# Patient Record
Sex: MALE | Race: WHITE | Employment: OTHER | URBAN - METROPOLITAN AREA
[De-identification: names, ages, dates, MRNs, and addresses within clinical notes are randomized per-mention and may not be internally consistent; named-entity substitution may affect disease eponyms.]

---

## 2017-12-05 ENCOUNTER — APPOINTMENT (OUTPATIENT)
Dept: GENERAL RADIOLOGY | Age: 82
End: 2017-12-05
Attending: EMERGENCY MEDICINE
Payer: MEDICARE

## 2017-12-05 ENCOUNTER — HOSPITAL ENCOUNTER (EMERGENCY)
Age: 82
Discharge: HOME | End: 2017-12-05
Attending: EMERGENCY MEDICINE
Payer: MEDICARE

## 2017-12-05 VITALS
HEART RATE: 64 BPM | TEMPERATURE: 98.4 F | SYSTOLIC BLOOD PRESSURE: 151 MMHG | OXYGEN SATURATION: 98 % | DIASTOLIC BLOOD PRESSURE: 67 MMHG | RESPIRATION RATE: 15 BRPM

## 2017-12-05 DIAGNOSIS — R07.9 CHEST PAIN, UNSPECIFIED TYPE: Primary | ICD-10-CM

## 2017-12-05 LAB
ALBUMIN SERPL-MCNC: 3.7 G/DL (ref 3.4–5)
ALBUMIN/GLOB SERPL: 1.3 {RATIO} (ref 0.8–1.7)
ALP SERPL-CCNC: 89 U/L (ref 45–117)
ALT SERPL-CCNC: 35 U/L (ref 16–61)
ANION GAP SERPL CALC-SCNC: 5 MMOL/L (ref 3–18)
AST SERPL-CCNC: 23 U/L (ref 15–37)
BASOPHILS # BLD: 0 K/UL (ref 0–0.06)
BASOPHILS NFR BLD: 0 % (ref 0–2)
BILIRUB SERPL-MCNC: 0.9 MG/DL (ref 0.2–1)
BUN SERPL-MCNC: 15 MG/DL (ref 7–18)
BUN/CREAT SERPL: 9 (ref 12–20)
CALCIUM SERPL-MCNC: 9.1 MG/DL (ref 8.5–10.1)
CHLORIDE SERPL-SCNC: 106 MMOL/L (ref 100–108)
CK MB CFR SERPL CALC: 2.6 % (ref 0–4)
CK MB SERPL-MCNC: 1.7 NG/ML (ref 5–25)
CK SERPL-CCNC: 65 U/L (ref 39–308)
CO2 SERPL-SCNC: 30 MMOL/L (ref 21–32)
CREAT SERPL-MCNC: 1.6 MG/DL (ref 0.6–1.3)
DIFFERENTIAL METHOD BLD: ABNORMAL
EOSINOPHIL # BLD: 0.1 K/UL (ref 0–0.4)
EOSINOPHIL NFR BLD: 2 % (ref 0–5)
ERYTHROCYTE [DISTWIDTH] IN BLOOD BY AUTOMATED COUNT: 13.1 % (ref 11.6–14.5)
GLOBULIN SER CALC-MCNC: 2.9 G/DL (ref 2–4)
GLUCOSE SERPL-MCNC: 114 MG/DL (ref 74–99)
HCT VFR BLD AUTO: 42.5 % (ref 36–48)
HGB BLD-MCNC: 14.9 G/DL (ref 13–16)
LYMPHOCYTES # BLD: 0.8 K/UL (ref 0.9–3.6)
LYMPHOCYTES NFR BLD: 15 % (ref 21–52)
MCH RBC QN AUTO: 31.8 PG (ref 24–34)
MCHC RBC AUTO-ENTMCNC: 35.1 G/DL (ref 31–37)
MCV RBC AUTO: 90.8 FL (ref 74–97)
MONOCYTES # BLD: 0.5 K/UL (ref 0.05–1.2)
MONOCYTES NFR BLD: 10 % (ref 3–10)
NEUTS SEG # BLD: 4 K/UL (ref 1.8–8)
NEUTS SEG NFR BLD: 73 % (ref 40–73)
PLATELET # BLD AUTO: 192 K/UL (ref 135–420)
PMV BLD AUTO: 10.6 FL (ref 9.2–11.8)
POTASSIUM SERPL-SCNC: 3.4 MMOL/L (ref 3.5–5.5)
PROT SERPL-MCNC: 6.6 G/DL (ref 6.4–8.2)
RBC # BLD AUTO: 4.68 M/UL (ref 4.7–5.5)
SODIUM SERPL-SCNC: 141 MMOL/L (ref 136–145)
TROPONIN I SERPL-MCNC: <0.02 NG/ML (ref 0–0.04)
TROPONIN I SERPL-MCNC: <0.02 NG/ML (ref 0–0.04)
WBC # BLD AUTO: 5.4 K/UL (ref 4.6–13.2)

## 2017-12-05 PROCEDURE — 82550 ASSAY OF CK (CPK): CPT | Performed by: EMERGENCY MEDICINE

## 2017-12-05 PROCEDURE — 80053 COMPREHEN METABOLIC PANEL: CPT | Performed by: EMERGENCY MEDICINE

## 2017-12-05 PROCEDURE — 85025 COMPLETE CBC W/AUTO DIFF WBC: CPT | Performed by: EMERGENCY MEDICINE

## 2017-12-05 PROCEDURE — 71020 XR CHEST PA LAT: CPT

## 2017-12-05 PROCEDURE — 99284 EMERGENCY DEPT VISIT MOD MDM: CPT

## 2017-12-05 PROCEDURE — 93005 ELECTROCARDIOGRAM TRACING: CPT

## 2017-12-05 NOTE — ED NOTES
I performed a brief evaluation, including history and physical, of the patient here in triage and I have determined that pt will need further treatment and evaluation from the main side ER physician. I have placed initial orders to help in expediting patients care. December 05, 2017 at 3:26 PM - Krishna Julien DO        There were no vitals taken for this visit.

## 2017-12-05 NOTE — DISCHARGE INSTRUCTIONS
Chest Pain: Care Instructions  Your Care Instructions    There are many things that can cause chest pain. Some are not serious and will get better on their own in a few days. But some kinds of chest pain need more testing and treatment. Your doctor may have recommended a follow-up visit in the next 8 to 12 hours. If you are not getting better, you may need more tests or treatment. Even though your doctor has released you, you still need to watch for any problems. The doctor carefully checked you, but sometimes problems can develop later. If you have new symptoms or if your symptoms do not get better, get medical care right away. If you have worse or different chest pain or pressure that lasts more than 5 minutes or you passed out (lost consciousness), call 911 or seek other emergency help right away. A medical visit is only one step in your treatment. Even if you feel better, you still need to do what your doctor recommends, such as going to all suggested follow-up appointments and taking medicines exactly as directed. This will help you recover and help prevent future problems. How can you care for yourself at home? · Rest until you feel better. · Take your medicine exactly as prescribed. Call your doctor if you think you are having a problem with your medicine. · Do not drive after taking a prescription pain medicine. When should you call for help? Call 911 if:  ? · You passed out (lost consciousness). ? · You have severe difficulty breathing. ? · You have symptoms of a heart attack. These may include:  ¨ Chest pain or pressure, or a strange feeling in your chest.  ¨ Sweating. ¨ Shortness of breath. ¨ Nausea or vomiting. ¨ Pain, pressure, or a strange feeling in your back, neck, jaw, or upper belly or in one or both shoulders or arms. ¨ Lightheadedness or sudden weakness. ¨ A fast or irregular heartbeat.   After you call 911, the  may tell you to chew 1 adult-strength or 2 to 4 low-dose aspirin. Wait for an ambulance. Do not try to drive yourself. ?Call your doctor today if:  ? · You have any trouble breathing. ? · Your chest pain gets worse. ? · You are dizzy or lightheaded, or you feel like you may faint. ? · You are not getting better as expected. ? · You are having new or different chest pain. Where can you learn more? Go to http://oj-radha.info/. Enter A120 in the search box to learn more about \"Chest Pain: Care Instructions. \"  Current as of: March 20, 2017  Content Version: 11.4  © 2392-4507 [employer]. Care instructions adapted under license by [employer] (which disclaims liability or warranty for this information). If you have questions about a medical condition or this instruction, always ask your healthcare professional. [address] any warranty or liability for your use of this information.

## 2017-12-05 NOTE — ED PROVIDER NOTES
EMERGENCY DEPARTMENT HISTORY AND PHYSICAL EXAM    5:46 PM      Date: 12/5/2017  Patient Name: Treasure Kayser    History of Presenting Illness     Chief Complaint   Patient presents with    Chest Pain         History Provided By: Patient    Chief Complaint: Chest Pain  Duration:  2PM Today  Timing:  Improving  Location: Medial Chest   Quality: Tightness  Severity: Patient has no pain  Modifying Factors: Patient does not know what alleviated the symptoms. Associated Symptoms: Patient denies fever, cough, and congestion. Additional History (Context): Treasure Kayser is a 80 y.o. male with hypertension who presents with medial Chest Tightness onset 2pm today. Notes that he was only watching TV earlier today when the tightness began. States that it did no radiate to other areas. Patient states that the tightness has diminshed since being in the ED. Patient does not know what alleviated the symptoms. Patient denies fever, cough, and congestion. Patient's last physical was October 31st, which showed no abnormalities. Is unsure of his last stress test. Patient denies hx of MI. Had a PSHx of Knee Arthroplasty in 2013. Patient drinks beer 3x per week, and denies smoking. Candance Huger PCP: Deanna Frost MD    Current Outpatient Prescriptions   Medication Sig Dispense Refill    OTHER,NON-FORMULARY, Please dispense a 5 ml solution of Trimix consisting of PGE 20 mcg/Papaverine 30 mg/Phentolamine 2 mg per ml. Patient is to inject 0.4-0.5 ml. 5 Each 5    Syringe with Needle,Disp, Tray (ALLERGIST TRAY 1CC 27GX1/2\") 1 mL 27 x 1/2\" tray Please dispense 25 each/one tray BD brand allergy syringes. To be used as directed. 25 Each 2    sildenafil citrate (VIAGRA) 100 mg tablet Take 100 mg by mouth as needed.  cholecalciferol (VITAMIN D3) 1,000 unit tablet Take 2 Tabs by mouth daily. 60 Tab 0    clonazePAM (KLONOPIN) 0.5 mg tablet Take 1 Tab by mouth two (2) times daily as needed.  60 Tab 0    cloNIDine (CATAPRES) 0.1 mg tablet Take 1 Tab by mouth two (2) times a day. 100 Tab 0    doxepin (SINEQUAN) 25 mg capsule Take 1 Cap by mouth nightly. 60 Cap 0    metoprolol (LOPRESSOR) 100 mg tablet Take 1 Tab by mouth two (2) times a day. 60 Tab 0    terazosin (HYTRIN) 10 mg capsule Take 1 Cap by mouth nightly. 60 Cap 0    amLODIPine (NORVASC) 5 mg tablet Take 1 Tab by mouth daily. 30 Tab 0    ferrous gluconate 324 mg (38 mg iron) tablet Take 1 Tab by mouth daily (with breakfast). 30 Tab 0    oxyCODONE IR (ROXICODONE) 5 mg immediate release tablet Take 1 Tab by mouth every four (4) hours as needed. 100 Tab 0    zolpidem (AMBIEN) 5 mg tablet Take 1 Tab by mouth nightly as needed for Sleep. 30 Tab 0    Omeprazole delayed release (PRILOSEC D/R) 20 mg tablet Take 20 mg by mouth daily.          Past History     Past Medical History:  Past Medical History:   Diagnosis Date    Alcohol dependence, episodic drinking behavior ([facility] [address]     Other and Unspecified    Anxiety state     Arthritis     BPH with obstruction/lower urinary tract symptoms     Carpal tunnel syndrome     Chronic kidney disease, stage III (moderate)     Dyslipidemia     Elevated PSA 2011    Esophageal reflux     Essential hypertension, benign     Exercise tolerance finding July 2015    GERD (gastroesophageal reflux disease)     Gouty arthropathy     History of blood transfusion     Hypopotassemia     Hyposmolality and/or hyponatremia     Irritable bowel syndrome     Knee pain, left     Lumbago     Lumbar back pain     S/P TKR (total knee replacement) 2013    Scoliosis deformity of spine     Sleep apnea        Past Surgical History:  Past Surgical History:   Procedure Laterality Date    HX CHOLECYSTECTOMY      HX HERNIA REPAIR      HX OTHER SURGICAL  07.31.2015    HX ADJACENT TISSUE TRANSFER/REARRANGEMENT       Family History:  Family History   Problem Relation Age of Onset    Hypertension Father        Social History:  Social History   Substance Use Topics    Smoking status: Former Smoker    Smokeless tobacco: Never Used    Alcohol use 1.8 oz/week     3 Cans of beer per week       Allergies:  No Known Allergies      Review of Systems     Review of Systems   Constitutional: Negative for chills and fever. HENT: Negative for congestion and sore throat. Respiratory: Negative for cough and shortness of breath. Cardiovascular: Positive for chest pain (feels like a tightness). Gastrointestinal: Negative for abdominal pain, diarrhea, nausea and vomiting. Genitourinary: Negative for dysuria. Musculoskeletal: Negative for back pain. Skin: Negative for rash. Neurological: Negative for dizziness and headaches. All other systems reviewed and are negative. Physical Exam     Visit Vitals    /63    Pulse (!) 56    Temp 98.4 °F (36.9 °C)    Resp 15    SpO2 98%     Physical Exam  General Exam: Patient is a well developed and well nourished in no distress. Patient does not appear acutely ill or toxic. Eye Exam: Lids and conjunctiva are normal  ENT Exam: The general head and facial exam is normal.  The neck is supple without meningeal signs. No significant adenopathy. Pulmonary Exam: No respiratory distress. The respiratory rate is normal.  No stridor. The breath sounds are equal bilaterally. There are no wheezes, rales, or rhonchi noted. Cardiac Exam: The cardiac rate and rhythm are normal.  No significant murmurs, rubs, or gallops. The peripheral pulses are normal.  Abdominal Exam: Abdomen is soft and non-distended. No pulsatile masses. There is no local tenderness. There is no rebound or guarding noted. Skin and Soft Tissue: The skin is warm and dry, without significant abnormality. Good color. Musculoskeletal Exam: No peripheral edema. The musculoskeletal exam of the lower extremities is normal without significant local tenderness. Psychiatric: Normal adult with appropriate demeanor and interpersonal interaction.   Is oriented to person, place, and time. Diagnostic Study Results     Labs -  Recent Results (from the past 12 hour(s))   EKG, 12 LEAD, INITIAL    Collection Time: 12/05/17  3:30 PM   Result Value Ref Range    Ventricular Rate 57 BPM    Atrial Rate 57 BPM    P-R Interval 184 ms    QRS Duration 96 ms    Q-T Interval 412 ms    QTC Calculation (Bezet) 401 ms    Calculated P Axis 52 degrees    Calculated R Axis 38 degrees    Calculated T Axis 37 degrees    Diagnosis       Sinus bradycardia  Otherwise normal ECG  No previous ECGs available     CBC WITH AUTOMATED DIFF    Collection Time: 12/05/17  3:57 PM   Result Value Ref Range    WBC 5.4 4.6 - 13.2 K/uL    RBC 4.68 (L) 4.70 - 5.50 M/uL    HGB 14.9 13.0 - 16.0 g/dL    HCT 42.5 36.0 - 48.0 %    MCV 90.8 74.0 - 97.0 FL    MCH 31.8 24.0 - 34.0 PG    MCHC 35.1 31.0 - 37.0 g/dL    RDW 13.1 11.6 - 14.5 %    PLATELET 137 176 - 896 K/uL    MPV 10.6 9.2 - 11.8 FL    NEUTROPHILS 73 40 - 73 %    LYMPHOCYTES 15 (L) 21 - 52 %    MONOCYTES 10 3 - 10 %    EOSINOPHILS 2 0 - 5 %    BASOPHILS 0 0 - 2 %    ABS. NEUTROPHILS 4.0 1.8 - 8.0 K/UL    ABS. LYMPHOCYTES 0.8 (L) 0.9 - 3.6 K/UL    ABS. MONOCYTES 0.5 0.05 - 1.2 K/UL    ABS. EOSINOPHILS 0.1 0.0 - 0.4 K/UL    ABS. BASOPHILS 0.0 0.0 - 0.06 K/UL    DF AUTOMATED     METABOLIC PANEL, COMPREHENSIVE    Collection Time: 12/05/17  3:57 PM   Result Value Ref Range    Sodium 141 136 - 145 mmol/L    Potassium 3.4 (L) 3.5 - 5.5 mmol/L    Chloride 106 100 - 108 mmol/L    CO2 30 21 - 32 mmol/L    Anion gap 5 3.0 - 18 mmol/L    Glucose 114 (H) 74 - 99 mg/dL    BUN 15 7.0 - 18 MG/DL    Creatinine 1.60 (H) 0.6 - 1.3 MG/DL    BUN/Creatinine ratio 9 (L) 12 - 20      GFR est AA 50 (L) >60 ml/min/1.73m2    GFR est non-AA 41 (L) >60 ml/min/1.73m2    Calcium 9.1 8.5 - 10.1 MG/DL    Bilirubin, total 0.9 0.2 - 1.0 MG/DL    ALT (SGPT) 35 16 - 61 U/L    AST (SGOT) 23 15 - 37 U/L    Alk.  phosphatase 89 45 - 117 U/L    Protein, total 6.6 6.4 - 8.2 g/dL    Albumin 3.7 3.4 - 5.0 g/dL    Globulin 2.9 2.0 - 4.0 g/dL    A-G Ratio 1.3 0.8 - 1.7     CARDIAC PANEL,(CK, CKMB & TROPONIN)    Collection Time: 12/05/17  3:57 PM   Result Value Ref Range    CK 65 39 - 308 U/L    CK - MB 1.7 <3.6 ng/ml    CK-MB Index 2.6 0.0 - 4.0 %    Troponin-I, Qt. <0.02 0.0 - 0.045 NG/ML   TROPONIN I    Collection Time: 12/05/17  6:00 PM   Result Value Ref Range    Troponin-I, Qt. <0.02 0.0 - 0.045 NG/ML       Radiologic Studies -     XR CHEST PA LAT:  No acute cardiopulmonary disease. Medical Decision Making   I am the first provider for this patient. I reviewed the vital signs, available nursing notes, past medical history, past surgical history, family history and social history. Vital Signs-Reviewed the patient's vital signs. EKG: Interpreted by the EP. Time Interpreted: 15:30PM   Rate:  57bpm   Rhythm: Sinus Bradycardia    Interpretation: No STEMI   Comparison: No previous EKGs available     Records Reviewed: Nursing Notes and Triage notes  (Time of Review: 5:46 PM)    ED Course: Progress Notes, Reevaluation, and Consults:  2507: Pt denies any pain, eager to go home. Aware that repeat troponin is pending. 1827: 2nd troponin negative with no CP during my care. Aware of need for follow up with PCP and advised on return precautions. Diagnosis     Clinical Impression:   1. Chest pain, unspecified type        Disposition: Discharged     Follow-up Information     Follow up With Details Comments [address] MD In 3 days  [address] [place] [address] EMERGENCY DEPT  If symptoms worsen [address] [facility].  [phone]           Patient's Medications   Start Taking    No medications on file   Continue Taking    AMLODIPINE (NORVASC) 5 MG TABLET    Take 1 Tab by mouth daily. CHOLECALCIFEROL (VITAMIN D3) 1,000 UNIT TABLET    Take 2 Tabs by mouth daily.     CLONAZEPAM (KLONOPIN) 0.5 MG TABLET Take 1 Tab by mouth two (2) times daily as needed. CLONIDINE (CATAPRES) 0.1 MG TABLET    Take 1 Tab by mouth two (2) times a day. DOXEPIN (SINEQUAN) 25 MG CAPSULE    Take 1 Cap by mouth nightly. FERROUS GLUCONATE 324 MG (38 MG IRON) TABLET    Take 1 Tab by mouth daily (with breakfast). METOPROLOL (LOPRESSOR) 100 MG TABLET    Take 1 Tab by mouth two (2) times a day. OMEPRAZOLE DELAYED RELEASE (PRILOSEC D/R) 20 MG TABLET    Take 20 mg by mouth daily. OTHER,NON-FORMULARY,    Please dispense a 5 ml solution of Trimix consisting of PGE 20 mcg/Papaverine 30 mg/Phentolamine 2 mg per ml. Patient is to inject 0.4-0.5 ml. OXYCODONE IR (ROXICODONE) 5 MG IMMEDIATE RELEASE TABLET    Take 1 Tab by mouth every four (4) hours as needed. SILDENAFIL CITRATE (VIAGRA) 100 MG TABLET    Take 100 mg by mouth as needed. SYRINGE WITH NEEDLE,DISP, TRAY (ALLERGIST TRAY 1CC 27GX1/2\") 1 ML 27 X 1/2\" TRAY    Please dispense 25 each/one tray BD brand allergy syringes. To be used as directed. TERAZOSIN (HYTRIN) 10 MG CAPSULE    Take 1 Cap by mouth nightly. ZOLPIDEM (AMBIEN) 5 MG TABLET    Take 1 Tab by mouth nightly as needed for Sleep. These Medications have changed    No medications on file   Stop Taking    No medications on file     _______________________________    Attestations:  Adrian Payan [id number] acting as a scribe for and in the presence of Fabricio Ponce MD      December 05, 2017 at 5:46 PM       Provider Attestation:      I personally performed the services described in the documentation, reviewed the documentation, as recorded by the scribe in my presence, and it accurately and completely records my words and actions.  December 05, 2017 at 5:46 PM - Fabricio Ponce MD    _______________________________

## 2017-12-06 LAB
ATRIAL RATE: 57 BPM
CALCULATED P AXIS, ECG09: 52 DEGREES
CALCULATED R AXIS, ECG10: 38 DEGREES
CALCULATED T AXIS, ECG11: 37 DEGREES
DIAGNOSIS, 93000: NORMAL
P-R INTERVAL, ECG05: 184 MS
Q-T INTERVAL, ECG07: 412 MS
QRS DURATION, ECG06: 96 MS
QTC CALCULATION (BEZET), ECG08: 401 MS
VENTRICULAR RATE, ECG03: 57 BPM

## 2018-10-22 ENCOUNTER — APPOINTMENT (OUTPATIENT)
Dept: CT IMAGING | Age: 83
DRG: 066 | End: 2018-10-22
Attending: EMERGENCY MEDICINE
Payer: MEDICARE

## 2018-10-22 ENCOUNTER — HOSPITAL ENCOUNTER (INPATIENT)
Age: 83
LOS: 3 days | Discharge: HOME | DRG: 066 | End: 2018-10-25
Attending: EMERGENCY MEDICINE | Admitting: FAMILY MEDICINE
Payer: MEDICARE

## 2018-10-22 ENCOUNTER — APPOINTMENT (OUTPATIENT)
Dept: MRI IMAGING | Age: 83
DRG: 066 | End: 2018-10-22
Attending: EMERGENCY MEDICINE
Payer: MEDICARE

## 2018-10-22 ENCOUNTER — APPOINTMENT (OUTPATIENT)
Dept: GENERAL RADIOLOGY | Age: 83
DRG: 066 | End: 2018-10-22
Attending: EMERGENCY MEDICINE
Payer: MEDICARE

## 2018-10-22 DIAGNOSIS — G45.9 TIA (TRANSIENT ISCHEMIC ATTACK): Primary | ICD-10-CM

## 2018-10-22 PROBLEM — I63.9 STROKE (CEREBRUM) (HCC): Status: ACTIVE | Noted: 2018-10-22

## 2018-10-22 LAB
ALBUMIN SERPL-MCNC: 3.6 G/DL (ref 3.4–5)
ALBUMIN/GLOB SERPL: 1.3 {RATIO} (ref 0.8–1.7)
ALP SERPL-CCNC: 85 U/L (ref 45–117)
ALT SERPL-CCNC: 30 U/L (ref 16–61)
AMPHET UR QL SCN: NEGATIVE
ANION GAP SERPL CALC-SCNC: 6 MMOL/L (ref 3–18)
APPEARANCE UR: CLEAR
ASPIRIN TEST, ASPIRN: 575 ARU (ref 620–672)
AST SERPL-CCNC: 17 U/L (ref 15–37)
ATRIAL RATE: 58 BPM
BARBITURATES UR QL SCN: NEGATIVE
BENZODIAZ UR QL: NEGATIVE
BILIRUB SERPL-MCNC: 0.6 MG/DL (ref 0.2–1)
BILIRUB UR QL: NEGATIVE
BUN SERPL-MCNC: 14 MG/DL (ref 7–18)
BUN/CREAT SERPL: 10 (ref 12–20)
CALCIUM SERPL-MCNC: 8.7 MG/DL (ref 8.5–10.1)
CALCULATED P AXIS, ECG09: -4 DEGREES
CALCULATED R AXIS, ECG10: -3 DEGREES
CALCULATED T AXIS, ECG11: 41 DEGREES
CANNABINOIDS UR QL SCN: NEGATIVE
CHLORIDE SERPL-SCNC: 107 MMOL/L (ref 100–108)
CHOLEST SERPL-MCNC: 122 MG/DL
CHOLEST SERPL-MCNC: 133 MG/DL
CO2 SERPL-SCNC: 30 MMOL/L (ref 21–32)
COCAINE UR QL SCN: NEGATIVE
COLOR UR: YELLOW
CREAT SERPL-MCNC: 1.41 MG/DL (ref 0.6–1.3)
DIAGNOSIS, 93000: NORMAL
ERYTHROCYTE [DISTWIDTH] IN BLOOD BY AUTOMATED COUNT: 13.5 % (ref 11.6–14.5)
ERYTHROCYTE [SEDIMENTATION RATE] IN BLOOD: 5 MM/HR (ref 0–20)
FIBRINOGEN PPP-MCNC: 332 MG/DL (ref 210–451)
GLOBULIN SER CALC-MCNC: 2.7 G/DL (ref 2–4)
GLUCOSE BLD STRIP.AUTO-MCNC: 122 MG/DL (ref 70–110)
GLUCOSE BLD STRIP.AUTO-MCNC: 122 MG/DL (ref 70–110)
GLUCOSE BLD STRIP.AUTO-MCNC: 140 MG/DL (ref 70–110)
GLUCOSE SERPL-MCNC: 125 MG/DL (ref 74–99)
GLUCOSE UR STRIP.AUTO-MCNC: NEGATIVE MG/DL
HBA1C MFR BLD: 5.6 % (ref 4.2–5.6)
HCT VFR BLD AUTO: 40.8 % (ref 36–48)
HDLC SERPL-MCNC: 41 MG/DL (ref 40–60)
HDLC SERPL-MCNC: 44 MG/DL (ref 40–60)
HDLC SERPL: 3 {RATIO} (ref 0–5)
HDLC SERPL: 3 {RATIO} (ref 0–5)
HDSCOM,HDSCOM: NORMAL
HGB BLD-MCNC: 14.1 G/DL (ref 13–16)
HGB UR QL STRIP: NEGATIVE
INR PPP: 1 (ref 0.8–1.2)
KETONES UR QL STRIP.AUTO: NEGATIVE MG/DL
LDLC SERPL CALC-MCNC: 47.4 MG/DL (ref 0–100)
LDLC SERPL CALC-MCNC: 52 MG/DL (ref 0–100)
LEUKOCYTE ESTERASE UR QL STRIP.AUTO: NEGATIVE
LIPID PROFILE,FLP: ABNORMAL
LIPID PROFILE,FLP: ABNORMAL
MCH RBC QN AUTO: 32 PG (ref 24–34)
MCHC RBC AUTO-ENTMCNC: 34.6 G/DL (ref 31–37)
MCV RBC AUTO: 92.5 FL (ref 74–97)
METHADONE UR QL: NEGATIVE
NITRITE UR QL STRIP.AUTO: NEGATIVE
OPIATES UR QL: NEGATIVE
P-R INTERVAL, ECG05: 164 MS
PCP UR QL: NEGATIVE
PH UR STRIP: 6.5 [PH] (ref 5–8)
PLATELET # BLD AUTO: 185 K/UL (ref 135–420)
PMV BLD AUTO: 10 FL (ref 9.2–11.8)
POTASSIUM SERPL-SCNC: 3.6 MMOL/L (ref 3.5–5.5)
PROT SERPL-MCNC: 6.3 G/DL (ref 6.4–8.2)
PROT UR STRIP-MCNC: NEGATIVE MG/DL
PROTHROMBIN TIME: 13 SEC (ref 11.5–15.2)
Q-T INTERVAL, ECG07: 414 MS
QRS DURATION, ECG06: 102 MS
QTC CALCULATION (BEZET), ECG08: 406 MS
RBC # BLD AUTO: 4.41 M/UL (ref 4.7–5.5)
SODIUM SERPL-SCNC: 143 MMOL/L (ref 136–145)
SP GR UR REFRACTOMETRY: 1.01 (ref 1–1.03)
T3FREE SERPL-MCNC: 2.4 PG/ML (ref 2.18–3.98)
T4 FREE SERPL-MCNC: 1 NG/DL (ref 0.7–1.5)
THROMBIN TIME: 16.5 SECS (ref 13.8–18.2)
TRIGL SERPL-MCNC: 168 MG/DL (ref ?–150)
TRIGL SERPL-MCNC: 185 MG/DL (ref ?–150)
TROPONIN I SERPL-MCNC: 0.02 NG/ML (ref 0–0.04)
TSH SERPL DL<=0.05 MIU/L-ACNC: 1.4 UIU/ML (ref 0.36–3.74)
UROBILINOGEN UR QL STRIP.AUTO: 0.2 EU/DL (ref 0.2–1)
VENTRICULAR RATE, ECG03: 58 BPM
VLDLC SERPL CALC-MCNC: 33.6 MG/DL
VLDLC SERPL CALC-MCNC: 37 MG/DL
WBC # BLD AUTO: 5.4 K/UL (ref 4.6–13.2)

## 2018-10-22 PROCEDURE — 85027 COMPLETE CBC AUTOMATED: CPT | Performed by: EMERGENCY MEDICINE

## 2018-10-22 PROCEDURE — 84484 ASSAY OF TROPONIN QUANT: CPT | Performed by: EMERGENCY MEDICINE

## 2018-10-22 PROCEDURE — 86870 RBC ANTIBODY IDENTIFICATION: CPT | Performed by: EMERGENCY MEDICINE

## 2018-10-22 PROCEDURE — 83036 HEMOGLOBIN GLYCOSYLATED A1C: CPT | Performed by: EMERGENCY MEDICINE

## 2018-10-22 PROCEDURE — 70450 CT HEAD/BRAIN W/O DYE: CPT

## 2018-10-22 PROCEDURE — 74011250637 HC RX REV CODE- 250/637: Performed by: NURSE PRACTITIONER

## 2018-10-22 PROCEDURE — 84481 FREE ASSAY (FT-3): CPT | Performed by: NURSE PRACTITIONER

## 2018-10-22 PROCEDURE — 85576 BLOOD PLATELET AGGREGATION: CPT | Performed by: EMERGENCY MEDICINE

## 2018-10-22 PROCEDURE — 99285 EMERGENCY DEPT VISIT HI MDM: CPT

## 2018-10-22 PROCEDURE — 71045 X-RAY EXAM CHEST 1 VIEW: CPT

## 2018-10-22 PROCEDURE — 93005 ELECTROCARDIOGRAM TRACING: CPT

## 2018-10-22 PROCEDURE — 85652 RBC SED RATE AUTOMATED: CPT | Performed by: NURSE PRACTITIONER

## 2018-10-22 PROCEDURE — 74011250637 HC RX REV CODE- 250/637: Performed by: EMERGENCY MEDICINE

## 2018-10-22 PROCEDURE — 85610 PROTHROMBIN TIME: CPT | Performed by: EMERGENCY MEDICINE

## 2018-10-22 PROCEDURE — 84439 ASSAY OF FREE THYROXINE: CPT | Performed by: NURSE PRACTITIONER

## 2018-10-22 PROCEDURE — 80061 LIPID PANEL: CPT | Performed by: NURSE PRACTITIONER

## 2018-10-22 PROCEDURE — 84443 ASSAY THYROID STIM HORMONE: CPT | Performed by: NURSE PRACTITIONER

## 2018-10-22 PROCEDURE — 80307 DRUG TEST PRSMV CHEM ANLYZR: CPT | Performed by: EMERGENCY MEDICINE

## 2018-10-22 PROCEDURE — 81003 URINALYSIS AUTO W/O SCOPE: CPT | Performed by: EMERGENCY MEDICINE

## 2018-10-22 PROCEDURE — 70551 MRI BRAIN STEM W/O DYE: CPT

## 2018-10-22 PROCEDURE — 85670 THROMBIN TIME PLASMA: CPT | Performed by: EMERGENCY MEDICINE

## 2018-10-22 PROCEDURE — 86922 COMPATIBILITY TEST ANTIGLOB: CPT | Performed by: EMERGENCY MEDICINE

## 2018-10-22 PROCEDURE — 74011250636 HC RX REV CODE- 250/636: Performed by: NURSE PRACTITIONER

## 2018-10-22 PROCEDURE — 80053 COMPREHEN METABOLIC PANEL: CPT | Performed by: EMERGENCY MEDICINE

## 2018-10-22 PROCEDURE — 85384 FIBRINOGEN ACTIVITY: CPT | Performed by: EMERGENCY MEDICINE

## 2018-10-22 PROCEDURE — 86141 C-REACTIVE PROTEIN HS: CPT | Performed by: NURSE PRACTITIONER

## 2018-10-22 PROCEDURE — 77030021352 HC CBL LD SYS DISP COVD -B

## 2018-10-22 PROCEDURE — 80061 LIPID PANEL: CPT | Performed by: EMERGENCY MEDICINE

## 2018-10-22 PROCEDURE — 86900 BLOOD TYPING SEROLOGIC ABO: CPT | Performed by: EMERGENCY MEDICINE

## 2018-10-22 PROCEDURE — 86902 BLOOD TYPE ANTIGEN DONOR EA: CPT | Performed by: EMERGENCY MEDICINE

## 2018-10-22 PROCEDURE — 82962 GLUCOSE BLOOD TEST: CPT

## 2018-10-22 PROCEDURE — 65660000000 HC RM CCU STEPDOWN

## 2018-10-22 PROCEDURE — 86921 COMPATIBILITY TEST INCUBATE: CPT | Performed by: EMERGENCY MEDICINE

## 2018-10-22 RX ORDER — ASPIRIN 325 MG
325 TABLET ORAL
Status: DISCONTINUED | OUTPATIENT
Start: 2018-10-22 | End: 2018-10-22

## 2018-10-22 RX ORDER — ATORVASTATIN CALCIUM 40 MG/1
40 TABLET, FILM COATED ORAL
Status: DISCONTINUED | OUTPATIENT
Start: 2018-10-22 | End: 2018-10-25 | Stop reason: HOSPADM

## 2018-10-22 RX ORDER — ASPIRIN 325 MG
325 TABLET ORAL DAILY
Status: DISCONTINUED | OUTPATIENT
Start: 2018-10-23 | End: 2018-10-25 | Stop reason: HOSPADM

## 2018-10-22 RX ORDER — ATORVASTATIN CALCIUM 40 MG/1
80 TABLET, FILM COATED ORAL
Status: DISCONTINUED | OUTPATIENT
Start: 2018-10-22 | End: 2018-10-24

## 2018-10-22 RX ORDER — HEPARIN SODIUM 5000 [USP'U]/ML
5000 INJECTION, SOLUTION INTRAVENOUS; SUBCUTANEOUS EVERY 8 HOURS
Status: DISCONTINUED | OUTPATIENT
Start: 2018-10-22 | End: 2018-10-25 | Stop reason: HOSPADM

## 2018-10-22 RX ORDER — ACETAMINOPHEN 650 MG/1
650 SUPPOSITORY RECTAL
Status: DISCONTINUED | OUTPATIENT
Start: 2018-10-22 | End: 2018-10-25 | Stop reason: HOSPADM

## 2018-10-22 RX ORDER — ACETAMINOPHEN 325 MG/1
650 TABLET ORAL
Status: DISCONTINUED | OUTPATIENT
Start: 2018-10-22 | End: 2018-10-25 | Stop reason: HOSPADM

## 2018-10-22 RX ORDER — ASPIRIN 300 MG/1
300 SUPPOSITORY RECTAL
Status: COMPLETED | OUTPATIENT
Start: 2018-10-22 | End: 2018-10-22

## 2018-10-22 RX ORDER — ONDANSETRON 2 MG/ML
2 INJECTION INTRAMUSCULAR; INTRAVENOUS
Status: DISCONTINUED | OUTPATIENT
Start: 2018-10-22 | End: 2018-10-25 | Stop reason: HOSPADM

## 2018-10-22 RX ORDER — AMOXICILLIN 250 MG
2 CAPSULE ORAL
Status: DISCONTINUED | OUTPATIENT
Start: 2018-10-22 | End: 2018-10-25 | Stop reason: HOSPADM

## 2018-10-22 RX ORDER — ALBUTEROL SULFATE 0.83 MG/ML
2.5 SOLUTION RESPIRATORY (INHALATION)
Status: DISCONTINUED | OUTPATIENT
Start: 2018-10-22 | End: 2018-10-25 | Stop reason: HOSPADM

## 2018-10-22 RX ADMIN — HEPARIN SODIUM 5000 UNITS: 5000 INJECTION INTRAVENOUS; SUBCUTANEOUS at 18:50

## 2018-10-22 RX ADMIN — ASPIRIN 300 MG: 300 SUPPOSITORY RECTAL at 14:39

## 2018-10-22 RX ADMIN — ATORVASTATIN CALCIUM 80 MG: 40 TABLET, FILM COATED ORAL at 18:49

## 2018-10-22 RX ADMIN — SENNOSIDES-DOCUSATE SODIUM TAB 8.6-50 MG 2 TABLET: 8.6-5 TAB at 22:42

## 2018-10-22 RX ADMIN — ATORVASTATIN CALCIUM 40 MG: 40 TABLET, FILM COATED ORAL at 22:30

## 2018-10-22 NOTE — ED TRIAGE NOTES
Pt arrived by EMS complaining of slurred speech that started about 20 mins prior to arrival. Alert and oriented. MD at the [facility] for evaluation.

## 2018-10-22 NOTE — H&P
GENERAL GENERIC H&P/CONSULT    Kady Tracy is a 80 y.o. male with hypertension and CKD who presents with after he noticed mild slurred speech that occurred 20 minutes PTA along with right sided facial droop. Noted some numbness to left side of face. Patient notes improvement however concern for TIA. Patient notes his symptoms are improving. No sob, cp, fever ,chills, n/v, or any other acute complaitnsa t this time. Code S was called and admit for TIA. Past Medical History:   Diagnosis Date    Alcohol dependence, episodic drinking behavior ([facility] [address]     Other and Unspecified    Anxiety state     Arthritis     BPH with obstruction/lower urinary tract symptoms     Carpal tunnel syndrome     Chronic kidney disease, stage III (moderate) (HCC)     Dyslipidemia     Elevated PSA 2011    Esophageal reflux     Essential hypertension, benign     Exercise tolerance finding July 2015    GERD (gastroesophageal reflux disease)     Gouty arthropathy     History of blood transfusion     Hypopotassemia     Hyposmolality and/or hyponatremia     Irritable bowel syndrome     Knee pain, left     Lumbago     Lumbar back pain     S/P TKR (total knee replacement) 2013    Scoliosis deformity of spine     Sleep apnea       Past Surgical History:   Procedure Laterality Date    HX CHOLECYSTECTOMY      HX HERNIA REPAIR      HX OTHER SURGICAL  07.31.2015    HX ADJACENT TISSUE TRANSFER/REARRANGEMENT      Prior to Admission medications    Medication Sig Start Date End Date Taking? Authorizing Provider   cyclobenzaprine (FLEXERIL) 10 mg tablet 10 mg. 7/5/17   Provider, Historical   HYDROcodone-acetaminophen (NORCO) 5-325 mg per tablet Take 1 Tab by Mouth Every 6 Hours As Needed for Pain. 10/31/17   Provider, Historical   MULTIVITAMIN PO Take  by Mouth Once a Day.     Provider, Historical   sucralfate (CARAFATE) 1 gram tablet 1 g. 11/8/13   Provider, Historical   OTHER,NON-FORMULARY, Please dispense a 5 ml solution of Trimix consisting of PGE 20 mcg/Papaverine 30 mg/Phentolamine 2 mg per ml. Patient is to inject 0.4-0.5 ml. 1/26/18   Isabel Ashton MD   Syringe with Needle,Disp, Tray (ALLERGIST TRAY 1CC 27GX1/2\") 1 mL 27 x 1/2\" tray Please dispense 25 each/one tray BD brand allergy syringes. To be used as directed. 1/26/18   Isabel Ashton MD   cholecalciferol (VITAMIN D3) 1,000 unit tablet Take 2 Tabs by mouth daily. 12/9/13   Ethan Barfield MD   clonazePAM (KLONOPIN) 0.5 mg tablet Take 1 Tab by mouth two (2) times daily as needed. 12/9/13   Ethan Barfield MD   cloNIDine (CATAPRES) 0.1 mg tablet Take 1 Tab by mouth two (2) times a day. 12/9/13   Ethan Barfield MD   doxepin (SINEQUAN) 25 mg capsule Take 1 Cap by mouth nightly. 12/9/13   Ethan Barfield MD   metoprolol (LOPRESSOR) 100 mg tablet Take 1 Tab by mouth two (2) times a day. 12/9/13   Ethan Barfield MD   terazosin (HYTRIN) 10 mg capsule Take 1 Cap by mouth nightly. 12/9/13   Ethan Barfield MD   amLODIPine (NORVASC) 5 mg tablet Take 1 Tab by mouth daily. 12/9/13   Ethan Barfield MD   oxyCODONE IR (ROXICODONE) 5 mg immediate release tablet Take 1 Tab by mouth every four (4) hours as needed. 12/9/13   Ethan Barfield MD   zolpidem (AMBIEN) 5 mg tablet Take 1 Tab by mouth nightly as needed for Sleep. 12/9/13   Ethan Barfield MD   Omeprazole delayed release (PRILOSEC D/R) 20 mg tablet Take 20 mg by mouth daily. Provider, Historical     No Known Allergies   Social History     Tobacco Use    Smoking status: Former Smoker    Smokeless tobacco: Never Used   Substance Use Topics    Alcohol use: Yes     Alcohol/week: 1.8 oz     Types: 3 Cans of beer per week      Family History   Problem Relation Age of Onset    Hypertension Father     Diabetes Mother       Review of Systems   Unable to perform ROS: Acuity of condition       Objective:    No intake/output data recorded. No intake/output data recorded.   Patient Vitals for the past 8 hrs:   BP Temp Pulse Resp SpO2 Height Weight   10/22/18 1430 141/59 -- 71 24 100 % -- --   10/22/18 1415 (!) 159/94 -- 73 15 99 % -- --   10/22/18 1400 166/68 -- 69 16 96 % -- --   10/22/18 1355 166/64 -- 60 17 96 % -- --   10/22/18 1350 148/69 97.7 °F (36.5 °C) 67 12 95 % 5' 7\" (1.702 m) 81.6 kg (180 lb)     Physical Exam   Constitutional: No distress. HENT:   Head: Normocephalic. No signfiicant facial droop noted   Eyes: Pupils are equal, round, and reactive to light. Neck: Normal range of motion. Cardiovascular: Normal rate and regular rhythm. Pulmonary/Chest: Effort normal and breath sounds normal.   Abdominal: Soft. Bowel sounds are normal.   Neurological:   Mildly slurred speech   Skin: He is not diaphoretic.         Labs:    Recent Results (from the past 24 hour(s))   GLUCOSE, POC    Collection Time: 10/22/18  1:44 PM   Result Value Ref Range    Glucose (POC) 122 (H) 70 - 110 mg/dL   TYPE & SCREEN    Collection Time: 10/22/18  1:45 PM   Result Value Ref Range    Crossmatch Expiration 10/25/2018     ABO/Rh(D) PENDING     Antibody screen PENDING    CBC W/O DIFF    Collection Time: 10/22/18  1:55 PM   Result Value Ref Range    WBC 5.4 4.6 - 13.2 K/uL    RBC 4.41 (L) 4.70 - 5.50 M/uL    HGB 14.1 13.0 - 16.0 g/dL    HCT 40.8 36.0 - 48.0 %    MCV 92.5 74.0 - 97.0 FL    MCH 32.0 24.0 - 34.0 PG    MCHC 34.6 31.0 - 37.0 g/dL    RDW 13.5 11.6 - 14.5 %    PLATELET 283 308 - 993 K/uL    MPV 10.0 9.2 - 29.5 FL   METABOLIC PANEL, COMPREHENSIVE    Collection Time: 10/22/18  1:55 PM   Result Value Ref Range    Sodium 143 136 - 145 mmol/L    Potassium 3.6 3.5 - 5.5 mmol/L    Chloride 107 100 - 108 mmol/L    CO2 30 21 - 32 mmol/L    Anion gap 6 3.0 - 18 mmol/L    Glucose 125 (H) 74 - 99 mg/dL    BUN 14 7.0 - 18 MG/DL    Creatinine 1.41 (H) 0.6 - 1.3 MG/DL    BUN/Creatinine ratio 10 (L) 12 - 20      GFR est AA 58 (L) >60 ml/min/1.73m2    GFR est non-AA 48 (L) >60 ml/min/1.73m2    Calcium 8.7 8.5 - 10.1 MG/DL Bilirubin, total 0.6 0.2 - 1.0 MG/DL    ALT (SGPT) 30 16 - 61 U/L    AST (SGOT) 17 15 - 37 U/L    Alk.  phosphatase 85 45 - 117 U/L    Protein, total 6.3 (L) 6.4 - 8.2 g/dL    Albumin 3.6 3.4 - 5.0 g/dL    Globulin 2.7 2.0 - 4.0 g/dL    A-G Ratio 1.3 0.8 - 1.7     PROTHROMBIN TIME + INR    Collection Time: 10/22/18  1:55 PM   Result Value Ref Range    Prothrombin time 13.0 11.5 - 15.2 sec    INR 1.0 0.8 - 1.2     THROMBIN TIME    Collection Time: 10/22/18  1:55 PM   Result Value Ref Range    Thrombin time 16.5 13.8 - 18.2 SECS   FIBRINOGEN    Collection Time: 10/22/18  1:55 PM   Result Value Ref Range    Fibrinogen 332 210 - 451 mg/dL   ASPIRIN TEST    Collection Time: 10/22/18  1:55 PM   Result Value Ref Range    Aspirin test 575 (L) 620 - 672 ARU   HEMOGLOBIN A1C W/O EAG    Collection Time: 10/22/18  1:55 PM   Result Value Ref Range    Hemoglobin A1c 5.6 4.2 - 5.6 %   LIPID PANEL    Collection Time: 10/22/18  1:55 PM   Result Value Ref Range    LIPID PROFILE          Cholesterol, total 133 <200 MG/DL    Triglyceride 185 (H) <150 MG/DL    HDL Cholesterol 44 40 - 60 MG/DL    LDL, calculated 52 0 - 100 MG/DL    VLDL, calculated 37 MG/DL    CHOL/HDL Ratio 3.0 0 - 5.0     TROPONIN I    Collection Time: 10/22/18  1:55 PM   Result Value Ref Range    Troponin-I, Qt. 0.02 0.0 - 0.045 NG/ML   EKG, 12 LEAD, INITIAL    Collection Time: 10/22/18  2:21 PM   Result Value Ref Range    Ventricular Rate 58 BPM    Atrial Rate 58 BPM    P-R Interval 164 ms    QRS Duration 102 ms    Q-T Interval 414 ms    QTC Calculation (Bezet) 406 ms    Calculated P Axis -4 degrees    Calculated R Axis -3 degrees    Calculated T Axis 41 degrees    Diagnosis       Sinus bradycardia with occasional premature ventricular complexes  Otherwise normal ECG  When compared with ECG of 05-DEC-2017 15:30,  premature ventricular complexes are now present     URINALYSIS W/ RFLX MICROSCOPIC    Collection Time: 10/22/18  2:45 PM   Result Value Ref Range    Color YELLOW      Appearance CLEAR      Specific gravity 1.014 1.005 - 1.030      pH (UA) 6.5 5.0 - 8.0      Protein NEGATIVE  NEG mg/dL    Glucose NEGATIVE  NEG mg/dL    Ketone NEGATIVE  NEG mg/dL    Bilirubin NEGATIVE  NEG      Blood NEGATIVE  NEG      Urobilinogen 0.2 0.2 - 1.0 EU/dL    Nitrites NEGATIVE  NEG      Leukocyte Esterase NEGATIVE  NEG           Assessment:  Principal Problem:    TIA (transient ischemic attack) (10/22/2018)    Active Problems:    BPH (benign prostatic hyperplasia) (11/18/2013)      Essential hypertension, benign (11/18/2013)        Plan:    TIA  -asa, statin  -permissive HTN  -CT head negaive  -ECHO   -improving  -MRI/MRA  -Neuro  -pt/ot  -neuro checks    Essential HTN  -meds    BPH  -meds    DVT prophyalxis  -scd/teds  -heparin sq  Signed:  Srikanth Cardenas NP 10/22/2018

## 2018-10-22 NOTE — ED NOTES
TRANSFER - ED to INPATIENT REPORT:    SBAR report made available to receiving floor on this patient being transferred to [facility] ([id number])  for routine progression of care       Admitting diagnosis TIA (transient ischemic attack)    Information from the following report(s) SBAR, MAR, Recent Results and Cardiac Rhythm NSR was made available to receiving floor. Lines:   Peripheral IV 10/22/18 Left Antecubital (Active)   Site Assessment Clean, dry, & intact 10/22/2018  1:38 PM   Phlebitis Assessment 0 10/22/2018  1:38 PM   Infiltration Assessment 0 10/22/2018  1:38 PM   Dressing Status Clean, dry, & intact 10/22/2018  1:38 PM   Dressing Type Transparent 10/22/2018  1:38 PM   Hub Color/Line Status Green 10/22/2018  1:38 PM   Action Taken Blood drawn 10/22/2018  1:38 PM        Medication list confirmed with patient    Opportunity for questions and clarification was provided.       Patient is oriented to time, place, person and situation Last NIH 1  Patient is  continent and ambulatory with assist     Valuables transported with patient     Patient transported with:   Monitor  Registered Nurse  Tech

## 2018-10-22 NOTE — ED PROVIDER NOTES
EMERGENCY DEPARTMENT HISTORY AND PHYSICAL EXAM    1:42 PM      Date: 10/22/2018  Patient Name: Edmund Worley    History of Presenting Illness     Chief Complaint   Patient presents with    Dysarthria         History Provided By: Patient and EMS      Additional History (Context): Edmund Worley is a 80 y.o. male with hypertension and CKD who presents with after he noticed mild slurred speech that occurred 20 minutes PTA. Associated sx are right sided facial droop. Denies HA. He is a former smoker and drinks. PCP: Shakeel Torres MD    Current Facility-Administered Medications   Medication Dose Route Frequency Provider Last Rate Last Dose    atorvastatin (LIPITOR) tablet 40 mg  40 mg Oral QHS Shaan Fragoso MD         Current Outpatient Medications   Medication Sig Dispense Refill    cyclobenzaprine (FLEXERIL) 10 mg tablet 10 mg.      HYDROcodone-acetaminophen (NORCO) 5-325 mg per tablet Take 1 Tab by Mouth Every 6 Hours As Needed for Pain.  MULTIVITAMIN PO Take  by Mouth Once a Day.  sucralfate (CARAFATE) 1 gram tablet 1 g.      OTHER,NON-FORMULARY, Please dispense a 5 ml solution of Trimix consisting of PGE 20 mcg/Papaverine 30 mg/Phentolamine 2 mg per ml. Patient is to inject 0.4-0.5 ml. 10 Each 11    Syringe with Needle,Disp, Tray (ALLERGIST TRAY 1CC 27GX1/2\") 1 mL 27 x 1/2\" tray Please dispense 25 each/one tray BD brand allergy syringes. To be used as directed. 25 Each 2    cholecalciferol (VITAMIN D3) 1,000 unit tablet Take 2 Tabs by mouth daily. 60 Tab 0    clonazePAM (KLONOPIN) 0.5 mg tablet Take 1 Tab by mouth two (2) times daily as needed. 60 Tab 0    cloNIDine (CATAPRES) 0.1 mg tablet Take 1 Tab by mouth two (2) times a day. 100 Tab 0    doxepin (SINEQUAN) 25 mg capsule Take 1 Cap by mouth nightly. 60 Cap 0    metoprolol (LOPRESSOR) 100 mg tablet Take 1 Tab by mouth two (2) times a day. 60 Tab 0    terazosin (HYTRIN) 10 mg capsule Take 1 Cap by mouth nightly.  60 Cap 0    amLODIPine (NORVASC) 5 mg tablet Take 1 Tab by mouth daily. 30 Tab 0    oxyCODONE IR (ROXICODONE) 5 mg immediate release tablet Take 1 Tab by mouth every four (4) hours as needed. 100 Tab 0    zolpidem (AMBIEN) 5 mg tablet Take 1 Tab by mouth nightly as needed for Sleep. 30 Tab 0    Omeprazole delayed release (PRILOSEC D/R) 20 mg tablet Take 20 mg by mouth daily. Past History     Past Medical History:  Past Medical History:   Diagnosis Date    Alcohol dependence, episodic drinking behavior ([facility] [address]     Other and Unspecified    Anxiety state     Arthritis     BPH with obstruction/lower urinary tract symptoms     Carpal tunnel syndrome     Chronic kidney disease, stage III (moderate) (HCC)     Dyslipidemia     Elevated PSA 2011    Esophageal reflux     Essential hypertension, benign     Exercise tolerance finding July 2015    GERD (gastroesophageal reflux disease)     Gouty arthropathy     History of blood transfusion     Hypopotassemia     Hyposmolality and/or hyponatremia     Irritable bowel syndrome     Knee pain, left     Lumbago     Lumbar back pain     S/P TKR (total knee replacement) 2013    Scoliosis deformity of spine     Sleep apnea        Past Surgical History:  Past Surgical History:   Procedure Laterality Date    HX CHOLECYSTECTOMY      HX HERNIA REPAIR      HX OTHER SURGICAL  07.31.2015    HX ADJACENT TISSUE TRANSFER/REARRANGEMENT       Family History:  Family History   Problem Relation Age of Onset    Hypertension Father     Diabetes Mother        Social History:  Social History     Tobacco Use    Smoking status: Former Smoker    Smokeless tobacco: Never Used   Substance Use Topics    Alcohol use: Yes     Alcohol/week: 1.8 oz     Types: 3 Cans of beer per week    Drug use: No       Allergies:  No Known Allergies      Review of Systems       Review of Systems   Constitutional: Negative for diaphoresis. HENT: Negative for congestion.     Eyes: Negative for discharge. Respiratory: Negative for stridor. Cardiovascular: Negative for palpitations. Gastrointestinal: Negative for diarrhea. Genitourinary: Negative for flank pain. Musculoskeletal: Negative for back pain. Neurological: Positive for facial asymmetry and speech difficulty. Negative for weakness. Psychiatric/Behavioral: Negative for hallucinations. All other systems reviewed and are negative. Physical Exam     Visit Vitals  /59   Pulse 71   Temp 97.7 °F (36.5 °C)   Resp 24   Ht 5' 7\" (1.702 m)   Wt 81.6 kg (180 lb)   SpO2 100%   BMI 28.19 kg/m²         Physical Exam   Constitutional: He is oriented to person, place, and time. He appears well-developed and well-nourished. No distress. HENT:   Head: Normocephalic and atraumatic. Mouth/Throat: Mucous membranes are normal.   Eyes: Conjunctivae are normal. Pupils are equal, round, and reactive to light. Neck: Normal range of motion. Neck supple. Cardiovascular: Normal rate and regular rhythm. Exam reveals no gallop and no friction rub. No murmur heard. Pulmonary/Chest: Effort normal and breath sounds normal. No respiratory distress. He has no wheezes. He has no rales. Abdominal: Soft. Bowel sounds are normal. He exhibits no distension. There is no tenderness. Musculoskeletal: Normal range of motion. Neurological: He is alert and oriented to person, place, and time. No cranial nerve deficit. Negative pronator drift  Maybe right sided facial droop  5/5 Strength  NIHSS of 3   Skin: Skin is warm and dry.          Diagnostic Study Results     Labs -  Recent Results (from the past 12 hour(s))   GLUCOSE, POC    Collection Time: 10/22/18  1:44 PM   Result Value Ref Range    Glucose (POC) 122 (H) 70 - 110 mg/dL   TYPE & SCREEN    Collection Time: 10/22/18  1:45 PM   Result Value Ref Range    Crossmatch Expiration 10/25/2018     ABO/Rh(D) PENDING     Antibody screen PENDING    CBC W/O DIFF    Collection Time: 10/22/18  1:55 PM Result Value Ref Range    WBC 5.4 4.6 - 13.2 K/uL    RBC 4.41 (L) 4.70 - 5.50 M/uL    HGB 14.1 13.0 - 16.0 g/dL    HCT 40.8 36.0 - 48.0 %    MCV 92.5 74.0 - 97.0 FL    MCH 32.0 24.0 - 34.0 PG    MCHC 34.6 31.0 - 37.0 g/dL    RDW 13.5 11.6 - 14.5 %    PLATELET 887 972 - 178 K/uL    MPV 10.0 9.2 - 49.3 FL   METABOLIC PANEL, COMPREHENSIVE    Collection Time: 10/22/18  1:55 PM   Result Value Ref Range    Sodium 143 136 - 145 mmol/L    Potassium 3.6 3.5 - 5.5 mmol/L    Chloride 107 100 - 108 mmol/L    CO2 30 21 - 32 mmol/L    Anion gap 6 3.0 - 18 mmol/L    Glucose 125 (H) 74 - 99 mg/dL    BUN 14 7.0 - 18 MG/DL    Creatinine 1.41 (H) 0.6 - 1.3 MG/DL    BUN/Creatinine ratio 10 (L) 12 - 20      GFR est AA 58 (L) >60 ml/min/1.73m2    GFR est non-AA 48 (L) >60 ml/min/1.73m2    Calcium 8.7 8.5 - 10.1 MG/DL    Bilirubin, total 0.6 0.2 - 1.0 MG/DL    ALT (SGPT) 30 16 - 61 U/L    AST (SGOT) 17 15 - 37 U/L    Alk.  phosphatase 85 45 - 117 U/L    Protein, total 6.3 (L) 6.4 - 8.2 g/dL    Albumin 3.6 3.4 - 5.0 g/dL    Globulin 2.7 2.0 - 4.0 g/dL    A-G Ratio 1.3 0.8 - 1.7     PROTHROMBIN TIME + INR    Collection Time: 10/22/18  1:55 PM   Result Value Ref Range    Prothrombin time 13.0 11.5 - 15.2 sec    INR 1.0 0.8 - 1.2     THROMBIN TIME    Collection Time: 10/22/18  1:55 PM   Result Value Ref Range    Thrombin time 16.5 13.8 - 18.2 SECS   FIBRINOGEN    Collection Time: 10/22/18  1:55 PM   Result Value Ref Range    Fibrinogen 332 210 - 451 mg/dL   ASPIRIN TEST    Collection Time: 10/22/18  1:55 PM   Result Value Ref Range    Aspirin test 575 (L) 620 - 672 ARU   HEMOGLOBIN A1C W/O EAG    Collection Time: 10/22/18  1:55 PM   Result Value Ref Range    Hemoglobin A1c 5.6 4.2 - 5.6 %   LIPID PANEL    Collection Time: 10/22/18  1:55 PM   Result Value Ref Range    LIPID PROFILE          Cholesterol, total 133 <200 MG/DL    Triglyceride 185 (H) <150 MG/DL    HDL Cholesterol 44 40 - 60 MG/DL    LDL, calculated 52 0 - 100 MG/DL    VLDL, calculated 37 MG/DL    CHOL/HDL Ratio 3.0 0 - 5.0     TROPONIN I    Collection Time: 10/22/18  1:55 PM   Result Value Ref Range    Troponin-I, Qt. 0.02 0.0 - 0.045 NG/ML   EKG, 12 LEAD, INITIAL    Collection Time: 10/22/18  2:21 PM   Result Value Ref Range    Ventricular Rate 58 BPM    Atrial Rate 58 BPM    P-R Interval 164 ms    QRS Duration 102 ms    Q-T Interval 414 ms    QTC Calculation (Bezet) 406 ms    Calculated P Axis -4 degrees    Calculated R Axis -3 degrees    Calculated T Axis 41 degrees    Diagnosis       Sinus bradycardia with occasional premature ventricular complexes  Otherwise normal ECG  When compared with ECG of 05-DEC-2017 15:30,  premature ventricular complexes are now present     URINALYSIS W/ RFLX MICROSCOPIC    Collection Time: 10/22/18  2:45 PM   Result Value Ref Range    Color YELLOW      Appearance CLEAR      Specific gravity 1.014 1.005 - 1.030      pH (UA) 6.5 5.0 - 8.0      Protein NEGATIVE  NEG mg/dL    Glucose NEGATIVE  NEG mg/dL    Ketone NEGATIVE  NEG mg/dL    Bilirubin NEGATIVE  NEG      Blood NEGATIVE  NEG      Urobilinogen 0.2 0.2 - 1.0 EU/dL    Nitrites NEGATIVE  NEG      Leukocyte Esterase NEGATIVE  NEG         Radiologic Studies -   XR CHEST PORT   Final Result      CT HEAD WO CONT   Final Result      MRI BRAIN WO CONT    (Results Pending)         Medical Decision Making   I am the first provider for this patient. I reviewed the vital signs, available nursing notes, past medical history, past surgical history, family history and social history. Vital Signs-Reviewed the patient's vital signs. EKG: Interpreted by the EP. Time Interpreted:14:2 5   Rate: 58   Rhythm: sinus bradycardia   Interpretation:Borderline wide QRS. Normal axis. No signs of ischemia. Frequent PVC's. Records Reviewed: Nursing Notes    ED Course: Progress Notes, Reevaluation, and Consults:  3:37 PM Consult: I discussed care with Dr. Isabell Hernandez (Hospitalist).   It was a standard discussion including patient history, chief complaint, available diagnostic results, and predicted treatment course. Agrees to admit. Provider Notes (Medical Decision Making):     MDM  Number of Diagnoses or Management Options  TIA (transient ischemic attack):   Diagnosis management comments: Diff dx: TIA, CVA, ICH, intoxication    Pt presenting with sudden onset dysarthria, no other deficits except memory,NIHSS 3, will stroke alert based on age and rapidity of onset, denies drinking, will start with ct non con of the head, check a1c, lipid panel, trop, ekg. No nystagmus, no weakness, neg pronator, do not suspect ICH, not on any anticoagulation. Will give asa 325    2:19 PM  D/w neurology, no obvious bleed on ct head, they recommend admit for MRI, give aspirin and atorvatstatin. His speech is resolving so this is likely a TIA    Shawn Schulte MD                For Hospitalized Patients:    2. Hospitalization Decision Time:  The decision to hospitalize the patient was made by Dr. Farhan Mccallum at 3:38 PM  on 10/22/2018    3. Aspirin: Aspirin was given      Diagnosis     Clinical Impression:   1. TIA (transient ischemic attack)        Disposition: Admit    Follow-up Information    None             Medication List      CONTINUE taking these medications    amLODIPine 5 mg tablet  Commonly known as:  NORVASC  Take 1 Tab by mouth daily. cholecalciferol 1,000 unit tablet  Commonly known as:  VITAMIN D3  Take 2 Tabs by mouth daily. clonazePAM 0.5 mg tablet  Commonly known as:  KlonoPIN  Take 1 Tab by mouth two (2) times daily as needed. cloNIDine HCl 0.1 mg tablet  Commonly known as:  CATAPRES  Take 1 Tab by mouth two (2) times a day. cyclobenzaprine 10 mg tablet  Commonly known as:  FLEXERIL     doxepin 25 mg capsule  Commonly known as:  SINEquan  Take 1 Cap by mouth nightly.      HYDROcodone-acetaminophen 5-325 mg per tablet  Commonly known as:  NORCO     metoprolol tartrate 100 mg IR tablet  Commonly known as: LOPRESSOR  Take 1 Tab by mouth two (2) times a day. MULTIVITAMIN PO     Omeprazole delayed release 20 mg tablet  Commonly known as:  PRILOSEC D/R     OTHER(NON-FORMULARY)  Please dispense a 5 ml solution of Trimix consisting of PGE 20 mcg/Papaverine 30 mg/Phentolamine 2 mg per ml. Patient is to inject 0.4-0.5 ml.     oxyCODONE IR 5 mg immediate release tablet  Commonly known as:  ROXICODONE  Take 1 Tab by mouth every four (4) hours as needed. sucralfate 1 gram tablet  Commonly known as:  CARAFATE     Syringe with Needle(Disp) Tray 1 mL 27 x 1/2\" Tray  Commonly known as: Allergist Tray 1cc 27Gx1/2\"  Please dispense 25 each/one tray BD brand allergy syringes. To be used as directed. terazosin 10 mg capsule  Commonly known as:  HYTRIN  Take 1 Cap by mouth nightly. zolpidem 5 mg tablet  Commonly known as:  AMBIEN  Take 1 Tab by mouth nightly as needed for Sleep.          _______________________________    Attestations:  Scribe [id number] [facility] [id number] acting as a scribe for and in the presence of Sarah Espino MD      October 22, 2018 at 1:42 PM       Provider Attestation:      I personally performed the services described in the documentation, reviewed the documentation, as recorded by the scribe in my presence, and it accurately and completely records my words and actions.  October 22, 2018 at 1:42 PM - Sarah Espino MD    ___________________  ____________

## 2018-10-22 NOTE — PROGRESS NOTES
1730- Patient arrived to unit via stretcher, ambulated to bed. Alert, oriented x4, no complaints of pain. Slurred speech noted. Oriented to room and call bell, encouraged to call for assistance. 1810- Bedside swallow eval done, no dysphagia noted. Dr John Alvarado paged for diet orders. 1820- Orders received for Regular Diet. RBV.   1935-Bedside and Verbal shift change report given to Lisbeth Weber RN  (oncoming nurse) by Uzair Ocampo (offgoing nurse). Report included the following information SBAR, Kardex, Intake/Output, MAR and Recent Results.

## 2018-10-23 ENCOUNTER — APPOINTMENT (OUTPATIENT)
Dept: CT IMAGING | Age: 83
DRG: 066 | End: 2018-10-23
Attending: FAMILY MEDICINE
Payer: MEDICARE

## 2018-10-23 ENCOUNTER — APPOINTMENT (OUTPATIENT)
Dept: NON INVASIVE DIAGNOSTICS | Age: 83
DRG: 066 | End: 2018-10-23
Attending: NURSE PRACTITIONER
Payer: MEDICARE

## 2018-10-23 PROBLEM — I63.412 CEREBROVASCULAR ACCIDENT (CVA) DUE TO EMBOLISM OF LEFT MIDDLE CEREBRAL ARTERY (HCC): Status: ACTIVE | Noted: 2018-10-22

## 2018-10-23 LAB
ANION GAP SERPL CALC-SCNC: 7 MMOL/L (ref 3–18)
BUN SERPL-MCNC: 15 MG/DL (ref 7–18)
BUN/CREAT SERPL: 12 (ref 12–20)
CALCIUM SERPL-MCNC: 8.4 MG/DL (ref 8.5–10.1)
CHLORIDE SERPL-SCNC: 107 MMOL/L (ref 100–108)
CO2 SERPL-SCNC: 28 MMOL/L (ref 21–32)
CREAT SERPL-MCNC: 1.22 MG/DL (ref 0.6–1.3)
CRP SERPL HS-MCNC: 6.01 MG/L (ref 0–3)
ERYTHROCYTE [DISTWIDTH] IN BLOOD BY AUTOMATED COUNT: 13.2 % (ref 11.6–14.5)
GLUCOSE BLD STRIP.AUTO-MCNC: 120 MG/DL (ref 70–110)
GLUCOSE BLD STRIP.AUTO-MCNC: 125 MG/DL (ref 70–110)
GLUCOSE SERPL-MCNC: 102 MG/DL (ref 74–99)
HCT VFR BLD AUTO: 39.9 % (ref 36–48)
HGB BLD-MCNC: 13.7 G/DL (ref 13–16)
MCH RBC QN AUTO: 31.5 PG (ref 24–34)
MCHC RBC AUTO-ENTMCNC: 34.3 G/DL (ref 31–37)
MCV RBC AUTO: 91.7 FL (ref 74–97)
PLATELET # BLD AUTO: 184 K/UL (ref 135–420)
PMV BLD AUTO: 10.5 FL (ref 9.2–11.8)
POTASSIUM SERPL-SCNC: 3 MMOL/L (ref 3.5–5.5)
RBC # BLD AUTO: 4.35 M/UL (ref 4.7–5.5)
SODIUM SERPL-SCNC: 142 MMOL/L (ref 136–145)
WBC # BLD AUTO: 5.2 K/UL (ref 4.6–13.2)

## 2018-10-23 PROCEDURE — 97162 PT EVAL MOD COMPLEX 30 MIN: CPT

## 2018-10-23 PROCEDURE — 70496 CT ANGIOGRAPHY HEAD: CPT

## 2018-10-23 PROCEDURE — 97116 GAIT TRAINING THERAPY: CPT

## 2018-10-23 PROCEDURE — 85027 COMPLETE CBC AUTOMATED: CPT | Performed by: FAMILY MEDICINE

## 2018-10-23 PROCEDURE — 74011000258 HC RX REV CODE- 258: Performed by: FAMILY MEDICINE

## 2018-10-23 PROCEDURE — 82962 GLUCOSE BLOOD TEST: CPT

## 2018-10-23 PROCEDURE — 74011250636 HC RX REV CODE- 250/636: Performed by: FAMILY MEDICINE

## 2018-10-23 PROCEDURE — 36415 COLL VENOUS BLD VENIPUNCTURE: CPT | Performed by: FAMILY MEDICINE

## 2018-10-23 PROCEDURE — 74011250637 HC RX REV CODE- 250/637: Performed by: NURSE PRACTITIONER

## 2018-10-23 PROCEDURE — 74011636320 HC RX REV CODE- 636/320: Performed by: FAMILY MEDICINE

## 2018-10-23 PROCEDURE — 97535 SELF CARE MNGMENT TRAINING: CPT

## 2018-10-23 PROCEDURE — 74011000258 HC RX REV CODE- 258: Performed by: NURSE PRACTITIONER

## 2018-10-23 PROCEDURE — 65660000000 HC RM CCU STEPDOWN

## 2018-10-23 PROCEDURE — 92610 EVALUATE SWALLOWING FUNCTION: CPT

## 2018-10-23 PROCEDURE — 97165 OT EVAL LOW COMPLEX 30 MIN: CPT

## 2018-10-23 PROCEDURE — 74011000250 HC RX REV CODE- 250: Performed by: NURSE PRACTITIONER

## 2018-10-23 PROCEDURE — 74011250636 HC RX REV CODE- 250/636: Performed by: NURSE PRACTITIONER

## 2018-10-23 PROCEDURE — 80048 BASIC METABOLIC PNL TOTAL CA: CPT | Performed by: FAMILY MEDICINE

## 2018-10-23 RX ORDER — CLOPIDOGREL BISULFATE 75 MG/1
75 TABLET ORAL DAILY
Status: DISCONTINUED | OUTPATIENT
Start: 2018-10-23 | End: 2018-10-25 | Stop reason: HOSPADM

## 2018-10-23 RX ORDER — SODIUM CHLORIDE 9 MG/ML
100 INJECTION, SOLUTION INTRAVENOUS
Status: COMPLETED | OUTPATIENT
Start: 2018-10-23 | End: 2018-10-23

## 2018-10-23 RX ORDER — SODIUM CHLORIDE 9 MG/ML
100 INJECTION, SOLUTION INTRAVENOUS CONTINUOUS
Status: DISCONTINUED | OUTPATIENT
Start: 2018-10-23 | End: 2018-10-25

## 2018-10-23 RX ADMIN — HEPARIN SODIUM 5000 UNITS: 5000 INJECTION INTRAVENOUS; SUBCUTANEOUS at 17:52

## 2018-10-23 RX ADMIN — SODIUM CHLORIDE 100 ML/HR: 900 INJECTION, SOLUTION INTRAVENOUS at 21:29

## 2018-10-23 RX ADMIN — IOPAMIDOL 68 ML: 755 INJECTION, SOLUTION INTRAVENOUS at 09:59

## 2018-10-23 RX ADMIN — SODIUM CHLORIDE 100 ML/HR: 900 INJECTION, SOLUTION INTRAVENOUS at 11:47

## 2018-10-23 RX ADMIN — SENNOSIDES-DOCUSATE SODIUM TAB 8.6-50 MG 2 TABLET: 8.6-5 TAB at 22:13

## 2018-10-23 RX ADMIN — FOLIC ACID: 5 INJECTION, SOLUTION INTRAMUSCULAR; INTRAVENOUS; SUBCUTANEOUS at 17:51

## 2018-10-23 RX ADMIN — HEPARIN SODIUM 5000 UNITS: 5000 INJECTION INTRAVENOUS; SUBCUTANEOUS at 02:36

## 2018-10-23 RX ADMIN — HEPARIN SODIUM 5000 UNITS: 5000 INJECTION INTRAVENOUS; SUBCUTANEOUS at 09:36

## 2018-10-23 RX ADMIN — CLOPIDOGREL BISULFATE 75 MG: 75 TABLET ORAL at 11:53

## 2018-10-23 RX ADMIN — ASPIRIN 325 MG ORAL TABLET 325 MG: 325 PILL ORAL at 09:36

## 2018-10-23 RX ADMIN — SODIUM CHLORIDE 50 ML: 900 INJECTION, SOLUTION INTRAVENOUS at 10:00

## 2018-10-23 NOTE — PROGRESS NOTES
Assume care of patient lying in bed watching TV. Alert and oriented X 4. Bed locked in lowest position. Call light within reach and understand to use for assistance and needs. Dual NIH completed with Neymar Jimenez RN.    2200 Patient restless and watching TV. Complained of feeling tired and sleepy, unable to fall asleep. 10/23/2018    0600 Requesting physician to order sleeping medication for night. 0730 Bedside and Verbal shift change report given to ARIS Saleh (oncoming nurse) by Cheo Rodriguez RN (offgoing nurse). Report given with SBAR, Kardex, Intake/Output, MAR and Recent Results.

## 2018-10-23 NOTE — PROGRESS NOTES
Progress Note      Patient: Jesse Christopher               Sex: male          DOA: 10/22/2018       YOB: 1934      Age:  80 y.o.        LOS:  LOS: 1 day               Subjective / Interval Hx  CE Christopher is a 80 y.o. male  With hx hypertension , hyperlipidemia , bph , sleep disorder, GERD who presents with mild slurred speech and admitted for stroke . MRI brain showed Multifocal pattern of acute infarction in the left hemisphere, primarily MCA territory, likely embolic without hemorrhage or significant mass effect. Patient denies palpitation and focal weakness. His speech is back at baseline. Patient also c/o chronic sleep disorder and was scheduled for sleep clinic 10/30/18. Objective:      Visit Vitals  /84 (BP 1 Location: Left arm, BP Patient Position: At rest)   Pulse 70   Temp 98.2 °F (36.8 °C)   Resp 16   Ht 5' 7\" (1.702 m)   Wt 81.6 kg (180 lb)   SpO2 99%   BMI 28.19 kg/m²             Physical Exam   Constitutional: He is oriented to person, place, and time. He appears well-developed and well-nourished. No distress. HENT:   Head: Normocephalic and atraumatic. Mouth/Throat: No oropharyngeal exudate. Eyes: Conjunctivae are normal. No scleral icterus. Neck: Neck supple. Cardiovascular: Normal rate, regular rhythm and normal heart sounds. No murmur heard. Pulmonary/Chest: Effort normal and breath sounds normal. No respiratory distress. He has no wheezes. He has no rales. Abdominal: Soft. Bowel sounds are normal.   Musculoskeletal: He exhibits no edema. Neurological: He is alert and oriented to person, place, and time. He has normal strength. No cranial nerve deficit or sensory deficit. Skin: Skin is warm. No rash noted. He is not diaphoretic. No erythema. No pallor. Psychiatric: He has a normal mood and affect. Intake and Output:  Current Shift:  No intake/output data recorded.   Last three shifts:  10/21 1901 - 10/23 0700  In: 180 [P.O.:180]  Out: 700 [Urine:700]    Recent Results (from the past 48 hour(s))   GLUCOSE, POC    Collection Time: 10/22/18  1:44 PM   Result Value Ref Range    Glucose (POC) 122 (H) 70 - 110 mg/dL   TYPE & SCREEN    Collection Time: 10/22/18  1:45 PM   Result Value Ref Range    Crossmatch Expiration 10/25/2018     ABO/Rh(D) A POSITIVE     Antibody screen POS     Antibody ID [id number]([id number])     Unit number [id number]     Blood component type [facility]     Unit division 00     Status of unit ALLOCATED     ANTIGEN/ANTIBODY INFO FY(A) NEGATIVE,     Crossmatch result Compatible     Unit number [id number]     Blood component type [facility]     Unit division 00     Status of unit ALLOCATED     ANTIGEN/ANTIBODY INFO FY(A) NEGATIVE,     Crossmatch result Compatible    CBC W/O DIFF    Collection Time: 10/22/18  1:55 PM   Result Value Ref Range    WBC 5.4 4.6 - 13.2 K/uL    RBC 4.41 (L) 4.70 - 5.50 M/uL    HGB 14.1 13.0 - 16.0 g/dL    HCT 40.8 36.0 - 48.0 %    MCV 92.5 74.0 - 97.0 FL    MCH 32.0 24.0 - 34.0 PG    MCHC 34.6 31.0 - 37.0 g/dL    RDW 13.5 11.6 - 14.5 %    PLATELET 186 228 - 923 K/uL    MPV 10.0 9.2 - 08.8 FL   METABOLIC PANEL, COMPREHENSIVE    Collection Time: 10/22/18  1:55 PM   Result Value Ref Range    Sodium 143 136 - 145 mmol/L    Potassium 3.6 3.5 - 5.5 mmol/L    Chloride 107 100 - 108 mmol/L    CO2 30 21 - 32 mmol/L    Anion gap 6 3.0 - 18 mmol/L    Glucose 125 (H) 74 - 99 mg/dL    BUN 14 7.0 - 18 MG/DL    Creatinine 1.41 (H) 0.6 - 1.3 MG/DL    BUN/Creatinine ratio 10 (L) 12 - 20      GFR est AA 58 (L) >60 ml/min/1.73m2    GFR est non-AA 48 (L) >60 ml/min/1.73m2    Calcium 8.7 8.5 - 10.1 MG/DL    Bilirubin, total 0.6 0.2 - 1.0 MG/DL    ALT (SGPT) 30 16 - 61 U/L    AST (SGOT) 17 15 - 37 U/L    Alk.  phosphatase 85 45 - 117 U/L    Protein, total 6.3 (L) 6.4 - 8.2 g/dL    Albumin 3.6 3.4 - 5.0 g/dL    Globulin 2.7 2.0 - 4.0 g/dL    A-G Ratio 1.3 0.8 - 1.7     PROTHROMBIN TIME + INR    Collection Time: 10/22/18  1:55 PM   Result Value Ref Range    Prothrombin time 13.0 11.5 - 15.2 sec    INR 1.0 0.8 - 1.2     THROMBIN TIME    Collection Time: 10/22/18  1:55 PM   Result Value Ref Range    Thrombin time 16.5 13.8 - 18.2 SECS   FIBRINOGEN    Collection Time: 10/22/18  1:55 PM   Result Value Ref Range    Fibrinogen 332 210 - 451 mg/dL   ASPIRIN TEST    Collection Time: 10/22/18  1:55 PM   Result Value Ref Range    Aspirin test 575 (L) 620 - 672 ARU   HEMOGLOBIN A1C W/O EAG    Collection Time: 10/22/18  1:55 PM   Result Value Ref Range    Hemoglobin A1c 5.6 4.2 - 5.6 %   LIPID PANEL    Collection Time: 10/22/18  1:55 PM   Result Value Ref Range    LIPID PROFILE          Cholesterol, total 133 <200 MG/DL    Triglyceride 185 (H) <150 MG/DL    HDL Cholesterol 44 40 - 60 MG/DL    LDL, calculated 52 0 - 100 MG/DL    VLDL, calculated 37 MG/DL    CHOL/HDL Ratio 3.0 0 - 5.0     TROPONIN I    Collection Time: 10/22/18  1:55 PM   Result Value Ref Range    Troponin-I, Qt. 0.02 0.0 - 0.045 NG/ML   LIPID PANEL    Collection Time: 10/22/18  1:55 PM   Result Value Ref Range    LIPID PROFILE          Cholesterol, total 122 <200 MG/DL    Triglyceride 168 (H) <150 MG/DL    HDL Cholesterol 41 40 - 60 MG/DL    LDL, calculated 47.4 0 - 100 MG/DL    VLDL, calculated 33.6 MG/DL    CHOL/HDL Ratio 3.0 0 - 5.0     SED RATE (ESR)    Collection Time: 10/22/18  1:55 PM   Result Value Ref Range    Sed rate, automated 5 0 - 20 mm/hr   TSH 3RD GENERATION    Collection Time: 10/22/18  1:55 PM   Result Value Ref Range    TSH 1.40 0.36 - 3.74 uIU/mL   T3, FREE    Collection Time: 10/22/18  1:55 PM   Result Value Ref Range    Triiodothyronine (T3), free 2.4 2.18 - 3.98 PG/ML   T4, FREE    Collection Time: 10/22/18  1:55 PM   Result Value Ref Range    T4, Free 1.0 0.7 - 1.5 NG/DL   EKG, 12 LEAD, INITIAL    Collection Time: 10/22/18  2:21 PM   Result Value Ref Range    Ventricular Rate 58 BPM    Atrial Rate 58 BPM    P-R Interval 164 ms    QRS Duration 102 ms    Q-T Interval 414 ms    QTC Calculation (Bezet) 406 ms    Calculated P Axis -4 degrees    Calculated R Axis -3 degrees    Calculated T Axis 41 degrees    Diagnosis       Sinus bradycardia with occasional premature ventricular complexes  Otherwise normal ECG  When compared with ECG of 05-DEC-2017 15:30,  premature ventricular complexes are now present  Confirmed by Chung Leblanc ([id number]) on 10/22/2018 3:47:32 PM     URINALYSIS W/ RFLX MICROSCOPIC    Collection Time: 10/22/18  2:45 PM   Result Value Ref Range    Color YELLOW      Appearance CLEAR      Specific gravity 1.014 1.005 - 1.030      pH (UA) 6.5 5.0 - 8.0      Protein NEGATIVE  NEG mg/dL    Glucose NEGATIVE  NEG mg/dL    Ketone NEGATIVE  NEG mg/dL    Bilirubin NEGATIVE  NEG      Blood NEGATIVE  NEG      Urobilinogen 0.2 0.2 - 1.0 EU/dL    Nitrites NEGATIVE  NEG      Leukocyte Esterase NEGATIVE  NEG     DRUG SCREEN, URINE    Collection Time: 10/22/18  2:45 PM   Result Value Ref Range    BENZODIAZEPINES NEGATIVE  NEG      BARBITURATES NEGATIVE  NEG      THC (TH-CANNABINOL) NEGATIVE  NEG      OPIATES NEGATIVE  NEG      PCP(PHENCYCLIDINE) NEGATIVE  NEG      COCAINE NEGATIVE  NEG      AMPHETAMINES NEGATIVE  NEG      METHADONE NEGATIVE  NEG      HDSCOM (NOTE)    GLUCOSE, POC    Collection Time: 10/22/18  6:26 PM   Result Value Ref Range    Glucose (POC) 140 (H) 70 - 110 mg/dL   GLUCOSE, POC    Collection Time: 10/22/18 10:40 PM   Result Value Ref Range    Glucose (POC) 122 (H) 70 - 110 mg/dL       Lab/Data Reviewed: All lab results for the last 24 hours reviewed.     XRays were reviewed in past 24 hours    Medications Reviewed            Assessment/Plan     Principal Problem:    Stroke (cerebrum) ([facility] [address] (10/22/2018)    Active Problems:    BPH (benign prostatic hyperplasia) (11/18/2013)      Essential hypertension, benign (11/18/2013)      TIA (transient ischemic attack) (10/22/2018)        Care plan   Stroke   - MRI brain 10/22 showed Multifocal pattern of acute infarction in the left hemisphere, primarily MCA  territory, likely embolic without hemorrhage or significant mass effect  - Permissible hypertension per stroke protocol   - aspirin , Lipitor   - PT/OT/Speech evaluation   - ECHO ordered   - CTA head and neck ordered   - Neurology consult today     Hypertension   - permissible htn per stroke protocol for 48 hours     BPH   - Home med     HLD  - Lipitor     Sleep disorder   - follow up with appointment out patient     DVT prophylaxis - heparin     Full code       Shashi Jansen MD  October 23, 2018

## 2018-10-23 NOTE — PROGRESS NOTES
Problem: Falls - Risk of  Goal: *Absence of Falls  Document Kinza Fall Risk and appropriate interventions in the flowsheet.   Outcome: Progressing Towards Goal  Fall Risk Interventions:            Medication Interventions: Patient to call before getting OOB

## 2018-10-23 NOTE — PROGRESS NOTES
Problem: Dysphagia (Adult)  Goal: *Acute Goals and Plan of Care (Insert Text)  Patient will:  1. Participate in training and education related to continued aspiration risk, diet recs and compensatory strategies (goal met). Outcome: Resolved/Met Date Met: 10/23/18  Speech LAnguage Pathology bedside swallow evaluation AND DISCHARGE    Patient: Tracy Bridges (65 y.o. male)  Date: 10/23/2018  Primary Diagnosis: TIA (transient ischemic attack)       Precautions: N/A     PLOF: Independent  ASSESSMENT :  Clinical beside swallow eval completed per MD orders. Pt A&Ox4. Functional communication. Intelligibility >90%. Cognitive-linguistic function appears intact. OM examination revealed oral motor structures functional for mastication and deglutition. Presented with thin liquid, puree, and solid trials. Exhibited functional bolus cohesion, manipulation and A-P transit. Further exhibited adequate swallow timing/reflex and hyolaryngeal excursion. Pt able to manipulate and clear with 0 clinical s/s aspiration and/or oropharyngeal dysphagia. Pt safe for regular solid, thin liquid diet. 0 formal ST needs for dysphagia indicated at this time. SLP educated pt on role of speech therapist in current setting with re: speech/swallow; verbalized comprehension. SLP available for re-evaluation if indicated by MD. Results d/w RN, Adolfo Muniz. Thank you for this referral.   José Luis Alvarez M.S. CF-SLP     PLAN :  Recommendations and Planned Interventions:  No formal ST needs ID'd for dysphagia. Eval only. Discharge Recommendations: None     SUBJECTIVE:   Patient stated I'm a [employer] fan.     OBJECTIVE:     Past Medical History:   Diagnosis Date    Alcohol dependence, episodic drinking behavior ([facility] [address]     Other and Unspecified    Anxiety state     Arthritis     BPH with obstruction/lower urinary tract symptoms     Carpal tunnel syndrome     Chronic kidney disease, stage III (moderate) (HCC)     Dyslipidemia     Elevated PSA 2011  Esophageal reflux     Essential hypertension, benign     Exercise tolerance finding July 2015    GERD (gastroesophageal reflux disease)     Gouty arthropathy     History of blood transfusion     Hypopotassemia     Hyposmolality and/or hyponatremia     Irritable bowel syndrome     Knee pain, left     Lumbago     Lumbar back pain     S/P TKR (total knee replacement) 2013    Scoliosis deformity of spine     Sleep apnea      Past Surgical History:   Procedure Laterality Date    HX CHOLECYSTECTOMY      HX HERNIA REPAIR      HX OTHER SURGICAL  07.31.2015    HX ADJACENT TISSUE TRANSFER/REARRANGEMENT     Prior Level of Function/Home Situation: Independent  Home Situation  Home Environment: Apartment  # Steps to Enter: 0  One/Two Story Residence: One story  Living Alone: Yes  Support Systems: Friends \ neighbors  Patient Expects to be Discharged to[de-identified] Apartment  Current DME Used/Available at Home: None  Diet prior to admission: regular/thin  Current Diet:  Regular/thin   Cognitive and Communication Status:  Neurologic State: Alert  Orientation Level: Oriented X4  Cognition: Appropriate for age attention/concentration  Perception: Appears intact  Perseveration: No perseveration noted  Safety/Judgement: Awareness of environment  Oral Assessment:  Oral Assessment  Labial: No impairment  Dentition: Upper dentures; Natural  Oral Hygiene: good  Lingual: No impairment  Velum: No impairment  Mandible: No impairment  P.O. Trials:  Patient Position: Sitting upright  Vocal quality prior to P.O.: No impairment  Consistency Presented: Thin liquid; Solid;Puree  How Presented: Self-fed/presented     Bolus Acceptance: No impairment  Bolus Formation/Control: No impairment     Propulsion: No impairment  Oral Residue: None  Initiation of Swallow: No impairment  Laryngeal Elevation: Functional  Aspiration Signs/Symptoms: None  Pharyngeal Phase Characteristics: No impairment, issues, or problems   Effective Modifications: None  Cues for Modifications: None       Oral Phase Severity: No impairment  Pharyngeal Phase Severity : No impairment    GCODESwallowing:  Swallow Current Status CH= 0%   Swallow Goal Status CH= 0%   Swallow D/C Status CH= 0%    The severity rating is based on the following outcomes:  ISRAEL Noms Swallow Level 7    Clinical Judgement      PAIN:  Start of Eval: 0  End of Eval: 0     After evaluation:   []            Patient left in no apparent distress sitting up in chair  [x]            Patient left in no apparent distress in bed  [x]            Call bell left within reach  [x]            Nursing notified  []            Family present  []            Caregiver present  []            Bed alarm activated      COMMUNICATION/EDUCATION:   [x]            Aspiration precautions; swallow safety; compensatory techniques. []            Patient/family have participated as able in goal setting and plan of care. []            Patient/family agree to work toward stated goals and plan of care. []            Patient understands intent and goals of therapy; neutral about participation. []            Patient unable to participate in goal setting/plan of care; educ ongoing with interdisciplinary staff  []         Posted safety precautions in patient's room.     Thank you for this referral.  Ross Anderson M.S. CF-SLP  Time Calculation: 15 mins

## 2018-10-23 NOTE — PROGRESS NOTES
Nutrition initial assessment/Plan of care      RECOMMENDATIONS:   1. Cardiac Diet  2. Monitor weight, labs and PO intake  3. RD to follow     GOALS:   1. PO intake meets >75% of protein/calorie needs by 10/30  2. Weight Maintenance (+/- 1-2 lb) by 10/30      ASSESSMENT:   Wt status is classified as overweight per BMI of 28.2. Adequate PO intake per vitals. Labs noted. BG range (120-140) over the past 24 hours; A1C (5.6). Pt w/ hypokalemia, hypocalemia and hypertriglyceridemia (185). Nutrition recommendations listed. RD to follow. Nutrition Diagnoses: Altered nutrition related lab values related to CVD as evidenced by and elevated triglyceride level (185). Overweight related to excessive energy intake PTA as evidenced by a BMI of 28.2 and 122% IBW    Nutrition Risk:  [] High  [] Moderate [x]  Low    SUBJECTIVE/OBJECTIVE:   Pt admitted for TIA. PMHx including hypertension, hyperlipidemia, bph, sleep disorder and GERD. Pt seen in room later in afternoon; appears well nourished. Denies having any food allergies or problems chewing/swallowing and stable weight PTA. Reports having a good appetite but only consumes 2 meals per day at home. Pt lives alone and stated \"Im not spending all day cooking in the kitchen. \" Reports son does his shopping for him. Not currently following any type of diet at home. Discussed nutrition recommendations for low Na, low fat, CHO choices and portion control; handouts provided. Will monitor. Information Obtained from:    [x] Chart Review   [x] Patient   [] Family/Caregiver   [] Nurse/Physician   [] Interdisciplinary Meeting/Rounds      Diet: Regular Diet  Medications: [x] Reviewed    Allergies: [x] Reviewed   Encounter Diagnoses     ICD-10-CM ICD-9-CM   1.  TIA (transient ischemic attack) G45.9 435.9     Past Medical History:   Diagnosis Date    Alcohol dependence, episodic drinking behavior ([facility] [address]     Other and Unspecified    Anxiety state     Arthritis     BPH with obstruction/lower urinary tract symptoms     Carpal tunnel syndrome     Chronic kidney disease, stage III (moderate) (HCC)     Dyslipidemia     Elevated PSA 2011    Esophageal reflux     Essential hypertension, benign     Exercise tolerance finding July 2015    GERD (gastroesophageal reflux disease)     Gouty arthropathy     History of blood transfusion     Hypopotassemia     Hyposmolality and/or hyponatremia     Irritable bowel syndrome     Knee pain, left     Lumbago     Lumbar back pain     S/P TKR (total knee replacement) 2013    Scoliosis deformity of spine     Sleep apnea       Labs:    Lab Results   Component Value Date/Time    Sodium 142 10/23/2018 08:20 AM    Potassium 3.0 (L) 10/23/2018 08:20 AM    Chloride 107 10/23/2018 08:20 AM    CO2 28 10/23/2018 08:20 AM    Anion gap 7 10/23/2018 08:20 AM    Glucose 102 (H) 10/23/2018 08:20 AM    BUN 15 10/23/2018 08:20 AM    Creatinine 1.22 10/23/2018 08:20 AM    Calcium 8.4 (L) 10/23/2018 08:20 AM    Magnesium 1.5 (L) 11/20/2013 04:45 AM    Albumin 3.6 10/22/2018 01:55 PM     Anthropometrics: BMI (calculated): 28.2  Last 3 Recorded Weights in this Encounter    10/22/18 1350   Weight: 81.6 kg (180 lb)      Ht Readings from Last 1 Encounters:   10/22/18 5' 7\" (1.702 m)     Weight Metrics 10/22/2018 1/26/2018 11/26/2013 11/18/2013 11/15/2013 11/14/2013   Weight 180 lb 182 lb 182 lb 6.4 oz 182 lb 180 lb -   BMI 28.19 kg/m2 27.67 kg/m2 27.74 kg/m2 27.68 kg/m2 - 26.97 kg/m2       Patient Vitals for the past 100 hrs:   % Diet Eaten   10/23/18 0935 100 %       IBW:148 lb %IBW: 122% UBW: 185 lb   [] Weight Loss [] Weight Gain [x] Weight Stable    Estimated Nutrition Needs: [x] MSJ  [] Other:  Calories: 7679-7085 kcal Based on:   [x] Actual BW    Protein:   82-98 g Based on:   [x] Actual BW    Fluid:       7901-3921 ml Based on:   [x] Actual BW      [x] No Cultural, Lutheran or ethnic dietary need identified.     [] Cultural, Lutheran and ethnic food preferences identified and addressed     Wt Status:  [] Normal (18.6 - 24.9) [] Underweight (<18.5) [x] Overweight (25 - 29.9) [] Mild Obesity (30 - 34.9)  [] Moderate Obesity (35 - 39.9) [] Morbid Obesity (40+)       Nutrition Problems Identified:   [] Suboptimal PO intake   [] Food Allergies  [] Difficulty chewing/swallowing/poor dentition  [] Constipation/Diarrhea   [] Nausea/Vomiting   [x] None  [] Other:      Plan:   [x] Therapeutic Diet  []  Obtained/adjusted food preferences/tolerances and/or snacks options   []  Supplements added   [] Occupational therapy following for feeding techniques  []  HS snack added   []  Modify diet texture   []  Modify diet for food allergies   [x]  Educate patient   []  Assist with menu selection   [x]  Monitor PO intake on meal rounds   [x]  Continue inpatient monitoring and intervention   []  Participated in discharge planning/Interdisciplinary rounds/Team meetings   []  Other:     Education Needs:   [] Not appropriate for teaching at this time due to:   [x] Identified and addressed    Nutrition Monitoring and Evaluation:  [x] Continue ongoing monitoring and intervention  [] Other    Eric Conti

## 2018-10-23 NOTE — PROGRESS NOTES
Problem: Falls - Risk of  Goal: *Absence of Falls  Document Kinza Fall Risk and appropriate interventions in the flowsheet.   Outcome: Progressing Towards Goal  Fall Risk Interventions:            Medication Interventions: Patient to call before getting OOB, Evaluate medications/consider consulting pharmacy

## 2018-10-23 NOTE — PROGRESS NOTES
Assumed care of pt from Magdalene Copeland [place] , RN pt awake in bed A&O x 4 , no distress noted and denies pain. Frequently used items and call bell within reach. Patient verbalized understanding to use call bell for any needs or assistance. Bed locked in lowest position. Dual NIH performed.

## 2018-10-23 NOTE — PROGRESS NOTES
conducted an initial consultation and Spiritual Assessment for Debbie Ho, who is a 80 y.o.,male. Patients Primary Language is: [place]. According to the patients EMR Taoist Affiliation is: Restorationism. The reason the Patient came to the hospital is:   Patient Active Problem List    Diagnosis Date Noted    TIA (transient ischemic attack) 10/22/2018    Stroke (cerebrum) ([facility] [address] 10/22/2018    BPH (benign prostatic hyperplasia) 11/18/2013    Esophageal reflux 11/18/2013    Essential hypertension, benign 11/18/2013    Other and unspecified hyperlipidemia 11/18/2013    ED (erectile dysfunction) 11/18/2013    CRF (chronic renal failure) 11/18/2013        The  provided the following Interventions:  Initiated a relationship of care and support with patient in room 2111 today. Listened empathically as he talked about his being here and his hopes of going home today but agreed that,  that was not going to ha[ppen. Provided information about Spiritual Care Services. Offered prayer and assurance of continued prayers on patients behalf. Chart reviewed  The following outcomes were achieved:  Patient shared limited information about his medical narrative . Patient processed feeling about current hospitalization. Patient expressed gratitude for pastoral care visit. Assessment:  Patient does not have any Anglican/cultural needs that will affect patients preferences in health care. There are no further spiritual or Anglican issues which require Spiritual Care Services interventions at this time. Plan:  Chaplains will continue to follow and will provide pastoral care on an as needed/requested basis    . Olive [facility]   Spiritual Care   [phone]

## 2018-10-23 NOTE — PROGRESS NOTES
NUTRITION  Patient/Family Education Record    FACTORS THAT MAY INFLUENCE PATIENTS ABILITY TO LEARN:   []   Language barrier    []   Cultural needs   []   Motivation    []   Cognitive limitation    []   Physical   []   Education   []   Physiological factors   []   Hearing/vision/speaking impairment   []   Baptism beliefs    []   Financial limitations    []  Other:   [x]   No barriers limiting ability to learn     Person Instructed:   [x]   Patient   []   Family   []  Other     Preference for Learning:   [x]   Verbal   [x]   Written   []  Demonstration     Patient educated on:   [x] Cardiac/heart healthy diet  [x] 2gm Sodium diet  [] Vitamin K regulated diet (coumadin)  [x] Weight loss/portion control  [] High protein  [x] Other:CHO choices    Goal:  Patient will demonstrate understanding of modified diet by implementing suggestions into lifestyle changes once D/C home. Outcome:   [x]  Patient verbalized understanding of education and willing to comply with recommendations.   []  Patient declined education  []  Patient needs follow up education; scheduled date for follow up:  [x]  Written information provided  [x]  RD contact information provided    Kelvin Borja

## 2018-10-23 NOTE — CONSULTS
Neurology Consult Note    Admit Date: 10/22/2018  Length of Stay: 1  Primary Care: Kain Campos MD   Principle Problem: Stroke (cerebrum) [facility])     Assessment:    AIS     Plan:    AIS:  ASA and Plavix for three weeks per POINT trial and the just ASA starting November 12th. Permissive HTN for next 48 hours and then gentle lowering with goal <130/80. LDL 47.4 but triglycerides 168. Atorvastatin in place. Nutrition consult. ECHO and CTA pending. From distribution of stroke, may be cardioembolic. If ECHO negative, would get loop recorder. Would not be eligible for anticoagulant for few weeks even if ECHO positive. Apnea link study. History: Fabio Cabello is an 79yo  male with PMH of HTN, CKD, DLD, BPH, GERD, gout, arthritis, and possible sleep apnea who presented with slurred speech and right facial droop. He called EMS and soon after arriving to the ED his speech went back to baseline. He denies any extremity weakness or word finding issues during the event. Code S protocol was followed and he was seen by teleneurology. Alteplase was not given as he returned to baseline. He has an appointment at [facility] 10/30 for a sleep study. He states that he has has difficulty sleeping. He sleeps about 4-5 hours a night and is tired during the day. Unaware if he snores. 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 Results reviewed:     CT Results (most recent):  Results from [place] encounter on 10/22/18   CT HEAD WO CONT    Narrative EXAM: CT head    INDICATION: Slurred speech. CODE S.    COMPARISON: None. TECHNIQUE: Axial CT imaging of the head was performed without intravenous  contrast. One or more dose reduction techniques were used on this CT: automated  exposure control, adjustment of the mAs and/or kVp according to patient size,  and iterative reconstruction techniques.   The specific techniques used on this  CT exam have been documented in the patient's electronic medical record.    _______________    FINDINGS:    BRAIN:   Mild cortical atrophy most prominent in the frontal region and at the  vertex. There is no intracranial hemorrhage, mass effect, or midline shift. Ill-defined white matter hypodensity in the bilateral parieto-occipital  periventricular and subcortical white matter is nonspecific but is likely  ischemic. No hypodensity in cortex would be consistent with an acute cortical  infarction. EXTRA-AXIAL SPACES AND MENINGES: There are no abnormal extra-axial fluid  collections or mass. CALVARIUM: Intact. OTHER: There is some mild tortuosity of the cerebral vasculature with mild  elongation of the vertebrobasilar system. _______________      Impression IMPRESSION:      1. No acute intracranial abnormalities. No hemorrhage, mass effect or CT evident  acute cortical infarction. 2. Mild cortical atrophy and ischemic white matter change. No other significant  intracranial vascular abnormality is appreciated. Critical result called to Dr. Hardik Boyce at 2:00 PM on 10/22/2018. This result was  acknowledged and understood. MRI Results (most recent):  Results from [place] encounter on 10/22/18   MRI BRAIN WO CONT    Narrative Brain MR without contrast:    Indication: Difficulty speaking, slurred speech. Concern for hemorrhage or acute  infarction. Procedure: Sagittal spin echo T1, axial FSE FLAIR and T2 and axial diffusion  weighted scanning was performed. A susceptibility weighted (SWI) sequence was  performed, quite sensitive for hemosiderin, calcification and venous anatomy. Coronal FSE T2 images were also performed. No contrast was administered. Comparison exam: Head CT earlier today.     Findings: Diffusion: There is a multifocal pattern of cortical and subcortical  restricted diffusion in the inferior left parietal lobe, the superior lateral  left frontal lobe including motor strip extending into the occipital lobe and  occipital periventricular white matter consistent with acute infarction. No  hemorrhage or mass effect. Correlating very low level increased signal FLAIR and  T2 with this acute infarction. The susceptibility weighted scan shows no evidence of acute or chronic  hemorrhage or abnormal venous anatomy. No asymmetry of venous signal in the left  hemisphere. Brain parenchyma: Bilateral thalamic areas of chronic lacunar infarction with 2  lesions on the left and one on the right are noted. Small chronic lacunar  infarction left putamen/external capsule. Patchy ischemic changes in the  periventricular deep and mildly in the peripheral subcortical white matter of  both hemispheres are noted. Tiny chronic infarction superior right cerebellar  hemisphere. No mass or mass effect. Ventricles and sulci: Mild cortical atrophy most prominent at the vertex and  perisylvian region. Ventricular system within normal limits. Extra axial: No extra axial fluid collection or mass. Brain vasculature: Normal, no arterial vascular abnormality is noted. Craniocervical Junction: Normal.    Extracranial and skull base:  Bilateral cataract surgery. Mild mucosal  thickening in the paranasal sinuses. Impression Impression:    1. Multifocal pattern of acute infarction in the left hemisphere, primarily MCA  territory, likely embolic without hemorrhage or significant mass effect. 2. Bilateral thalamic and left basal ganglia chronic lacunar infarctions. Small  chronic infarction right cerebellar hemisphere. 3. Atrophy as described with additional white matter abnormality that is  nonspecific but likely ischemic.        Latest Hemoglobin A1C:  Lab Results   Component Value Date/Time    Hemoglobin A1c 5.6 10/22/2018 01:55 PM       Latest Cardiology Procedure:  Results for orders placed or performed during the hospital encounter of 10/22/18   EKG, 12 LEAD, INITIAL   Result Value Ref Range    Ventricular Rate 58 BPM    Atrial Rate 58 BPM    P-R Interval 164 ms    QRS Duration 102 ms    Q-T Interval 414 ms    QTC Calculation (Bezet) 406 ms    Calculated P Axis -4 degrees    Calculated R Axis -3 degrees    Calculated T Axis 41 degrees    Diagnosis       Sinus bradycardia with occasional premature ventricular complexes  Otherwise normal ECG  When compared with ECG of 05-DEC-2017 15:30,  premature ventricular complexes are now present  Confirmed by Meryl Medeiros ([id number]) on 10/22/2018 3:47:32 PM         Important Labs:  No results found for: FOL, RBCF  Lab Results   Component Value Date/Time    Cholesterol, total 133 10/22/2018 01:55 PM    Cholesterol, total 122 10/22/2018 01:55 PM    HDL Cholesterol 44 10/22/2018 01:55 PM    HDL Cholesterol 41 10/22/2018 01:55 PM    LDL, calculated 52 10/22/2018 01:55 PM    LDL, calculated 47.4 10/22/2018 01:55 PM    VLDL, calculated 37 10/22/2018 01:55 PM    VLDL, calculated 33.6 10/22/2018 01:55 PM    Triglyceride 185 (H) 10/22/2018 01:55 PM    Triglyceride 168 (H) 10/22/2018 01:55 PM    CHOL/HDL Ratio 3.0 10/22/2018 01:55 PM    CHOL/HDL Ratio 3.0 10/22/2018 01:55 PM     Lab Results   Component Value Date/Time    TSH 1.40 10/22/2018 01:55 PM    Triiodothyronine (T3), free 2.4 10/22/2018 01:55 PM    T4, Free 1.0 10/22/2018 01:55 PM     No results found for: DS35, PHEN, PHENO, PHENT, DILF, DS39, PHENY, PTN, VALF2, VALAC, VALP, VALPR, DS6, CRBAM, CRBAMP, CARB2, XCRBAM  Discussed with: patient    Allergies: No Known Allergies   Review of Systems:    Y  N   Constitutional: [] [x] recent weight change  [] [x] fever  [x] [] sleep difficulties   ENT/Mouth:  [x] [] hearing loss  [] [x] swallowing problems  [x] [] slurred speech   Cardiovascular:  [] [x] chest pain   [] [x] palpitations    Respiratory: [] [x] cough with swallow  [] [x] shortness of breath  [x] [] sleep apnea  [] [] intubated   Gastrointestinal: [] [x] abdominal pain  [] [x] nausea   Genitourinary: [x] [] frequent urination  [] [] incontinence    Musculoskeletal:   [] [x] joint pain  [] [x] muscle pain   Integument:   [] [] rash/itching   Neurological:  [] [] dizziness/vertigo  [] [] sedation  [] [x] confusion  [] [x] agitation/combativeness  [] [] loss of consciousness  [] [x] numbness/tingling sensation  [] [x] tremors  [] [x] weakness in limbs  [] [x] difficulty with balance  [] [x] frequent or recurring headaches  [] [x] memory loss   [] [] comatose  [] [x] seizures   Psychiatric:   [] [x] depression  [] [] hallucinations   Endocrine: [] [] excessive thirst or urination   [] [] heat or cold intolerance   Hematologic/Lymphatic: [] [] bleeding tendency  [] [] enlarged lymph nodes     PMH:   Past Medical History:   Diagnosis Date    [facility]     Other and Unspecified    Anxiety state     Arthritis     BPH with obstruction/lower urinary tract symptoms     Carpal tunnel syndrome     Chronic kidney disease, stage III (moderate) (HCC)     Dyslipidemia     Elevated PSA 2011    Esophageal reflux     Essential hypertension, benign     Exercise tolerance finding July 2015    GERD (gastroesophageal reflux disease)     Gouty arthropathy     History of blood transfusion     Hypopotassemia     Hyposmolality and/or hyponatremia     Irritable bowel syndrome     Knee pain, left     Lumbago     Lumbar back pain     S/P TKR (total knee replacement) 2013    Scoliosis deformity of spine     Sleep apnea         Problem List: Principal Problem:    Stroke [facility]cerebrum[facility] (10/22/2018)    Active Problems:    BPH (benign prostatic hyperplasia) (11/18/2013)      Essential hypertension, benign (11/18/2013)      TIA (transient ischemic attack) (10/22/2018)        FH:   Family History   Problem Relation Age of Onset    Hypertension Father     Diabetes Mother         SH:   Social History     Socioeconomic History    Marital status:      Spouse name: Not on file    Number of children: Not on file    Years of education: Not on file    Highest education level: Not on file   Social Needs    Financial resource strain: Not on file    Food insecurity - worry: Not on file    Food insecurity - inability: Not on file    Transportation needs - medical: Not on file   [place] needs - non-medical: Not on file   Occupational History    Not on file   Tobacco Use    Smoking status: Former Smoker    Smokeless tobacco: Never Used   Substance and Sexual Activity    Alcohol use: Yes     Alcohol/week: 1.8 oz     Types: 3 Cans of beer per week    Drug use: No    Sexual activity: Not on file   Other Topics Concern    Not on file   Social History Narrative    Not on file          Vital Signs:   Visit Vitals  /81 (BP 1 Location: Right arm)   Pulse 82   Temp 97.8 °F (36.6 °C)   Resp 16   Ht 5' 7\" (1.702 m)   Wt 81.6 kg (180 lb)   SpO2 94%   BMI 28.19 kg/m²      Neurological examination:    Appearance: In no acute distress, well developed, well nourished, cooperative   Cardiovascular: Heart is regular rate and rhythm, peripheral edema is absent. No carotid bruits heard   Mental status examination: Alert and oriented X 4. Normal speech and language. Normal attention, memory and fund of knowledge.  Cranial Nerves:     I: smell Not tested   II: visual fields Visual fields were intact to confrontation to LLQ and OD. Eye movements were full and conjugate, saccades were accurate, pursuit movements were smooth, and there was no nystagmus. Blurred to OS   II: pupils Equal, round, reactive to light on right. Left irregular and non reactive. III,VII: ptosis none   III,IV,VI: extraocular muscles  Full ROM   V: facial light touch sensation  normal   V,VII: corneal reflex     VII: facial muscle function  Left naso labial fold flattening   VIII: hearing    IX: soft palate elevation  Normal. The palate and tongue moved in the midline.      IX,X: gag reflex present   XI: sternocleidomastoid strength 5/5 XII: tongue strength  Normal.          Motor exam: Station, gait:  normal. Muscle tone, bulk and manual muscle testing: normal. Spacticity absent.  Sensory: Intact to primary modalities.  Coordination: Normal rapid alternating movements, finger to nose testing.  Reflexes: Symmetric and 2+ in bilateral biceps, triceps,  Brachioradialis  Patellar (right only), ankle reflexes. Left patellar absent. Plantar reflexes are mute.            Medications:      [x] REVIEWED  Current Facility-Administered Medications   Medication    0.9% sodium chloride infusion    clopidogrel (PLAVIX) tablet 75 mg    atorvastatin (LIPITOR) tablet 40 mg    ondansetron (ZOFRAN) injection 2 mg    aspirin tablet 325 mg    atorvastatin (LIPITOR) tablet 80 mg    acetaminophen (TYLENOL) tablet 650 mg    acetaminophen (TYLENOL) suppository 650 mg    senna-docusate (PERICOLACE) 8.6-50 mg per tablet 2 Tab    albuterol (PROVENTIL VENTOLIN) nebulizer solution 2.5 mg    folic acid (FOLVITE) 1 mg, thiamine (B-1) 100 mg in 0.9% sodium chloride 50 mL ivpb    heparin (porcine) injection 5,000 Units     Data:      [x] REVIEWED  Recent Results (from the past 24 hour(s))   GLUCOSE, POC    Collection Time: 10/22/18  1:44 PM   Result Value Ref Range    Glucose (POC) 122 (H) 70 - 110 mg/dL   TYPE & SCREEN    Collection Time: 10/22/18  1:45 PM   Result Value Ref Range    Crossmatch Expiration 10/25/2018     ABO/Rh(D) A POSITIVE     Antibody screen POS     Antibody ID [id number])     Unit number [id number]     Blood component type [facility]     Unit division 00     Status of unit ALLOCATED     ANTIGEN/ANTIBODY INFO FY(A) NEGATIVE,     Crossmatch result Compatible     Unit number [id number]     Blood component type [facility]     Unit division 00     Status of unit ALLOCATED     ANTIGEN/ANTIBODY INFO FY(A) NEGATIVE,     Crossmatch result Compatible    CBC W/O DIFF    Collection Time: 10/22/18  1:55 PM   Result Value Ref Range    WBC 5.4 4.6 - 13.2 K/uL    RBC 4.41 (L) 4.70 - 5.50 M/uL    HGB 14.1 13.0 - 16.0 g/dL    HCT 40.8 36.0 - 48.0 %    MCV 92.5 74.0 - 97.0 FL    MCH 32.0 24.0 - 34.0 PG    MCHC 34.6 31.0 - 37.0 g/dL    RDW 13.5 11.6 - 14.5 %    PLATELET 086 909 - 336 K/uL    MPV 10.0 9.2 - 64.5 FL   METABOLIC PANEL, COMPREHENSIVE    Collection Time: 10/22/18  1:55 PM   Result Value Ref Range    Sodium 143 136 - 145 mmol/L    Potassium 3.6 3.5 - 5.5 mmol/L    Chloride 107 100 - 108 mmol/L    CO2 30 21 - 32 mmol/L    Anion gap 6 3.0 - 18 mmol/L    Glucose 125 (H) 74 - 99 mg/dL    BUN 14 7.0 - 18 MG/DL    Creatinine 1.41 (H) 0.6 - 1.3 MG/DL    BUN/Creatinine ratio 10 (L) 12 - 20      GFR est AA 58 (L) >60 ml/min/1.73m2    GFR est non-AA 48 (L) >60 ml/min/1.73m2    Calcium 8.7 8.5 - 10.1 MG/DL    Bilirubin, total 0.6 0.2 - 1.0 MG/DL    ALT (SGPT) 30 16 - 61 U/L    AST (SGOT) 17 15 - 37 U/L    Alk.  phosphatase 85 45 - 117 U/L    Protein, total 6.3 (L) 6.4 - 8.2 g/dL    Albumin 3.6 3.4 - 5.0 g/dL    Globulin 2.7 2.0 - 4.0 g/dL    A-G Ratio 1.3 0.8 - 1.7     PROTHROMBIN TIME + INR    Collection Time: 10/22/18  1:55 PM   Result Value Ref Range    Prothrombin time 13.0 11.5 - 15.2 sec    INR 1.0 0.8 - 1.2     THROMBIN TIME    Collection Time: 10/22/18  1:55 PM   Result Value Ref Range    Thrombin time 16.5 13.8 - 18.2 SECS   FIBRINOGEN    Collection Time: 10/22/18  1:55 PM   Result Value Ref Range    Fibrinogen 332 210 - 451 mg/dL   ASPIRIN TEST    Collection Time: 10/22/18  1:55 PM   Result Value Ref Range    Aspirin test 575 (L) 620 - 672 ARU   HEMOGLOBIN A1C W/O EAG    Collection Time: 10/22/18  1:55 PM   Result Value Ref Range    Hemoglobin A1c 5.6 4.2 - 5.6 %   LIPID PANEL    Collection Time: 10/22/18  1:55 PM   Result Value Ref Range    LIPID PROFILE          Cholesterol, total 133 <200 MG/DL    Triglyceride 185 (H) <150 MG/DL    HDL Cholesterol 44 40 - 60 MG/DL    LDL, calculated 52 0 - 100 MG/DL    VLDL, calculated 37 MG/DL    CHOL/HDL Ratio 3.0 0 - 5.0 TROPONIN I    Collection Time: 10/22/18  1:55 PM   Result Value Ref Range    Troponin-I, Qt. 0.02 0.0 - 0.045 NG/ML   LIPID PANEL    Collection Time: 10/22/18  1:55 PM   Result Value Ref Range    LIPID PROFILE          Cholesterol, total 122 <200 MG/DL    Triglyceride 168 (H) <150 MG/DL    HDL Cholesterol 41 40 - 60 MG/DL    LDL, calculated 47.4 0 - 100 MG/DL    VLDL, calculated 33.6 MG/DL    CHOL/HDL Ratio 3.0 0 - 5.0     SED RATE (ESR)    Collection Time: 10/22/18  1:55 PM   Result Value Ref Range    Sed rate, automated 5 0 - 20 mm/hr   CRP, HIGH SENSITIVITY    Collection Time: 10/22/18  1:55 PM   Result Value Ref Range    C-Reactive Protein, Cardiac 6.01 (H) 0.00 - 3.00 mg/L   TSH 3RD GENERATION    Collection Time: 10/22/18  1:55 PM   Result Value Ref Range    TSH 1.40 0.36 - 3.74 uIU/mL   T3, FREE    Collection Time: 10/22/18  1:55 PM   Result Value Ref Range    Triiodothyronine (T3), free 2.4 2.18 - 3.98 PG/ML   T4, FREE    Collection Time: 10/22/18  1:55 PM   Result Value Ref Range    T4, Free 1.0 0.7 - 1.5 NG/DL   EKG, 12 LEAD, INITIAL    Collection Time: 10/22/18  2:21 PM   Result Value Ref Range    Ventricular Rate 58 BPM    Atrial Rate 58 BPM    P-R Interval 164 ms    QRS Duration 102 ms    Q-T Interval 414 ms    QTC Calculation (Bezet) 406 ms    Calculated P Axis -4 degrees    Calculated R Axis -3 degrees    Calculated T Axis 41 degrees    Diagnosis       Sinus bradycardia with occasional premature ventricular complexes  Otherwise normal ECG  When compared with ECG of 05-DEC-2017 15:30,  premature ventricular complexes are now present  Confirmed by Elonda Plant ([id number]) on 10/22/2018 3:47:32 PM     URINALYSIS W/ RFLX MICROSCOPIC    Collection Time: 10/22/18  2:45 PM   Result Value Ref Range    Color YELLOW      Appearance CLEAR      Specific gravity 1.014 1.005 - 1.030      pH (UA) 6.5 5.0 - 8.0      Protein NEGATIVE  NEG mg/dL    Glucose NEGATIVE  NEG mg/dL    Ketone NEGATIVE  NEG mg/dL    Bilirubin NEGATIVE  NEG      Blood NEGATIVE  NEG      Urobilinogen 0.2 0.2 - 1.0 EU/dL    Nitrites NEGATIVE  NEG      Leukocyte Esterase NEGATIVE  NEG     DRUG SCREEN, URINE    Collection Time: 10/22/18  2:45 PM   Result Value Ref Range    BENZODIAZEPINES NEGATIVE  NEG      BARBITURATES NEGATIVE  NEG      THC (TH-CANNABINOL) NEGATIVE  NEG      OPIATES NEGATIVE  NEG      PCP(PHENCYCLIDINE) NEGATIVE  NEG      COCAINE NEGATIVE  NEG      AMPHETAMINES NEGATIVE  NEG      METHADONE NEGATIVE  NEG      HDSCOM (NOTE)    GLUCOSE, POC    Collection Time: 10/22/18  6:26 PM   Result Value Ref Range    Glucose (POC) 140 (H) 70 - 110 mg/dL   GLUCOSE, POC    Collection Time: 10/22/18 10:40 PM   Result Value Ref Range    Glucose (POC) 122 (H) 70 - 110 mg/dL   GLUCOSE, POC    Collection Time: 10/23/18  8:00 AM   Result Value Ref Range    Glucose (POC) 120 (H) 70 - 110 mg/dL   CBC W/O DIFF    Collection Time: 10/23/18  8:20 AM   Result Value Ref Range    WBC 5.2 4.6 - 13.2 K/uL    RBC 4.35 (L) 4.70 - 5.50 M/uL    HGB 13.7 13.0 - 16.0 g/dL    HCT 39.9 36.0 - 48.0 %    MCV 91.7 74.0 - 97.0 FL    MCH 31.5 24.0 - 34.0 PG    MCHC 34.3 31.0 - 37.0 g/dL    RDW 13.2 11.6 - 14.5 %    PLATELET 558 075 - 892 K/uL    MPV 10.5 9.2 - 71.4 FL   METABOLIC PANEL, BASIC    Collection Time: 10/23/18  8:20 AM   Result Value Ref Range    Sodium 142 136 - 145 mmol/L    Potassium 3.0 (L) 3.5 - 5.5 mmol/L    Chloride 107 100 - 108 mmol/L    CO2 28 21 - 32 mmol/L    Anion gap 7 3.0 - 18 mmol/L    Glucose 102 (H) 74 - 99 mg/dL    BUN 15 7.0 - 18 MG/DL    Creatinine 1.22 0.6 - 1.3 MG/DL    BUN/Creatinine ratio 12 12 - 20      GFR est AA >60 >60 ml/min/1.73m2    GFR est non-AA 57 (L) >60 ml/min/1.73m2    Calcium 8.4 (L) 8.5 - 10.1 MG/DL       Karen Ambriz, NP

## 2018-10-23 NOTE — PROGRESS NOTES
Problem: Self Care Deficits Care Plan (Adult)  Goal: *Acute Goals and Plan of Care (Insert Text)  Occupational Therapy EVALUATION/discharge    Patient: Queen Ankit (81 y.o. male)  Date: 10/23/2018  Primary Diagnosis: TIA (transient ischemic attack)       Precautions:   Fall    ASSESSMENT AND RECOMMENDATIONS:  Based on the objective data described below, the patient presents to be independent with ADLs and transfers. Pt participated with bed mobility, toilet transfer and LB dressing this session. Pt did not require any AE for ambulation and demo no LOB this session. Skilled occupational therapy is not indicated at this time. Discharge Recommendations: None  Further Equipment Recommendations for Discharge: N/A      Barriers to Learning/Limitations: None  Compensate with: visual, verbal, tactile, kinesthetic cues/model     COMPLEXITY     Eval Complexity: History: LOW Complexity : Brief history review ; Examination: LOW Complexity : 1-3 performance deficits relating to physical, cognitive , or psychosocial skils that result in activity limitations and / or participation restrictions ; Decision Making:LOW Complexity : No comorbidities that affect functional and no verbal or physical assistance needed to complete eval tasks  Assessment: Low Complexity        G-CODES:     Self Care  Current  CH= 0%   D/C  CH= 0%. The severity rating is based on the Level of Assistance required for Functional Mobility and ADLs. SUBJECTIVE:   Patient stated I never used a walker or cane to walk.     OBJECTIVE DATA SUMMARY:     Past Medical History:   Diagnosis Date    Alcohol dependence, episodic drinking behavior ([facility] [address]     Other and Unspecified    Anxiety state     Arthritis     BPH with obstruction/lower urinary tract symptoms     Carpal tunnel syndrome     Chronic kidney disease, stage III (moderate) (HCC)     Dyslipidemia     Elevated PSA 2011    Esophageal reflux     Essential hypertension, benign     Exercise tolerance finding July 2015    GERD (gastroesophageal reflux disease)     Gouty arthropathy     History of blood transfusion     Hypopotassemia     Hyposmolality and/or hyponatremia     Irritable bowel syndrome     Knee pain, left     Lumbago     Lumbar back pain     S/P TKR (total knee replacement) 2013    Scoliosis deformity of spine     Sleep apnea      Past Surgical History:   Procedure Laterality Date    HX CHOLECYSTECTOMY      HX HERNIA REPAIR      HX OTHER SURGICAL  07.31.2015    HX ADJACENT TISSUE TRANSFER/REARRANGEMENT     Prior Level of Function/Home Situation:   Home Situation  Home Environment: Apartment  # Steps to Enter: (has elevators to get ot 2nd floor)  One/Two Story Residence: One story  Living Alone: Yes  Support Systems: Child(jagdeep)  Patient Expects to be Discharged to[de-identified] Apartment  Current DME Used/Available at Home: None  Tub or Shower Type: Tub/Shower combination  [x]     Right hand dominant   []     Left hand dominant  Cognitive/Behavioral Status:  Neurologic State: Alert  Orientation Level: Oriented X4  Cognition: Appropriate for age attention/concentration; Follows commands  Safety/Judgement: Fall prevention    Skin: intact    Edema: none    Vision/Perceptual:    Tracking: Able to track stimulus in all quadrants w/o difficulty      Coordination:  Coordination: Within functional limits  Fine Motor Skills-Upper: Left Intact; Right Intact    Gross Motor Skills-Upper: Left Intact; Right Intact    Balance:  Sitting: Intact  Standing: Intact    Strength:  Strength:  Within functional limits    Tone & Sensation:  Sensation: Intact    Range of Motion:  AROM: Within functional limits    Functional Mobility and Transfers for ADLs:  Bed Mobility:  Supine to Sit: Independent  Sit to Supine: Independent  Transfers:  Sit to Stand: Independent   Toilet Transfer : Independent     ADL Assessment:  Feeding: Independent    Oral Facial Hygiene/Grooming: Independent    Bathing: Independent    Upper Body Dressing: Independent    Lower Body Dressing: Independent    Toileting: Independent    ADL Intervention:    Lower Body Dressing Assistance  Dressing Assistance: Independent  Socks: Independent  Leg Crossed Method Used: Yes  Position Performed: Seated edge of bed  Cues: Don;Doff    Toileting  Toileting Assistance: Independent  Bladder Hygiene: Independent  Clothing Management: Independent    Cognitive Retraining  Safety/Judgement: Fall prevention    Therapeutic Exercise:      Pain:  Pt reports 0/10 pain or discomfort prior to treatment.    Pt reports 0/10 pain or discomfort post treatment. Activity Tolerance:   Good     Please refer to the flowsheet for vital signs taken during this treatment. After treatment:   [x]  Patient left in no apparent distress sitting up at EOB  []  Patient left in no apparent distress in bed  [x]  Call bell left within reach  []  Nursing notified  []  Caregiver present  []  Bed alarm activated    COMMUNICATION/EDUCATION:   Communication/Collaboration:  [x]      Home safety education was provided and the patient/caregiver indicated understanding. [x]      Patient/family have participated as able and agree with findings and recommendations. []      Patient is unable to participate in plan of care at this time.     Kaylynn Nichole OTR/L  Time Calculation: 11 mins

## 2018-10-23 NOTE — PROGRESS NOTES
Problem: Mobility Impaired (Adult and Pediatric)  Goal: *Acute Goals and Plan of Care (Insert Text)  Physical Therapy Goals   Initiated 10/23/2018 and to be accomplished within 7 days. 1.  Patient will complete all bed mobility with independence in order to prepare for EOB/OOB activity. 2.  Patient will perform sit <> stand with independence in order to prepare for OOB/gait activity. 3.  Patient will perform bed to chair transfers with independence in order to promote mobility and encourage seated activity to progress towards their prior level of function. 4.  Patient will ambulate 300 feet with independence using LRAD in order to prepare for safe negotiation of their environment. Outcome: Progressing Towards Goal  PHYSICAL THERAPY: Initial Assessment   INPATIENT: Medicare: Hospital Day: 2     Patient: Mahi Newsome (62 y.o. male)    Date: 10/23/2018  Primary Diagnosis: TIA (transient ischemic attack)   ,     Precautions:         PLOF:Independent     ASSESSMENT :  Patient supine in bed, agreeable to participation with PT. Patient reports that he lives alone in a 2nd floor apartment with elevator access. Ambulated without an AD PTA. Supervision for supine <> sit, sit <> stand, and ambulation x 65 ft without AD. No c/o of dizziness and no numerological signs noted with mobility. Gait steady overall. Patient returned to bed and left supine with all needs within reach. No c/o pain. RN Bia Vazquez made aware of session and patient's increase in BP. BP at EOB:  180/94  BP EOB after gait: 207/116  BP supine in bed: 198/112    Recommend d/c home likely without need for skilled PT. Plan to see for 1 - 2 more visits to maximize safety with mobility. Patient presents with deficits in:  Bed Mobility, Transfers, Gait, Strength and Stairs    Patient will benefit from skilled intervention to address the above impairments.   Patients rehabilitation potential is considered to be Good  Factors which may influence rehabilitation potential include:   []         None noted  []         Mental ability/status  [x]         Medical condition  []         Home/family situation and support systems  []         Safety awareness  []         Pain tolerance/management  []         Other:      PLAN :  Recommendations and Planned Interventions:  [x]           Bed Mobility Training             [x]    Neuromuscular Re-Education  [x]           Transfer Training                   []    Orthotic/Prosthetic Training  [x]           Gait Training                          []    Modalities  [x]           Therapeutic Exercises          []    Edema Management/Control  [x]           Therapeutic Activities            [x]    Patient and Family Training/Education  []           Other (comment):      EDUCATION:   Education:  Patient was educated on the following topics: Purpose of PT, PT POC, safety with mobility. Verbalizes understanding. Barriers to Learning/Limitations: None  Compensate with: visual, verbal, tactile, kinesthetic cues/model    Recommendations for the next treatment session: Gait  Frequency/Duration: Patient will be followed by physical therapy 1 - 2 more visits to address goals. Discharge Recommendations: None  Further Equipment Recommendations for Discharge: N/A  Factors which may impact discharge planning: N/A     SUBJECTIVE:   Patient stated I feel a lot better.     OBJECTIVE DATA SUMMARY:     Past Medical History:   Diagnosis Date    Alcohol dependence, episodic drinking behavior ([facility] [address]     Other and Unspecified    Anxiety state     Arthritis     BPH with obstruction/lower urinary tract symptoms     Carpal tunnel syndrome     Chronic kidney disease, stage III (moderate) (HCC)     Dyslipidemia     Elevated PSA 2011    Esophageal reflux     Essential hypertension, benign     Exercise tolerance finding July 2015    GERD (gastroesophageal reflux disease)     Gouty arthropathy     History of blood transfusion     Hypopotassemia     Hyposmolality and/or hyponatremia     Irritable bowel syndrome     Knee pain, left     Lumbago     Lumbar back pain     S/P TKR (total knee replacement) 2013    Scoliosis deformity of spine     Sleep apnea      Past Surgical History:   Procedure Laterality Date    HX CHOLECYSTECTOMY      HX HERNIA REPAIR      HX OTHER SURGICAL  07.31.2015    HX ADJACENT TISSUE TRANSFER/REARRANGEMENT         Eval Complexity: History: MEDIUM  Complexity : 1-2 comorbidities / personal factors will impact the outcome/ POC Exam:MEDIUM Complexity : 3 Standardized tests and measures addressing body structure, function, activity limitation and / or participation in recreation  Presentation: LOW Complexity : Stable, uncomplicated  Clinical Decision Making:Low Complexity Supervision for mobility Overall Complexity:LOW     G CODES:Mobility  Current  CI= 1-19%   Goal  CI= 1-19%. The severity rating is based on the Other Clinical Judgement: Superivision for mobility     Prior Level of Function/Home Situation:   Home Situation  Home Environment: Apartment  # Steps to Enter: 0  One/Two Story Residence: One story  Living Alone: Yes  Support Systems: Friends \ neighbors  Patient Expects to be Discharged to[de-identified] Apartment  Current DME Used/Available at Home: None  Critical Behavior:  Neurologic State: Alert  Orientation Level: Oriented X4  Cognition: Follows commands  Safety/Judgement: Fall prevention; Awareness of environment  Psychosocial  Patient Behaviors: Calm; Cooperative  Purposeful Interaction: Yes    Manual Muscle Testing (LE)         R     L    Hip Flexion:   4+/5  4+/5  Knee EXT:   4+/5  4+/5  Knee FLEX:   4+/5  4+/5  Ankle DF:   4+/5  4+/5  _________________________________________________   Tone : normal  Sensation: NT  Range Of Motion: WFL    Functional Mobility:      Functional Status      Indep   (I)   Mod I   Super-vision   Min A   Mod A   Max A   Total A   Assist x2 Verbal cues Additional time Not tested   Comments   Rolling []  []  [] []    []    []  []  [] [] [] [x]    Supine to sit []  []  [x] []  []  []  []  [] [] [] []    Sit to supine []  []  [x] []  []  []  []  [] [] [] []    Sit to stand []  []  [x] []  []  []  []  [] [] [] []    Stand to sit []  []  [x] []  []  []  []  [] [] [] []    Bed to chair transfers []  []  [] []  []  []  []  [] [] [] [x]        Balance    Good   Fair   Poor   Unable   Not tested   Comments   Sitting static [x]  []  []  []  []    Sitting dynamic [x]  []  []  []  []    Standing static [x]  []  []  []  []    Standing dynamic [x]  []  []  []  []        Mobility/Gait:   Level of Assistance: Supervision  Assistive Device: None  Distance Ambulated: 75 feet     Left Lower Extremity: FWB  Right Lower Extremity: FWB  Base of Support: WFL  Speed/Yuliana: WFL  Step Length: WFL  Swing Pattern: WFL  Stance: WFL  Gait Abnormalities: altered arm swing    Pain:  None    Vital Signs  Temp: 97.8 °F (36.6 °C)     Pulse (Heart Rate): 82     BP: 168/81     Resp Rate: 16     O2 Sat (%): 94 %    Activity Tolerance:   Good    Please refer to the flowsheet for vital signs taken during this treatment. After treatment:   []         Patient left in no apparent distress sitting up in chair  [x]         Patient left in no apparent distress in bed  [x]         Call bell left within reach  [x]         Nursing notified  []         Caregiver present  []         Bed alarm activated    COMMUNICATION/EDUCATION:   [x]         Fall prevention education was provided and the patient/caregiver indicated understanding. [x]         Patient/family have participated as able in goal setting and plan of care. [x]         Patient/family agree to work toward stated goals and plan of care. []         Patient understands intent and goals of therapy, but is neutral about his/her participation. []         Patient is unable to participate in goal setting and plan of care.     Thank you for this referral.  Ariana Valle Time Calculation: 23 mins

## 2018-10-23 NOTE — PROGRESS NOTES
Assumed care of pt from [facility] pt awake in bed A&O x 4 , no distress noted and denies pain. Frequently used items and call bell within reach. Patient verbalized understanding to use call bell for any needs or assistance. Bed locked in lowest position. Dual NIH performed.

## 2018-10-23 NOTE — PROGRESS NOTES
Reason for Admission:   TIA                  RRAT Score:  16                Do you (patient/family) have any concerns for transition/discharge? None @ this time. Plan for utilizing home health:  Not @ this time, pt not homebound        Likelihood of readmission? Mod/yellow            Transition of Care Plan:    Home with out-pt follow up when medically stable. Chart reviewed. Met with pt., verified all demographics. States has MCR/BC ins. States Dr. Geraldine Kapoor is his PCP, last seen approx June, 2018. NOK: Yadira Phillips, son. Lives alone. Uses no DME. Independent with ADL's prior to admit, still drives. Will cont to follow. Kiera NarvaezRN,ext [phone]. Patient has designated his son or dtr-n-law to participate in his/her discharge plan and to receive any needed information. Name:  Yadira Phillips  Address:  Phone number: [phone]    Care Management Interventions  PCP Verified by CM: Yes(Dr. Geraldine Kapoor, last seen June 2018)  Palliative Care Criteria Met (RRAT>21 & CHF Dx)?: No  Mode of Transport at Discharge:  Other (see comment)(family)  Transition of Care Consult (CM Consult): Discharge Planning  Discharge Durable Medical Equipment: No  Physical Therapy Consult: Yes  Occupational Therapy Consult: Yes  Speech Therapy Consult: Yes  Current Support Network: Lives Alone  Confirm Follow Up Transport: Self  Plan discussed with Pt/Family/Caregiver: Yes  Discharge Location  Discharge Placement: Home

## 2018-10-23 NOTE — PROGRESS NOTES
Problem: Falls - Risk of  Goal: *Absence of Falls  Document Kinza Fall Risk and appropriate interventions in the flowsheet.   Outcome: Progressing Towards Goal  Fall Risk Interventions:            Medication Interventions: Teach patient to arise slowly

## 2018-10-24 ENCOUNTER — APPOINTMENT (OUTPATIENT)
Dept: NON INVASIVE DIAGNOSTICS | Age: 83
DRG: 066 | End: 2018-10-24
Attending: NURSE PRACTITIONER
Payer: MEDICARE

## 2018-10-24 LAB
ANION GAP SERPL CALC-SCNC: 8 MMOL/L (ref 3–18)
APPEARANCE UR: CLEAR
BACTERIA URNS QL MICRO: NEGATIVE /HPF
BILIRUB UR QL: NEGATIVE
BUN SERPL-MCNC: 10 MG/DL (ref 7–18)
BUN/CREAT SERPL: 9 (ref 12–20)
CALCIUM SERPL-MCNC: 8.5 MG/DL (ref 8.5–10.1)
CHLORIDE SERPL-SCNC: 106 MMOL/L (ref 100–108)
CO2 SERPL-SCNC: 30 MMOL/L (ref 21–32)
COLOR UR: YELLOW
CREAT SERPL-MCNC: 1.16 MG/DL (ref 0.6–1.3)
EPITH CASTS URNS QL MICRO: ABNORMAL /LPF (ref 0–5)
ERYTHROCYTE [DISTWIDTH] IN BLOOD BY AUTOMATED COUNT: 13.2 % (ref 11.6–14.5)
GLUCOSE BLD STRIP.AUTO-MCNC: 122 MG/DL (ref 70–110)
GLUCOSE BLD STRIP.AUTO-MCNC: 130 MG/DL (ref 70–110)
GLUCOSE SERPL-MCNC: 104 MG/DL (ref 74–99)
GLUCOSE UR STRIP.AUTO-MCNC: NEGATIVE MG/DL
HCT VFR BLD AUTO: 42.4 % (ref 36–48)
HGB BLD-MCNC: 14.7 G/DL (ref 13–16)
HGB UR QL STRIP: NEGATIVE
KETONES UR QL STRIP.AUTO: ABNORMAL MG/DL
LEUKOCYTE ESTERASE UR QL STRIP.AUTO: NEGATIVE
MCH RBC QN AUTO: 31.6 PG (ref 24–34)
MCHC RBC AUTO-ENTMCNC: 34.7 G/DL (ref 31–37)
MCV RBC AUTO: 91.2 FL (ref 74–97)
NITRITE UR QL STRIP.AUTO: NEGATIVE
PH UR STRIP: 6.5 [PH] (ref 5–8)
PLATELET # BLD AUTO: 190 K/UL (ref 135–420)
PMV BLD AUTO: 10.7 FL (ref 9.2–11.8)
POTASSIUM SERPL-SCNC: 3.1 MMOL/L (ref 3.5–5.5)
PROT UR STRIP-MCNC: ABNORMAL MG/DL
RBC # BLD AUTO: 4.65 M/UL (ref 4.7–5.5)
RBC #/AREA URNS HPF: NEGATIVE /HPF (ref 0–5)
SODIUM SERPL-SCNC: 144 MMOL/L (ref 136–145)
SP GR UR REFRACTOMETRY: 1.01 (ref 1–1.03)
UROBILINOGEN UR QL STRIP.AUTO: 0.2 EU/DL (ref 0.2–1)
WBC # BLD AUTO: 6.3 K/UL (ref 4.6–13.2)
WBC URNS QL MICRO: ABNORMAL /HPF (ref 0–4)

## 2018-10-24 PROCEDURE — 85027 COMPLETE CBC AUTOMATED: CPT | Performed by: FAMILY MEDICINE

## 2018-10-24 PROCEDURE — 74011000258 HC RX REV CODE- 258: Performed by: NURSE PRACTITIONER

## 2018-10-24 PROCEDURE — 74011250636 HC RX REV CODE- 250/636: Performed by: FAMILY MEDICINE

## 2018-10-24 PROCEDURE — 74011250637 HC RX REV CODE- 250/637: Performed by: NURSE PRACTITIONER

## 2018-10-24 PROCEDURE — 82962 GLUCOSE BLOOD TEST: CPT

## 2018-10-24 PROCEDURE — 74011250637 HC RX REV CODE- 250/637: Performed by: HOSPITALIST

## 2018-10-24 PROCEDURE — 80048 BASIC METABOLIC PNL TOTAL CA: CPT | Performed by: FAMILY MEDICINE

## 2018-10-24 PROCEDURE — 81001 URINALYSIS AUTO W/SCOPE: CPT | Performed by: FAMILY MEDICINE

## 2018-10-24 PROCEDURE — 97116 GAIT TRAINING THERAPY: CPT

## 2018-10-24 PROCEDURE — 74011250637 HC RX REV CODE- 250/637: Performed by: EMERGENCY MEDICINE

## 2018-10-24 PROCEDURE — 36415 COLL VENOUS BLD VENIPUNCTURE: CPT | Performed by: FAMILY MEDICINE

## 2018-10-24 PROCEDURE — 65660000000 HC RM CCU STEPDOWN

## 2018-10-24 PROCEDURE — 74011000250 HC RX REV CODE- 250: Performed by: NURSE PRACTITIONER

## 2018-10-24 PROCEDURE — 51798 US URINE CAPACITY MEASURE: CPT

## 2018-10-24 PROCEDURE — 74011250637 HC RX REV CODE- 250/637: Performed by: FAMILY MEDICINE

## 2018-10-24 PROCEDURE — 74011250636 HC RX REV CODE- 250/636: Performed by: NURSE PRACTITIONER

## 2018-10-24 PROCEDURE — 93306 TTE W/DOPPLER COMPLETE: CPT

## 2018-10-24 RX ORDER — LORAZEPAM 2 MG/ML
2 INJECTION INTRAMUSCULAR
Status: DISCONTINUED | OUTPATIENT
Start: 2018-10-24 | End: 2018-10-25 | Stop reason: HOSPADM

## 2018-10-24 RX ORDER — LORAZEPAM 1 MG/1
TABLET ORAL
Status: DISPENSED
Start: 2018-10-24 | End: 2018-10-24

## 2018-10-24 RX ORDER — LORAZEPAM 1 MG/1
1 TABLET ORAL
Status: DISCONTINUED | OUTPATIENT
Start: 2018-10-24 | End: 2018-10-25 | Stop reason: HOSPADM

## 2018-10-24 RX ORDER — LORAZEPAM 2 MG/ML
3 INJECTION INTRAMUSCULAR
Status: DISCONTINUED | OUTPATIENT
Start: 2018-10-24 | End: 2018-10-25 | Stop reason: HOSPADM

## 2018-10-24 RX ORDER — LORAZEPAM 1 MG/1
1 TABLET ORAL
Status: COMPLETED | OUTPATIENT
Start: 2018-10-24 | End: 2018-10-24

## 2018-10-24 RX ORDER — FENOFIBRATE 48 MG/1
48 TABLET, COATED ORAL DAILY
Status: DISCONTINUED | OUTPATIENT
Start: 2018-10-24 | End: 2018-10-25 | Stop reason: HOSPADM

## 2018-10-24 RX ORDER — LORAZEPAM 1 MG/1
2 TABLET ORAL
Status: DISCONTINUED | OUTPATIENT
Start: 2018-10-24 | End: 2018-10-25 | Stop reason: HOSPADM

## 2018-10-24 RX ORDER — SODIUM CHLORIDE 9 MG/ML
500 INJECTION, SOLUTION INTRAVENOUS ONCE
Status: COMPLETED | OUTPATIENT
Start: 2018-10-24 | End: 2018-10-24

## 2018-10-24 RX ORDER — TAMSULOSIN HYDROCHLORIDE 0.4 MG/1
0.4 CAPSULE ORAL DAILY
Status: DISCONTINUED | OUTPATIENT
Start: 2018-10-24 | End: 2018-10-25 | Stop reason: HOSPADM

## 2018-10-24 RX ORDER — LORAZEPAM 2 MG/ML
1 INJECTION INTRAMUSCULAR
Status: DISCONTINUED | OUTPATIENT
Start: 2018-10-24 | End: 2018-10-25 | Stop reason: HOSPADM

## 2018-10-24 RX ADMIN — LORAZEPAM 1 MG: 1 TABLET ORAL at 23:13

## 2018-10-24 RX ADMIN — ATORVASTATIN CALCIUM 40 MG: 40 TABLET, FILM COATED ORAL at 21:55

## 2018-10-24 RX ADMIN — FENOFIBRATE 48 MG: 48 TABLET, FILM COATED ORAL at 09:53

## 2018-10-24 RX ADMIN — HEPARIN SODIUM 5000 UNITS: 5000 INJECTION INTRAVENOUS; SUBCUTANEOUS at 01:04

## 2018-10-24 RX ADMIN — HEPARIN SODIUM 5000 UNITS: 5000 INJECTION INTRAVENOUS; SUBCUTANEOUS at 18:29

## 2018-10-24 RX ADMIN — LORAZEPAM 1 MG: 1 TABLET ORAL at 01:03

## 2018-10-24 RX ADMIN — ASPIRIN 325 MG ORAL TABLET 325 MG: 325 PILL ORAL at 09:44

## 2018-10-24 RX ADMIN — ATORVASTATIN CALCIUM 40 MG: 40 TABLET, FILM COATED ORAL at 00:12

## 2018-10-24 RX ADMIN — TAMSULOSIN HYDROCHLORIDE 0.4 MG: 0.4 CAPSULE ORAL at 11:11

## 2018-10-24 RX ADMIN — LORAZEPAM 1 MG: 1 TABLET ORAL at 23:48

## 2018-10-24 RX ADMIN — HEPARIN SODIUM 5000 UNITS: 5000 INJECTION INTRAVENOUS; SUBCUTANEOUS at 09:44

## 2018-10-24 RX ADMIN — SENNOSIDES-DOCUSATE SODIUM TAB 8.6-50 MG 2 TABLET: 8.6-5 TAB at 21:56

## 2018-10-24 RX ADMIN — SODIUM CHLORIDE 500 ML: 900 INJECTION, SOLUTION INTRAVENOUS at 13:08

## 2018-10-24 RX ADMIN — CLOPIDOGREL BISULFATE 75 MG: 75 TABLET ORAL at 09:44

## 2018-10-24 RX ADMIN — FOLIC ACID: 5 INJECTION, SOLUTION INTRAMUSCULAR; INTRAVENOUS; SUBCUTANEOUS at 16:23

## 2018-10-24 NOTE — PROGRESS NOTES
Problem: Falls - Risk of  Goal: *Absence of Falls  Document Kinza Fall Risk and appropriate interventions in the flowsheet.   Fall Risk Interventions:            Medication Interventions: Patient to call before getting OOB

## 2018-10-24 NOTE — PROGRESS NOTES
Assumed care of pt from [facility] pt awake in bed A&O x 4 , no distress noted and denies pain. Frequently used items and call bell within reach. Patient verbalized understanding to use call bell for any needs or assistance. Bed locked in lowest position. Dual NIH performed. 0830 Pt complained of having to urinate frequently but nothing comes out sometimes when he gets up to use the restroom. MD paged and made and aware and stated to bladder and to place urinary cathter if greater than 400 ml in bladder and to straight cath if less than 400 ml.     0855 Pt was bladder scanned and had 290 ml retained in bladder. He was informed of straight cath and stated he doesn't want to be cath and if he could have a medication instead of a catheter. Will make MD aware. 0900 MD Paged    1011 Pt constantly calling to see the Doctor in regards to his bladder. He stated if he can't see the Doctor soon he will leave and go to [facility]. MD paged again. 1100 Orders received for Flomax 0.4 mg daily. 1111 Medication given patient satisfied. .     4390 MD made aware of pt having tachycardia when ambulating to the restroom. Orders received for a 500 ml bolus. RBV      1815 Pt complained of anxiety at nighttime. MD paged orders received for Ativan 1 mg as needed at bedtime.  RBV

## 2018-10-24 NOTE — PROGRESS NOTES
Neurology Progress Note    Admit Date: 10/22/2018  Length of Stay: 2  Primary Care: Jn Leavitt MD   Principle Problem: Cerebrovascular accident (CVA) due to embolism of left middle cerebral artery [facility])     Assessment:    AIS     Plan:    AIS:  ASA and Plavix for three weeks per POINT trial and the just ASA starting November 12th. Permissive HTN for next 48 hours and then gentle lowering with goal <130/80. LDL 47.4 but triglycerides 168. Atorvastatin in place. Nutrition consult. ECHO still pending. From distribution of stroke, may be cardioembolic or thrombo-embolic from LMCA. Has been SR with multifocal PVCs on telemetry. Would benefit from outpatient loop recorder to r/o PAFib. Apnea link study. This can be performed outpatient through [facility] as already has appointment. Ok to be DCD from neurology standpoint with above treatment. If ECHO positive, would need to wait couple of weeks before starting anticoagulant. Interim History:  CTA shows irregularity of LMCA, mod-severe stenosis of LP1-P2, and mod stenosis of LPCOM. Complaints of frequent urination without complete emptying. History: John Edmondson is an 81yo  male with PMH of HTN, CKD, DLD, BPH, GERD, gout, arthritis, and possible sleep apnea who presented with slurred speech and right facial droop. He called EMS and soon after arriving to the ED his speech went back to baseline. He denies any extremity weakness or word finding issues during the event. Code S protocol was followed and he was seen by teleneurology. Alteplase was not given as he returned to baseline. He has an appointment at [facility] 10/30 for a sleep study. He states that he has has difficulty sleeping. He sleeps about 4-5 hours a night and is tired during the day. Unaware if he snores. 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       Results reviewed:     CT Results (most recent):  Results from Hospital Encounter encounter on 10/22/18   CTA HEAD NECK W CONT    Narrative EXAM: CTA HEAD AND NECK    INDICATION: Difficulty speaking, slurred speech, multifocal acute left  hemispheric infarct    COMPARISON: No prior studies    TECHNIQUE:  Multiple axial CT images of the neck were obtained extending from  the level of the aortic arch to the skull base after the administration of the  IV contrast utilizing a CTA protocol. Maximum intensity projection  reconstructions were performed in multiple planes. Multiple axial CT images of the head were obtained extending from below the  level of the skull base to the vertex after the administration of the IV  contrast utilizing a CTA protocol. Maximum intensity projection reconstructions  were performed in three planes. Additional 3 D reconstructions were performed  at a separate workstation. One or more dose reduction techniques were used on this CT: automated exposure  control, adjustment of the mAs and/or kVp according to patient's size, and  iterative reconstruction techniques. The specific techniques utilized on this CT  exam have been documented in the patient's electronic medical record.    ___________________    FINDINGS:    CTA NECK    Mild atherosclerotic calcification present along the aortic arch and the  proximal great vessels. Normal variant branching pattern is present with  aberrant right subclavian artery which has a retroesophageal course. The left common carotid is mildly irregular. The left carotid bifurcation is  mildly irregular. Estimated minimal luminal diameter proximal left ICA 3.8 mm. Estimated luminal diameter of normal left ICA cervical segment is 5.6 mm. Estimated degree of stenosis of the left ICA based upon NASCET criteria is 32%. Remainder of left ICA cervical segment is unremarkable. The right common carotid is mildly irregular. The right carotid bifurcation is  mildly irregular.  Estimated minimal luminal diameter proximal right ICA 5.6 mm. Estimated luminal diameter of normal right ICA cervical segment is 5.6 mm. Estimated degree of stenosis of the right ICA based upon NASCET criteria is 0%. Mild kinking is present in the distal right ICA without focal stenosis. Vertebral arteries are relatively equal in size. No high-grade vertebral artery  stenosis is seen, only slight irregularity. Degenerative changes are seen in the spine. Visualized lung apices are clear. The bilateral lenses are absent, likely due to prior cataract surgery. CTA HEAD    There is no evidence of aneurysm. Very mild irregularity is seen in the  bilateral carotid siphons without high grade stenosis. The carotid terminus is  unremarkable. A1 segments are relatively equal in size. There is mild multifocal narrowing  present in the left URBANO branches, particularly pericallosal. An anterior  communicating artery is present. There is significant irregularity and narrowing present involving the left MCA. Severe irregular stenosis of the distal left M1 segment is seen immediately  proximal to the bifurcation itself, well seen sagittal MIP image 104 and coronal  MIP image 88. Additional irregularity is seen more distally in left MCA branches  involving the superior and inferior divisions. Irregularity is also seen  involving the left anterior temporal artery origin. Very mild irregularity is present involving the right MCA bifurcation. No  high-grade right MCA stenosis is seen. The vertebral arteries are relatively equal in size and show mild  atherosclerotic irregularity intracranially. Laurance Sheriff PICA origins are unremarkable. The  basilar artery is unremarkable. Moderate to severe stenosis is present at the  left P1/P2 junction with mild irregularity seen more distally in both PCA. A  large caliber left-sided PCOM is present representing fetal origin type PCA;  moderate narrowing of this left-sided PCOM is seen posteriorly.   The dural venous sinuses are patent.     ___________________      Impression IMPRESSION:    1. No definite large vessel occlusion. Severe irregular left MCA stenosis along  the distal M1 segment immediately proximal to the bifurcation. This stenosis is  nonspecific perhaps atherosclerotic in origin; local dissection is possible  given that stenosis is quite irregular with slightly unusual morphology. 2. Additional sites of intracranial stenosis including moderate to severe left  P1/P2 junction, moderate left PCOM, mild bilateral PCA, and mild left URBANO. These  are nonspecific but presumably atherosclerotic in origin. 3. Mild proximal ICA irregularity without hemodynamically significant stenosis,  based on NASCET criteria. 4. No high-grade vertebral artery stenosis. 5. Normal variant aberrant right subclavian artery. MRI Results (most recent):  Results from [place] encounter on 10/22/18   MRI BRAIN WO CONT    Narrative Brain MR without contrast:    Indication: Difficulty speaking, slurred speech. Concern for hemorrhage or acute  infarction. Procedure: Sagittal spin echo T1, axial FSE FLAIR and T2 and axial diffusion  weighted scanning was performed. A susceptibility weighted (SWI) sequence was  performed, quite sensitive for hemosiderin, calcification and venous anatomy. Coronal FSE T2 images were also performed. No contrast was administered. Comparison exam: Head CT earlier today. Findings: Diffusion: There is a multifocal pattern of cortical and subcortical  restricted diffusion in the inferior left parietal lobe, the superior lateral  left frontal lobe including motor strip extending into the occipital lobe and  occipital periventricular white matter consistent with acute infarction. No  hemorrhage or mass effect. Correlating very low level increased signal FLAIR and  T2 with this acute infarction.     The susceptibility weighted scan shows no evidence of acute or chronic  hemorrhage or abnormal venous anatomy. No asymmetry of venous signal in the left  hemisphere. Brain parenchyma: Bilateral thalamic areas of chronic lacunar infarction with 2  lesions on the left and one on the right are noted. Small chronic lacunar  infarction left putamen/external capsule. Patchy ischemic changes in the  periventricular deep and mildly in the peripheral subcortical white matter of  both hemispheres are noted. Tiny chronic infarction superior right cerebellar  hemisphere. No mass or mass effect. Ventricles and sulci: Mild cortical atrophy most prominent at the vertex and  perisylvian region. Ventricular system within normal limits. Extra axial: No extra axial fluid collection or mass. Brain vasculature: Normal, no arterial vascular abnormality is noted. Craniocervical Junction: Normal.    Extracranial and skull base:  Bilateral cataract surgery. Mild mucosal  thickening in the paranasal sinuses. Impression Impression:    1. Multifocal pattern of acute infarction in the left hemisphere, primarily MCA  territory, likely embolic without hemorrhage or significant mass effect. 2. Bilateral thalamic and left basal ganglia chronic lacunar infarctions. Small  chronic infarction right cerebellar hemisphere. 3. Atrophy as described with additional white matter abnormality that is  nonspecific but likely ischemic.        Latest Hemoglobin A1C:  Lab Results   Component Value Date/Time    Hemoglobin A1c 5.6 10/22/2018 01:55 PM       Latest Cardiology Procedure:  Results for orders placed or performed during the hospital encounter of 10/22/18   EKG, 12 LEAD, INITIAL   Result Value Ref Range    Ventricular Rate 58 BPM    Atrial Rate 58 BPM    P-R Interval 164 ms    QRS Duration 102 ms    Q-T Interval 414 ms    QTC Calculation (Bezet) 406 ms    Calculated P Axis -4 degrees    Calculated R Axis -3 degrees    Calculated T Axis 41 degrees    Diagnosis       Sinus bradycardia with occasional premature ventricular complexes  Otherwise normal ECG  When compared with ECG of 05-DEC-2017 15:30,  premature ventricular complexes are now present  Confirmed by Jailene Chin ([id number]) on 10/22/2018 3:47:32 PM         Important Labs:  No results found for: FOL, RBCF  Lab Results   Component Value Date/Time    Cholesterol, total 133 10/22/2018 01:55 PM    Cholesterol, total 122 10/22/2018 01:55 PM    HDL Cholesterol 44 10/22/2018 01:55 PM    HDL Cholesterol 41 10/22/2018 01:55 PM    LDL, calculated 52 10/22/2018 01:55 PM    LDL, calculated 47.4 10/22/2018 01:55 PM    VLDL, calculated 37 10/22/2018 01:55 PM    VLDL, calculated 33.6 10/22/2018 01:55 PM    Triglyceride 185 (H) 10/22/2018 01:55 PM    Triglyceride 168 (H) 10/22/2018 01:55 PM    CHOL/HDL Ratio 3.0 10/22/2018 01:55 PM    CHOL/HDL Ratio 3.0 10/22/2018 01:55 PM     Lab Results   Component Value Date/Time    TSH 1.40 10/22/2018 01:55 PM    Triiodothyronine (T3), free 2.4 10/22/2018 01:55 PM    T4, Free 1.0 10/22/2018 01:55 PM     No results found for: DS35, PHEN, PHENO, PHENT, DILF, DS39, PHENY, PTN, VALF2, VALAC, VALP, VALPR, DS6, CRBAM, CRBAMP, CARB2, XCRBAM  Discussed with: patient and nurse    Allergies: No Known Allergies   Review of Systems:    Y  N   Constitutional: [] [x] recent weight change  [] [x] fever  [x] [] sleep difficulties   ENT/Mouth:  [x] [] hearing loss  [] [x] swallowing problems  [x] [] slurred speech   Cardiovascular:  [] [x] chest pain   [] [x] palpitations    Respiratory: [] [x] cough with swallow  [] [x] shortness of breath  [x] [] sleep apnea  [] [] intubated   Gastrointestinal: [] [x] abdominal pain  [] [x] nausea   Genitourinary: [x] [] frequent urination  [] [] incontinence    Musculoskeletal:   [] [x] joint pain  [] [x] muscle pain   Integument:   [] [] rash/itching   Neurological:  [] [] dizziness/vertigo  [] [] sedation  [] [x] confusion  [] [x] agitation/combativeness  [] [] loss of consciousness  [] [x] numbness/tingling sensation  [] [x] tremors  [] [x] weakness in limbs  [] [x] difficulty with balance  [] [x] frequent or recurring headaches  [] [x] memory loss   [] [] comatose  [] [x] seizures   Psychiatric:   [] [x] depression  [] [] hallucinations   Endocrine: [] [] excessive thirst or urination   [] [] heat or cold intolerance   Hematologic/Lymphatic: [] [] bleeding tendency  [] [] enlarged lymph nodes     PMH:   Past Medical History:   Diagnosis Date    Alcohol dependence, episodic drinking behavior ([facility]     Other and Unspecified    Anxiety state     Arthritis     BPH with obstruction/lower urinary tract symptoms     Carpal tunnel syndrome     Chronic kidney disease, stage III (moderate) (HCC)     Dyslipidemia     Elevated PSA 2011    Esophageal reflux     Essential hypertension, benign     Exercise tolerance finding July 2015    GERD (gastroesophageal reflux disease)     Gouty arthropathy     History of blood transfusion     Hypopotassemia     Hyposmolality and/or hyponatremia     Irritable bowel syndrome     Knee pain, left     Lumbago     Lumbar back pain     S/P TKR (total knee replacement) 2013    Scoliosis deformity of spine     Sleep apnea         Problem List: Principal Problem:    Cerebrovascular accident (CVA) due to embolism of left middle cerebral artery ([facility] (10/22/2018)    Active Problems:    BPH (benign prostatic hyperplasia) (11/18/2013)      Essential hypertension, benign (11/18/2013)      TIA (transient ischemic attack) (10/22/2018)        FH:   Family History   Problem Relation Age of Onset    Hypertension Father     Diabetes Mother         SH:   Social History     Socioeconomic History    Marital status:      Spouse name: Not on file    Number of children: Not on file    Years of education: Not on file    Highest education level: Not on file   Social Needs    Financial resource strain: Not on file    Food insecurity - worry: Not on file    Food insecurity - inability: Not on file  Transportation needs - medical: Not on file   [place] needs - non-medical: Not on file   Occupational History    Not on file   Tobacco Use    Smoking status: Former Smoker    Smokeless tobacco: Never Used   Substance and Sexual Activity    Alcohol use: Yes     Alcohol/week: 1.8 oz     Types: 3 Cans of beer per week    Drug use: No    Sexual activity: Not on file   Other Topics Concern    Not on file   Social History Narrative    Not on file          Vital Signs:   Visit Vitals  /89 (BP 1 Location: Left arm, BP Patient Position: At rest)   Pulse (!) 102   Temp 98 °F (36.7 °C)   Resp 15   Ht 5' 7\" (1.702 m)   Wt 81.6 kg (180 lb)   SpO2 95%   BMI 28.19 kg/m²      Neurological examination:    Appearance: In no acute distress, well developed, well nourished, cooperative   Cardiovascular: Heart is regular rate and rhythm, peripheral edema is absent.  Mental status examination: Alert and oriented X 4. Normal speech and language. Normal attention, memory and fund of knowledge.  Cranial Nerves:     I: smell Not tested   II: visual fields Visual fields were intact to confrontation to LLQ and OD. Eye movements were full and conjugate, saccades were accurate, pursuit movements were smooth, and there was no nystagmus. Blurred to OS   II: pupils Equal, round, reactive to light on right. Left irregular and non reactive. III,VII: ptosis none   III,IV,VI: extraocular muscles  Full ROM   V: facial light touch sensation  normal   V,VII: corneal reflex     VII: facial muscle function  Left naso labial fold flattening   VIII: hearing    IX: soft palate elevation  Normal. The palate and tongue moved in the midline. IX,X: gag reflex present   XI: sternocleidomastoid strength 5/5   XII: tongue strength  Normal.          Motor exam: Muscle tone, bulk and manual muscle testing: normal.    Sensory: Intact to primary modalities.              Medications:      [x] REVIEWED  Current Facility-Administered Medications   Medication    LORazepam (ATIVAN) 1 mg tablet    fenofibrate nanocrystallized (TRICOR) tablet 48 mg    0.9% sodium chloride infusion    clopidogrel (PLAVIX) tablet 75 mg    atorvastatin (LIPITOR) tablet 40 mg    ondansetron (ZOFRAN) injection 2 mg    aspirin tablet 325 mg    acetaminophen (TYLENOL) tablet 650 mg    acetaminophen (TYLENOL) suppository 650 mg    senna-docusate (PERICOLACE) 8.6-50 mg per tablet 2 Tab    albuterol (PROVENTIL VENTOLIN) nebulizer solution 2.5 mg    folic acid (FOLVITE) 1 mg, thiamine (B-1) 100 mg in 0.9% sodium chloride 50 mL ivpb    heparin (porcine) injection 5,000 Units     Data:      [x] REVIEWED  Recent Results (from the past 24 hour(s))   GLUCOSE, POC    Collection Time: 10/23/18  9:47 PM   Result Value Ref Range    Glucose (POC) 125 (H) 70 - 110 mg/dL   CBC W/O DIFF    Collection Time: 10/24/18  5:00 AM   Result Value Ref Range    WBC 6.3 4.6 - 13.2 K/uL    RBC 4.65 (L) 4.70 - 5.50 M/uL    HGB 14.7 13.0 - 16.0 g/dL    HCT 42.4 36.0 - 48.0 %    MCV 91.2 74.0 - 97.0 FL    MCH 31.6 24.0 - 34.0 PG    MCHC 34.7 31.0 - 37.0 g/dL    RDW 13.2 11.6 - 14.5 %    PLATELET 329 075 - 006 K/uL    MPV 10.7 9.2 - 22.5 FL   METABOLIC PANEL, BASIC    Collection Time: 10/24/18  5:00 AM   Result Value Ref Range    Sodium 144 136 - 145 mmol/L    Potassium 3.1 (L) 3.5 - 5.5 mmol/L    Chloride 106 100 - 108 mmol/L    CO2 30 21 - 32 mmol/L    Anion gap 8 3.0 - 18 mmol/L    Glucose 104 (H) 74 - 99 mg/dL    BUN 10 7.0 - 18 MG/DL    Creatinine 1.16 0.6 - 1.3 MG/DL    BUN/Creatinine ratio 9 (L) 12 - 20      GFR est AA >60 >60 ml/min/1.73m2    GFR est non-AA 60 (L) >60 ml/min/1.73m2    Calcium 8.5 8.5 - 10.1 MG/DL   GLUCOSE, POC    Collection Time: 10/24/18  7:59 AM   Result Value Ref Range    Glucose (POC) 122 (H) 70 - 110 mg/dL       Severa Pippin, NP

## 2018-10-24 NOTE — PROGRESS NOTES
Progress Note      Patient: Jesse Christopher               Sex: male          DOA: 10/22/2018       YOB: 1934      Age:  80 y.o.        LOS:  LOS: 2 days               Subjective / Interval Hx  I    Jesse Christopher is a 80 y.o. male  With hx hypertension , hyperlipidemia , bph , sleep disorder, GERD who presents with mild slurred speech and admitted for stroke . MRI brain showed Multifocal pattern of acute infarction in the left hemisphere, primarily MCA territory, likely embolic without hemorrhage or significant mass effect. Patient denies palpitation and focal weakness. His speech is back at baseline. Patient also c/o chronic sleep disorder and was scheduled for sleep clinic 10/30/18. Patient was seen by neurology and recommend Plavix and aspirin for 3 weeks and then only aspirin from Nov 12. Patient ECHO still pending. Since stroke is probably embolic if echo is negative for thrombus will need a loop recorder on discharge. Patient also has tachycardia but no CP or SOB. He has hx BPH and has been having hesitancy and just a few drops. He will be started on Flomax. Bladder scan done bedside shows 290 cc. Objective:      Visit Vitals  /89 (BP 1 Location: Left arm, BP Patient Position: At rest)   Pulse (!) 102   Temp 98 °F (36.7 °C)   Resp 15   Ht 5' 7\" (1.702 m)   Wt 81.6 kg (180 lb)   SpO2 95%   BMI 28.19 kg/m²             Physical Exam   Constitutional: He is oriented to person, place, and time. He appears well-developed and well-nourished. No distress. HENT:   Head: Normocephalic and atraumatic. Mouth/Throat: No oropharyngeal exudate. Eyes: Conjunctivae are normal. No scleral icterus. Neck: Neck supple. Cardiovascular: Normal rate, regular rhythm and normal heart sounds. No murmur heard. Pulmonary/Chest: Effort normal and breath sounds normal. No respiratory distress. He has no wheezes. He has no rales. Abdominal: Soft.  Bowel sounds are normal.   Musculoskeletal: He exhibits no edema. Neurological: He is alert and oriented to person, place, and time. He has normal strength. No cranial nerve deficit or sensory deficit. Skin: Skin is warm. No rash noted. He is not diaphoretic. No erythema. No pallor. Psychiatric: He has a normal mood and affect.          Intake and Output:  Current Shift:  10/24 0701 - 10/24 1900  In: 240 [P.O.:240]  Out: -   Last three shifts:  10/22 1901 - 10/24 0700  In: 1140 [P.O.:1140]  Out: 1000 [Urine:1000]    Recent Results (from the past 48 hour(s))   GLUCOSE, POC    Collection Time: 10/22/18  1:44 PM   Result Value Ref Range    Glucose (POC) 122 (H) 70 - 110 mg/dL   TYPE & SCREEN    Collection Time: 10/22/18  1:45 PM   Result Value Ref Range    Crossmatch Expiration 10/25/2018     ABO/Rh(D) A POSITIVE     Antibody screen POS     Antibody ID [id number])     Unit number [id number]     Blood component type [facility]     Unit division 00     Status of unit ALLOCATED     ANTIGEN/ANTIBODY INFO FY(A) NEGATIVE,     Crossmatch result Compatible     Unit number [id number]     Blood component type [facility]     Unit division 00     Status of unit ALLOCATED     ANTIGEN/ANTIBODY INFO FY(A) NEGATIVE,     Crossmatch result Compatible    CBC W/O DIFF    Collection Time: 10/22/18  1:55 PM   Result Value Ref Range    WBC 5.4 4.6 - 13.2 K/uL    RBC 4.41 (L) 4.70 - 5.50 M/uL    HGB 14.1 13.0 - 16.0 g/dL    HCT 40.8 36.0 - 48.0 %    MCV 92.5 74.0 - 97.0 FL    MCH 32.0 24.0 - 34.0 PG    MCHC 34.6 31.0 - 37.0 g/dL    RDW 13.5 11.6 - 14.5 %    PLATELET 814 682 - 398 K/uL    MPV 10.0 9.2 - 16.8 FL   METABOLIC PANEL, COMPREHENSIVE    Collection Time: 10/22/18  1:55 PM   Result Value Ref Range    Sodium 143 136 - 145 mmol/L    Potassium 3.6 3.5 - 5.5 mmol/L    Chloride 107 100 - 108 mmol/L    CO2 30 21 - 32 mmol/L    Anion gap 6 3.0 - 18 mmol/L    Glucose 125 (H) 74 - 99 mg/dL    BUN 14 7.0 - 18 MG/DL    Creatinine 1.41 (H) 0.6 - 1.3 MG/DL    BUN/Creatinine ratio 10 (L) 12 - 20 GFR est AA 58 (L) >60 ml/min/1.73m2    GFR est non-AA 48 (L) >60 ml/min/1.73m2    Calcium 8.7 8.5 - 10.1 MG/DL    Bilirubin, total 0.6 0.2 - 1.0 MG/DL    ALT (SGPT) 30 16 - 61 U/L    AST (SGOT) 17 15 - 37 U/L    Alk.  phosphatase 85 45 - 117 U/L    Protein, total 6.3 (L) 6.4 - 8.2 g/dL    Albumin 3.6 3.4 - 5.0 g/dL    Globulin 2.7 2.0 - 4.0 g/dL    A-G Ratio 1.3 0.8 - 1.7     PROTHROMBIN TIME + INR    Collection Time: 10/22/18  1:55 PM   Result Value Ref Range    Prothrombin time 13.0 11.5 - 15.2 sec    INR 1.0 0.8 - 1.2     THROMBIN TIME    Collection Time: 10/22/18  1:55 PM   Result Value Ref Range    Thrombin time 16.5 13.8 - 18.2 SECS   FIBRINOGEN    Collection Time: 10/22/18  1:55 PM   Result Value Ref Range    Fibrinogen 332 210 - 451 mg/dL   ASPIRIN TEST    Collection Time: 10/22/18  1:55 PM   Result Value Ref Range    Aspirin test 575 (L) 620 - 672 ARU   HEMOGLOBIN A1C W/O EAG    Collection Time: 10/22/18  1:55 PM   Result Value Ref Range    Hemoglobin A1c 5.6 4.2 - 5.6 %   LIPID PANEL    Collection Time: 10/22/18  1:55 PM   Result Value Ref Range    LIPID PROFILE          Cholesterol, total 133 <200 MG/DL    Triglyceride 185 (H) <150 MG/DL    HDL Cholesterol 44 40 - 60 MG/DL    LDL, calculated 52 0 - 100 MG/DL    VLDL, calculated 37 MG/DL    CHOL/HDL Ratio 3.0 0 - 5.0     TROPONIN I    Collection Time: 10/22/18  1:55 PM   Result Value Ref Range    Troponin-I, Qt. 0.02 0.0 - 0.045 NG/ML   LIPID PANEL    Collection Time: 10/22/18  1:55 PM   Result Value Ref Range    LIPID PROFILE          Cholesterol, total 122 <200 MG/DL    Triglyceride 168 (H) <150 MG/DL    HDL Cholesterol 41 40 - 60 MG/DL    LDL, calculated 47.4 0 - 100 MG/DL    VLDL, calculated 33.6 MG/DL    CHOL/HDL Ratio 3.0 0 - 5.0     SED RATE (ESR)    Collection Time: 10/22/18  1:55 PM   Result Value Ref Range    Sed rate, automated 5 0 - 20 mm/hr   CRP, HIGH SENSITIVITY    Collection Time: 10/22/18  1:55 PM   Result Value Ref Range    C-Reactive Protein, Cardiac 6.01 (H) 0.00 - 3.00 mg/L   TSH 3RD GENERATION    Collection Time: 10/22/18  1:55 PM   Result Value Ref Range    TSH 1.40 0.36 - 3.74 uIU/mL   T3, FREE    Collection Time: 10/22/18  1:55 PM   Result Value Ref Range    Triiodothyronine (T3), free 2.4 2.18 - 3.98 PG/ML   T4, FREE    Collection Time: 10/22/18  1:55 PM   Result Value Ref Range    T4, Free 1.0 0.7 - 1.5 NG/DL   EKG, 12 LEAD, INITIAL    Collection Time: 10/22/18  2:21 PM   Result Value Ref Range    Ventricular Rate 58 BPM    Atrial Rate 58 BPM    P-R Interval 164 ms    QRS Duration 102 ms    Q-T Interval 414 ms    QTC Calculation (Bezet) 406 ms    Calculated P Axis -4 degrees    Calculated R Axis -3 degrees    Calculated T Axis 41 degrees    Diagnosis       Sinus bradycardia with occasional premature ventricular complexes  Otherwise normal ECG  When compared with ECG of 05-DEC-2017 15:30,  premature ventricular complexes are now present  Confirmed by Ashli Chowdhury ([id number]) on 10/22/2018 3:47:32 PM     URINALYSIS W/ RFLX MICROSCOPIC    Collection Time: 10/22/18  2:45 PM   Result Value Ref Range    Color YELLOW      Appearance CLEAR      Specific gravity 1.014 1.005 - 1.030      pH (UA) 6.5 5.0 - 8.0      Protein NEGATIVE  NEG mg/dL    Glucose NEGATIVE  NEG mg/dL    Ketone NEGATIVE  NEG mg/dL    Bilirubin NEGATIVE  NEG      Blood NEGATIVE  NEG      Urobilinogen 0.2 0.2 - 1.0 EU/dL    Nitrites NEGATIVE  NEG      Leukocyte Esterase NEGATIVE  NEG     DRUG SCREEN, URINE    Collection Time: 10/22/18  2:45 PM   Result Value Ref Range    BENZODIAZEPINES NEGATIVE  NEG      BARBITURATES NEGATIVE  NEG      THC (TH-CANNABINOL) NEGATIVE  NEG      OPIATES NEGATIVE  NEG      PCP(PHENCYCLIDINE) NEGATIVE  NEG      COCAINE NEGATIVE  NEG      AMPHETAMINES NEGATIVE  NEG      METHADONE NEGATIVE  NEG      HDSCOM (NOTE)    GLUCOSE, POC    Collection Time: 10/22/18  6:26 PM   Result Value Ref Range    Glucose (POC) 140 (H) 70 - 110 mg/dL   GLUCOSE, POC Collection Time: 10/22/18 10:40 PM   Result Value Ref Range    Glucose (POC) 122 (H) 70 - 110 mg/dL   GLUCOSE, POC    Collection Time: 10/23/18  8:00 AM   Result Value Ref Range    Glucose (POC) 120 (H) 70 - 110 mg/dL   CBC W/O DIFF    Collection Time: 10/23/18  8:20 AM   Result Value Ref Range    WBC 5.2 4.6 - 13.2 K/uL    RBC 4.35 (L) 4.70 - 5.50 M/uL    HGB 13.7 13.0 - 16.0 g/dL    HCT 39.9 36.0 - 48.0 %    MCV 91.7 74.0 - 97.0 FL    MCH 31.5 24.0 - 34.0 PG    MCHC 34.3 31.0 - 37.0 g/dL    RDW 13.2 11.6 - 14.5 %    PLATELET 110 052 - 961 K/uL    MPV 10.5 9.2 - 03.0 FL   METABOLIC PANEL, BASIC    Collection Time: 10/23/18  8:20 AM   Result Value Ref Range    Sodium 142 136 - 145 mmol/L    Potassium 3.0 (L) 3.5 - 5.5 mmol/L    Chloride 107 100 - 108 mmol/L    CO2 28 21 - 32 mmol/L    Anion gap 7 3.0 - 18 mmol/L    Glucose 102 (H) 74 - 99 mg/dL    BUN 15 7.0 - 18 MG/DL    Creatinine 1.22 0.6 - 1.3 MG/DL    BUN/Creatinine ratio 12 12 - 20      GFR est AA >60 >60 ml/min/1.73m2    GFR est non-AA 57 (L) >60 ml/min/1.73m2    Calcium 8.4 (L) 8.5 - 10.1 MG/DL   GLUCOSE, POC    Collection Time: 10/23/18  9:47 PM   Result Value Ref Range    Glucose (POC) 125 (H) 70 - 110 mg/dL   CBC W/O DIFF    Collection Time: 10/24/18  5:00 AM   Result Value Ref Range    WBC 6.3 4.6 - 13.2 K/uL    RBC 4.65 (L) 4.70 - 5.50 M/uL    HGB 14.7 13.0 - 16.0 g/dL    HCT 42.4 36.0 - 48.0 %    MCV 91.2 74.0 - 97.0 FL    MCH 31.6 24.0 - 34.0 PG    MCHC 34.7 31.0 - 37.0 g/dL    RDW 13.2 11.6 - 14.5 %    PLATELET 055 935 - 255 K/uL    MPV 10.7 9.2 - 02.4 FL   METABOLIC PANEL, BASIC    Collection Time: 10/24/18  5:00 AM   Result Value Ref Range    Sodium 144 136 - 145 mmol/L    Potassium 3.1 (L) 3.5 - 5.5 mmol/L    Chloride 106 100 - 108 mmol/L    CO2 30 21 - 32 mmol/L    Anion gap 8 3.0 - 18 mmol/L    Glucose 104 (H) 74 - 99 mg/dL    BUN 10 7.0 - 18 MG/DL    Creatinine 1.16 0.6 - 1.3 MG/DL    BUN/Creatinine ratio 9 (L) 12 - 20      GFR est AA >60 >60 ml/min/1.73m2    GFR est non-AA 60 (L) >60 ml/min/1.73m2    Calcium 8.5 8.5 - 10.1 MG/DL   GLUCOSE, POC    Collection Time: 10/24/18  7:59 AM   Result Value Ref Range    Glucose (POC) 122 (H) 70 - 110 mg/dL       Lab/Data Reviewed: All lab results for the last 24 hours reviewed. XRays were reviewed in past 24 hours    Medications Reviewed            Assessment/Plan     Principal Problem:    Cerebrovascular accident (CVA) due to embolism of left middle cerebral artery ([address] (10/22/2018)    Active Problems:    BPH (benign prostatic hyperplasia) (11/18/2013)      Essential hypertension, benign (11/18/2013)      TIA (transient ischemic attack) (10/22/2018)        Care plan   Stroke   - MRI brain 10/22 showed Multifocal pattern of acute infarction in the left hemisphere, primarily MCA  territory, likely embolic without hemorrhage or significant mass effect  - Permissible hypertension per stroke protocol   - aspirin , Lipitor , Plavix   - PT/OT/Speech evaluation   - ECHO ordered 10/22  - CTA head and neck noted    - Neurology consult . Appreciated. Recommend aspirin , plavix for 3 weeks then aspirin only.      Hypertension   - permissible htn per stroke protocol for 48 hours and then keep BP < 130/80.   - restart BP medication PM today    Hypertriglyceridemia  -fenofibrate start 10/24   - fu/u for repeat Lipid panel 4 weeks with PCP  - advised to eat less carbohydrates    BPH   - Flomax  - UA ordered   - f/u Urology out patient     HLD  - Lipitor 40 mg every day     Sleep disorder   - follow up with appointment out patient     DVT prophylaxis - heparin     Full code       Bria Coleman MD  October 24, 2018

## 2018-10-24 NOTE — PROGRESS NOTES
Bedside and Verbal shift change report given to Orin Cruz RN (oncoming nurse) by Michael Alves RN (offgoing nurse). Report included the following information SBAR, Kardex, Intake/Output, MAR and Recent Results. Dual NIH performed.

## 2018-10-24 NOTE — PROGRESS NOTES
Assume care of  patient lying in bed  Watching TV. Alert and oriented X 4. Denies pain or discomfort at present. Bed locked in lowest position. Dual NIH with Delfino. Call light within reach and understand to use for assistance and needs. 2200 Ambulatory to bathroom unassisted with steady gait. Patient heart rate elevated while up in room. 10/24/2018    0045 Patient complaining of increase anxiety and unable to sleep. States he is worried about the unknown. 0200 Ativan administered orally for increase anxiety with patient up to bathroom several times within 1 hour. Heart rate elevated each time while ambulatory or standing out of bed.     0300 Patient states \"I feel much calmer now since I had the pill. Will monitor for decrease anxiety. 0500 Patient appears calmer but using urinal frequently and heart rate increases when up.     0730 Bedside and Verbal shift change report given to ARIS Saleh (oncoming nurse) by Uriel Ptots RN (offgoing nurse). Report given with SBAR, Kardex, Intake/Output, MAR and Recent Results.

## 2018-10-24 NOTE — PROGRESS NOTES
Problem: Mobility Impaired (Adult and Pediatric)  Goal: *Acute Goals and Plan of Care (Insert Text)  Physical Therapy Goals   Initiated 10/23/2018 and to be accomplished within 7 days. 1.  Patient will complete all bed mobility with independence in order to prepare for EOB/OOB activity. 2.  Patient will perform sit <> stand with independence in order to prepare for OOB/gait activity. 3.  Patient will perform bed to chair transfers with independence in order to promote mobility and encourage seated activity to progress towards their prior level of function. 4.  Patient will ambulate 300 feet with independence using LRAD in order to prepare for safe negotiation of their environment. Outcome: Resolved/Met Date Met: 10/24/18  PHYSICAL THERAPY: Daily Treatment Note/Discharge   INPATIENT: Medicare: Hospital Day: 3     Patient: Maurice Riggs (48 y.o. male)    Date: 10/24/2018  Primary Diagnosis: TIA (transient ischemic attack)   ,  ,   Precautions: Fall        Chart, physical therapy assessment, plan of care and goals were reviewed. PLOF:Independent    ASSESSMENT:  Pt independent with bed mobility, transfers and gait X4 40 ft including 10 stairs. Pt has met acute care goals and will be discharged at this time. EDUCATION:   Education:  Patient was educated on the following topics: home safety   Barriers to Learning/Limitations: None  Compensate with: visual, verbal, tactile, kinesthetic cues/model    Progression toward goals:  [x]      Goals met  []      Improving appropriately and progressing toward goals  []      Improving slowly and progressing toward goals  []      Not making progress toward goals and plan of care will be adjusted     PLAN:  Patient will be discharged from physical therapy at this time.   Rationale for discharge:  [x] Goals Achieved  [] [address]  [] Patient not participating in therapy  [] Other:  Discharge Recommendations:  Outpatient  Further Equipment Recommendations for Discharge:  N/A  Factors which may impact discharge planning: none     SUBJECTIVE:   Patient stated I hope I can get out of here.     OBJECTIVE DATA SUMMARY:   Critical Behavior:  Neurologic State: Alert  Orientation Level: Oriented X4  Cognition: Appropriate for age attention/concentration, Appropriate safety awareness  Safety/Judgement: Fall prevention    G CODE:Mobility  Current  CH= 0%   Goal  CH= 0%  D/C  CH= 0%. The severity rating is based on the [facility]  0: Pt performs 25% or less of standing activity (Max assist) CN, 100% impaired. 1: Pt supports self with upper extremities but requires therapist assistance. Pt performs 25-50% of effort (Mod assist) CM, 80% to <100% impaired. 1+: Pt supports self with upper extremities but requires therapist assistance. Pt performs >50% effort. (Min assist). CL, 60% to <80% impaired. 2: Pt supports self independently with both upper extremities (walker, crutches, parallel bars). CL, 60% to <80% impaired. 2+: Pt support self independently with 1 upper extremity (cane, crutch, 1 parallel bar). CK, 40% to <60% impaired. 3: Pt stands without upper extremity support for up to 30 seconds. CK, 40% to <60% impaired. 3+: Pt stands without upper extremity support for 30 seconds or greater. CJ, 20% to <40% impaired. 4: Pt independently moves and returns center of gravity 1-2 inches in one plane. CJ, 20% to <40% impaired. 4+: Pt independently moves and returns center of gravity 1-2 inches in multiple planes. CI, 1% to <20% impaired. 5: Pt independently moves and returns center of gravity in all planes greater than 2 inches. CH, 0% impaired.     Functional Mobility:      Functional Status      Indep   (I)   Mod I   Super-vision   Min A   Mod A   Max A   Total A   Assist x2 Verbal cues Additional time Not tested   Comments   Rolling [x]  []  [] []    []    []  []  [] [] [] []    Supine to sit [x]  []  [] []  []  [] []  [] [] [] []    Sit to supine [x]  []  [] []  []  []  []  [] [] [] []    Sit to stand [x]  []  [] []  []  []  []  [] [] [] []    Stand to sit [x]  []  [] []  []  []  []  [] [] [] []    Bed to chair transfers [x]  []  [] []  []  []  []  [] [] [] []        Balance    Good   Fair   Poor   Unable   Not tested   Comments   Sitting static [x]  []  []  []  []    Sitting dynamic [x]  []  []  []  []    Standing static [x]  []  []  []  []    Standing dynamic [x]  []  []  []  []          Mobility/Gait:   Level of Assistance: Independent  Assistive Device: none  Distance Ambulated: 440 feet       Base of Support: WFL  Speed/Yuliana: WFL  Step Length: WFL  Swing Pattern: WFL  Stance: WFL  Gait Abnormalities: WFL    Stairs:   Level of Assistance: Independent  Assistive Device: none  Rail Use: both  Number of Stairs: 10      Neuro Re-Education:  Stroke symptoms    Vital Signs  Temp: 98.2 °F (36.8 °C)     Pulse (Heart Rate): 95     BP: 157/74     Resp Rate: 17     O2 Sat (%): 93 %  Pain:  Pre treatment pain level:0  Post treatment pain level:0  Pain Scale 1: Numeric (0 - 10)  Pain Intensity 1: 0              Activity Tolerance:   Good       After treatment:   Patient left in no apparent distress sitting up in chair    Call bell left within reach  Nursing notified      Leila Mckoy PTA   Time Calculation: 18 mins

## 2018-10-25 VITALS
TEMPERATURE: 98.2 F | HEART RATE: 93 BPM | WEIGHT: 180 LBS | RESPIRATION RATE: 14 BRPM | OXYGEN SATURATION: 97 % | HEIGHT: 67 IN | DIASTOLIC BLOOD PRESSURE: 70 MMHG | SYSTOLIC BLOOD PRESSURE: 135 MMHG | BODY MASS INDEX: 28.25 KG/M2

## 2018-10-25 PROBLEM — E87.6 HYPOKALEMIA: Status: ACTIVE | Noted: 2018-10-25

## 2018-10-25 LAB
ANION GAP SERPL CALC-SCNC: 6 MMOL/L (ref 3–18)
ANION GAP SERPL CALC-SCNC: 8 MMOL/L (ref 3–18)
BUN SERPL-MCNC: 11 MG/DL (ref 7–18)
BUN SERPL-MCNC: 8 MG/DL (ref 7–18)
BUN/CREAT SERPL: 8 (ref 12–20)
BUN/CREAT SERPL: 9 (ref 12–20)
CALCIUM SERPL-MCNC: 8.1 MG/DL (ref 8.5–10.1)
CALCIUM SERPL-MCNC: 8.5 MG/DL (ref 8.5–10.1)
CHLORIDE SERPL-SCNC: 105 MMOL/L (ref 100–108)
CHLORIDE SERPL-SCNC: 108 MMOL/L (ref 100–108)
CO2 SERPL-SCNC: 29 MMOL/L (ref 21–32)
CO2 SERPL-SCNC: 30 MMOL/L (ref 21–32)
CREAT SERPL-MCNC: 1.03 MG/DL (ref 0.6–1.3)
CREAT SERPL-MCNC: 1.2 MG/DL (ref 0.6–1.3)
ECHO AO ROOT DIAM: 3.6 CM
ECHO AV AREA PEAK VELOCITY: 1.6 CM2
ECHO AV AREA PLAN/BSA: 1.6
ECHO AV MEAN GRADIENT: 15.7 MMHG
ECHO AV PEAK GRADIENT: 15.7 MMHG
ECHO LA AREA 2C: 28 CM2
ECHO LA AREA 4C: 17.1 CM2
ECHO LA VOL 2C: 103 ML (ref 18–58)
ECHO LA VOL 4C: 45 ML (ref 18–58)
ECHO LA VOLUME INDEX A2C: 53.28 ML/M2 (ref 16–28)
ECHO LA VOLUME INDEX A4C: 23.28 ML/M2 (ref 16–28)
ECHO LV EDV A2C: 69 ML
ECHO LV EDV A4C: 99 ML
ECHO LV EDV BP: 95 ML (ref 67–155)
ECHO LV EDV INDEX A4C: 51.2 ML/M2
ECHO LV EDV INDEX BP: 49.1 ML/M2
ECHO LV EDV NDEX A2C: 35.7 ML/M2
ECHO LV ESV A2C: 45 ML
ECHO LV ESV A4C: 56 ML
ECHO LV ESV BP: 47 ML (ref 22–58)
ECHO LV ESV INDEX A2C: 23.3 ML/M2
ECHO LV ESV INDEX A4C: 29 ML/M2
ECHO LV ESV INDEX BP: 24.3 ML/M2
ECHO LV INTERNAL DIMENSION DIASTOLIC: 4.5 CM (ref 4.2–5.9)
ECHO LV INTERNAL DIMENSION SYSTOLIC: 3.4 CM
ECHO LV IVSD: 1.1 CM (ref 0.6–1)
ECHO LV POSTERIOR WALL DIASTOLIC: 1 CM (ref 0.6–1)
ECHO LVOT DIAM: 2 CM
ECHO LVOT PEAK GRADIENT: 4.4 MMHG
ECHO MV E DECELERATION TIME (DT): 29 MS
ECHO MV E VELOCITY: 1.64 CM/S
ECHO RV TAPSE: 2.9 CM (ref 1.5–2)
ERYTHROCYTE [DISTWIDTH] IN BLOOD BY AUTOMATED COUNT: 13 % (ref 11.6–14.5)
GLUCOSE BLD STRIP.AUTO-MCNC: 110 MG/DL (ref 70–110)
GLUCOSE BLD STRIP.AUTO-MCNC: 150 MG/DL (ref 70–110)
GLUCOSE SERPL-MCNC: 103 MG/DL (ref 74–99)
GLUCOSE SERPL-MCNC: 93 MG/DL (ref 74–99)
HCT VFR BLD AUTO: 41.5 % (ref 36–48)
HGB BLD-MCNC: 14.3 G/DL (ref 13–16)
MCH RBC QN AUTO: 31.1 PG (ref 24–34)
MCHC RBC AUTO-ENTMCNC: 34.5 G/DL (ref 31–37)
MCV RBC AUTO: 90.2 FL (ref 74–97)
PLATELET # BLD AUTO: 207 K/UL (ref 135–420)
PMV BLD AUTO: 10.9 FL (ref 9.2–11.8)
POTASSIUM SERPL-SCNC: 2.8 MMOL/L (ref 3.5–5.5)
POTASSIUM SERPL-SCNC: 3.5 MMOL/L (ref 3.5–5.5)
RBC # BLD AUTO: 4.6 M/UL (ref 4.7–5.5)
SODIUM SERPL-SCNC: 143 MMOL/L (ref 136–145)
SODIUM SERPL-SCNC: 143 MMOL/L (ref 136–145)
WBC # BLD AUTO: 6.1 K/UL (ref 4.6–13.2)

## 2018-10-25 PROCEDURE — 74011250636 HC RX REV CODE- 250/636: Performed by: NURSE PRACTITIONER

## 2018-10-25 PROCEDURE — 82962 GLUCOSE BLOOD TEST: CPT

## 2018-10-25 PROCEDURE — 74011250637 HC RX REV CODE- 250/637: Performed by: NURSE PRACTITIONER

## 2018-10-25 PROCEDURE — 74011250637 HC RX REV CODE- 250/637: Performed by: HOSPITALIST

## 2018-10-25 PROCEDURE — 74011250636 HC RX REV CODE- 250/636: Performed by: HOSPITALIST

## 2018-10-25 PROCEDURE — 36415 COLL VENOUS BLD VENIPUNCTURE: CPT | Performed by: FAMILY MEDICINE

## 2018-10-25 PROCEDURE — 80048 BASIC METABOLIC PNL TOTAL CA: CPT | Performed by: FAMILY MEDICINE

## 2018-10-25 PROCEDURE — 85027 COMPLETE CBC AUTOMATED: CPT | Performed by: FAMILY MEDICINE

## 2018-10-25 PROCEDURE — 74011250636 HC RX REV CODE- 250/636: Performed by: FAMILY MEDICINE

## 2018-10-25 PROCEDURE — 74011250637 HC RX REV CODE- 250/637: Performed by: FAMILY MEDICINE

## 2018-10-25 RX ORDER — CLOPIDOGREL BISULFATE 75 MG/1
75 TABLET ORAL DAILY
Qty: 21 TAB | Refills: 0 | Status: SHIPPED | OUTPATIENT
Start: 2018-10-25 | End: 2021-03-12

## 2018-10-25 RX ORDER — FOLIC ACID 1 MG/1
1 TABLET ORAL DAILY
Status: DISCONTINUED | OUTPATIENT
Start: 2018-10-25 | End: 2018-10-25 | Stop reason: HOSPADM

## 2018-10-25 RX ORDER — ASPIRIN 325 MG
325 TABLET ORAL DAILY
Qty: 30 TAB | Refills: 0 | Status: SHIPPED | OUTPATIENT
Start: 2018-10-25 | End: 2021-03-12

## 2018-10-25 RX ORDER — POTASSIUM CHLORIDE 1500 MG/1
40 TABLET, FILM COATED, EXTENDED RELEASE ORAL DAILY
Qty: 7 TAB | Refills: 0 | Status: SHIPPED | OUTPATIENT
Start: 2018-10-25

## 2018-10-25 RX ORDER — POTASSIUM CHLORIDE 750 MG/1
40 TABLET, FILM COATED, EXTENDED RELEASE ORAL
Status: COMPLETED | OUTPATIENT
Start: 2018-10-25 | End: 2018-10-25

## 2018-10-25 RX ORDER — TAMSULOSIN HYDROCHLORIDE 0.4 MG/1
0.4 CAPSULE ORAL DAILY
Qty: 30 CAP | Refills: 0 | Status: SHIPPED | OUTPATIENT
Start: 2018-10-25

## 2018-10-25 RX ORDER — ASPIRIN 325 MG/1
100 TABLET, FILM COATED ORAL DAILY
Status: DISCONTINUED | OUTPATIENT
Start: 2018-10-25 | End: 2018-10-25 | Stop reason: HOSPADM

## 2018-10-25 RX ORDER — ATORVASTATIN CALCIUM 40 MG/1
40 TABLET, FILM COATED ORAL
Qty: 30 TAB | Refills: 0 | Status: SHIPPED | OUTPATIENT
Start: 2018-10-25

## 2018-10-25 RX ORDER — FENOFIBRATE 48 MG/1
48 TABLET, COATED ORAL DAILY
Qty: 30 TAB | Refills: 0 | Status: SHIPPED | OUTPATIENT
Start: 2018-10-25 | End: 2021-11-01

## 2018-10-25 RX ADMIN — POTASSIUM CHLORIDE 40 MEQ: 750 TABLET, FILM COATED, EXTENDED RELEASE ORAL at 10:38

## 2018-10-25 RX ADMIN — POTASSIUM CHLORIDE 40 MEQ: 750 TABLET, FILM COATED, EXTENDED RELEASE ORAL at 06:51

## 2018-10-25 RX ADMIN — POTASSIUM CHLORIDE 40 MEQ: 750 TABLET, FILM COATED, EXTENDED RELEASE ORAL at 13:50

## 2018-10-25 RX ADMIN — CLOPIDOGREL BISULFATE 75 MG: 75 TABLET ORAL at 10:38

## 2018-10-25 RX ADMIN — HEPARIN SODIUM 5000 UNITS: 5000 INJECTION INTRAVENOUS; SUBCUTANEOUS at 10:39

## 2018-10-25 RX ADMIN — HEPARIN SODIUM 5000 UNITS: 5000 INJECTION INTRAVENOUS; SUBCUTANEOUS at 02:24

## 2018-10-25 RX ADMIN — LORAZEPAM 2 MG: 2 INJECTION, SOLUTION INTRAMUSCULAR; INTRAVENOUS at 05:47

## 2018-10-25 RX ADMIN — FENOFIBRATE 48 MG: 48 TABLET, FILM COATED ORAL at 10:40

## 2018-10-25 RX ADMIN — SODIUM CHLORIDE 100 ML/HR: 900 INJECTION, SOLUTION INTRAVENOUS at 05:53

## 2018-10-25 RX ADMIN — Medication 100 MG: at 10:38

## 2018-10-25 RX ADMIN — ASPIRIN 325 MG ORAL TABLET 325 MG: 325 PILL ORAL at 10:37

## 2018-10-25 RX ADMIN — TAMSULOSIN HYDROCHLORIDE 0.4 MG: 0.4 CAPSULE ORAL at 10:37

## 2018-10-25 RX ADMIN — FOLIC ACID 1 MG: 1 TABLET ORAL at 10:40

## 2018-10-25 NOTE — PROGRESS NOTES
Problem: Falls - Risk of  Goal: *Absence of Falls  Document Kinza Fall Risk and appropriate interventions in the flowsheet.   Outcome: Progressing Towards Goal  Fall Risk Interventions:            Medication Interventions: Evaluate medications/consider consulting pharmacy

## 2018-10-25 NOTE — DISCHARGE SUMMARY
DISCHARGE SUMMARY        PATIENT ID: Roby Dickinson  MRN: [id number]   YOB: 1934   AGE: 80 y.o. DATE OF ADMISSION: 10/22/2018  1:45 PM    DATE OF DISCHARGE: 10/28/2018  PRIMARY CARE PROVIDER: Vinita Bach MD     Procedure Performed:  none      DISCHARGING PHYSICIAN: Marbella Ndiaye MD    Call hospitalist office [phone]. Discharge Diagnosis:   Patient Active Problem List    Diagnosis Date Noted    Hypokalemia 10/25/2018    TIA (transient ischemic attack) 10/22/2018    Cerebrovascular accident (CVA) due to embolism of left middle cerebral artery ([facility] [address] 10/22/2018    BPH (benign prostatic hyperplasia) 11/18/2013    Esophageal reflux 11/18/2013    Essential hypertension, benign 11/18/2013    Other and unspecified hyperlipidemia 11/18/2013    ED (erectile dysfunction) 11/18/2013    CRF (chronic renal failure) 11/18/2013              CONSULTATIONS: IP CONSULT TO NEUROLOGY    History of Present Ilness:    Cyn Patricio a 80 y. o. male with hypertension and CKD who presents with after he noticed mild slurred speech that occurred 20 minutes PTA along with right sided facial droop. Noted some numbness to left side of face. Patient notes improvement however concern for TIA. Patient notes his symptoms are improving. No sob, cp, fever ,chills, n/v, or any other acute complaitnsa t this time. Code S was called and admit for TIA.            Hospital Course:     Roby Dickinson is a 80 y.o. male  With hx hypertension , hyperlipidemia , bph , sleep disorder, GERD who presents with mild slurred speech and admitted for stroke . MRI brain showed Multifocal pattern of acute infarction in the left hemisphere, primarily MCA territory, likely embolic without hemorrhage or significant mass effect. Patient denies palpitation and focal weakness. His speech is back at baseline. Patient also c/o chronic sleep disorder and was scheduled for sleep clinic 10/30/18. Patient was seen by neurology and recommend Plavix and aspirin for 3 weeks and then only aspirin from Nov 12. Patient ECHO EF 50% with no thrombus. Since stroke is probably embolic if echo is negative for thrombus will need a loop recorder on discharge. Cardiology was consulted to arrange for loop recorder out patient. At the timer of discharge patient was stable. Patient will also start fenofibrate for hypertriglyceridemia and advised to eat diet less in carbohydrates. Physical Exam on Discharge:  Visit Vitals  /70 (BP 1 Location: Right arm, BP Patient Position: At rest)   Pulse 93   Temp 98.2 °F (36.8 °C)   Resp 14   Ht 5' 7\" (1.702 m)   Wt 81.6 kg (180 lb)   SpO2 97%   BMI 28.19 kg/m²       General:  Alert, cooperative, no distress, appears stated age. Lungs:   Clear to auscultation bilaterally. Chest wall:  No tenderness or deformity. Heart:  Regular rate and rhythm, S1, S2 normal, no murmur, click, rub or gallop. Abdomen:   Soft, non-tender. Bowel sounds normal. No masses,  No organomegaly. Extremities: Extremities normal, atraumatic, no cyanosis or edema. Pulses: 2+ and symmetric all extremities. Skin: Skin color, texture, turgor normal. No rashes or lesions   Neurologic: CNII-XII intact. Pertinent Results:    No results for input(s): BUN, NA, POTASSIUM, CHLORIDE, CO2 in the last 72 hours. No lab exists for component: GLUCOSE, CALCIUM, CREAT  No results for input(s): WBC, RBC, HCT, MCV, MCH, MCHC, RDW, HCTEXT, HCTEXT in the last 72 hours. No lab exists for component: HEMOGLOBIN, PLATELET  All Micro Results     None          CT Results  (Last 48 hours)    None        Brain MR without contrast:     Indication: Difficulty speaking, slurred speech. Concern for hemorrhage or acute  infarction.     Procedure: Sagittal spin echo T1, axial FSE FLAIR and T2 and axial diffusion  weighted scanning was performed.   A susceptibility weighted (SWI) sequence was  performed, quite sensitive for hemosiderin, calcification and venous anatomy. Coronal FSE T2 images were also performed. No contrast was administered.     Comparison exam: Head CT earlier today.     Findings: Diffusion: There is a multifocal pattern of cortical and subcortical  restricted diffusion in the inferior left parietal lobe, the superior lateral  left frontal lobe including motor strip extending into the occipital lobe and  occipital periventricular white matter consistent with acute infarction. No  hemorrhage or mass effect. Correlating very low level increased signal FLAIR and  T2 with this acute infarction.     The susceptibility weighted scan shows no evidence of acute or chronic  hemorrhage or abnormal venous anatomy. No asymmetry of venous signal in the left  hemisphere.     Brain parenchyma: Bilateral thalamic areas of chronic lacunar infarction with 2  lesions on the left and one on the right are noted. Small chronic lacunar  infarction left putamen/external capsule. Patchy ischemic changes in the  periventricular deep and mildly in the peripheral subcortical white matter of  both hemispheres are noted. Tiny chronic infarction superior right cerebellar  hemisphere. No mass or mass effect.     Ventricles and sulci: Mild cortical atrophy most prominent at the vertex and  perisylvian region. Ventricular system within normal limits.     Extra axial: No extra axial fluid collection or mass.     Brain vasculature: Normal, no arterial vascular abnormality is noted.     Craniocervical Junction: Normal.     Extracranial and skull base:  Bilateral cataract surgery. Mild mucosal  thickening in the paranasal sinuses.     IMPRESSION  Impression:     1. Multifocal pattern of acute infarction in the left hemisphere, primarily MCA  territory, likely embolic without hemorrhage or significant mass effect. 2. Bilateral thalamic and left basal ganglia chronic lacunar infarctions. Small  chronic infarction right cerebellar hemisphere.   3. Atrophy as described with additional white matter abnormality that is  nonspecific but likely ischemic. EXAM: CTA HEAD AND NECK     INDICATION: Difficulty speaking, slurred speech, multifocal acute left  hemispheric infarct     COMPARISON: No prior studies     TECHNIQUE:  Multiple axial CT images of the neck were obtained extending from  the level of the aortic arch to the skull base after the administration of the  IV contrast utilizing a CTA protocol. Maximum intensity projection  reconstructions were performed in multiple planes. Multiple axial CT images of the head were obtained extending from below the  level of the skull base to the vertex after the administration of the IV  contrast utilizing a CTA protocol. Maximum intensity projection reconstructions  were performed in three planes. Additional 3 D reconstructions were performed  at a separate workstation. One or more dose reduction techniques were used on this CT: automated exposure  control, adjustment of the mAs and/or kVp according to patient's size, and  iterative reconstruction techniques. The specific techniques utilized on this CT  exam have been documented in the patient's electronic medical record.     ___________________     FINDINGS:     CTA NECK     Mild atherosclerotic calcification present along the aortic arch and the  proximal great vessels. Normal variant branching pattern is present with  aberrant right subclavian artery which has a retroesophageal course.     The left common carotid is mildly irregular. The left carotid bifurcation is  mildly irregular. Estimated minimal luminal diameter proximal left ICA 3.8 mm. Estimated luminal diameter of normal left ICA cervical segment is 5.6 mm. Estimated degree of stenosis of the left ICA based upon NASCET criteria is 32%. Remainder of left ICA cervical segment is unremarkable.     The right common carotid is mildly irregular. The right carotid bifurcation is  mildly irregular. Estimated minimal luminal diameter proximal right ICA 5.6 mm. Estimated luminal diameter of normal right ICA cervical segment is 5.6 mm. Estimated degree of stenosis of the right ICA based upon NASCET criteria is 0%. Mild kinking is present in the distal right ICA without focal stenosis.     Vertebral arteries are relatively equal in size. No high-grade vertebral artery  stenosis is seen, only slight irregularity.     Degenerative changes are seen in the spine. Visualized lung apices are clear. The bilateral lenses are absent, likely due to prior cataract surgery.     CTA HEAD     There is no evidence of aneurysm. Very mild irregularity is seen in the  bilateral carotid siphons without high grade stenosis. The carotid terminus is  unremarkable.      A1 segments are relatively equal in size. There is mild multifocal narrowing  present in the left URBANO branches, particularly pericallosal. An anterior  communicating artery is present.      There is significant irregularity and narrowing present involving the left MCA. Severe irregular stenosis of the distal left M1 segment is seen immediately  proximal to the bifurcation itself, well seen sagittal MIP image 104 and coronal  MIP image 88. Additional irregularity is seen more distally in left MCA branches  involving the superior and inferior divisions. Irregularity is also seen  involving the left anterior temporal artery origin.     Very mild irregularity is present involving the right MCA bifurcation. No  high-grade right MCA stenosis is seen.     The vertebral arteries are relatively equal in size and show mild  atherosclerotic irregularity intracranially. [place] PICA origins are unremarkable. The  basilar artery is unremarkable. Moderate to severe stenosis is present at the  left P1/P2 junction with mild irregularity seen more distally in both PCA.  A  large caliber left-sided PCOM is present representing fetal origin type PCA;  moderate narrowing of this left-sided PCOM is seen posteriorly. The dural venous sinuses are patent.      ___________________     IMPRESSION  IMPRESSION:     1. No definite large vessel occlusion. Severe irregular left MCA stenosis along  the distal M1 segment immediately proximal to the bifurcation. This stenosis is  nonspecific perhaps atherosclerotic in origin; local dissection is possible  given that stenosis is quite irregular with slightly unusual morphology.     2. Additional sites of intracranial stenosis including moderate to severe left  P1/P2 junction, moderate left PCOM, mild bilateral PCA, and mild left URBANO. These  are nonspecific but presumably atherosclerotic in origin.     3. Mild proximal ICA irregularity without hemodynamically significant stenosis,  based on NASCET criteria.      4. No high-grade vertebral artery stenosis.     5. Normal variant aberrant right subclavian artery. EXAM: CT head     INDICATION: Slurred speech. CODE S.     COMPARISON: None.     TECHNIQUE: Axial CT imaging of the head was performed without intravenous  contrast. One or more dose reduction techniques were used on this CT: automated  exposure control, adjustment of the mAs and/or kVp according to patient size,  and iterative reconstruction techniques. The specific techniques used on this  CT exam have been documented in the patient's electronic medical record.     _______________     FINDINGS:     BRAIN:   Mild cortical atrophy most prominent in the frontal region and at the  vertex. There is no intracranial hemorrhage, mass effect, or midline shift. Ill-defined white matter hypodensity in the bilateral parieto-occipital  periventricular and subcortical white matter is nonspecific but is likely  ischemic.  No hypodensity in cortex would be consistent with an acute cortical  infarction.     EXTRA-AXIAL SPACES AND MENINGES: There are no abnormal extra-axial fluid  collections or mass.     CALVARIUM: Intact.     OTHER: There is some mild tortuosity of the cerebral vasculature with mild  elongation of the vertebrobasilar system.     _______________     IMPRESSION  IMPRESSION:        1. No acute intracranial abnormalities. No hemorrhage, mass effect or CT evident  acute cortical infarction. 2. Mild cortical atrophy and ischemic white matter change. No other significant  intracranial vascular abnormality is appreciated.     Critical result called to Dr. Nicole Gallegos at 2:00 PM on 10/22/2018. This result was  acknowledged and understood. DISCHARGE MEDICATIONS:   Discharge Medication List as of 10/25/2018  2:47 PM      START taking these medications    Details   aspirin (ASPIRIN) 325 mg tablet Take 1 Tab by mouth daily. , Print, Disp-30 Tab, R-0      atorvastatin (LIPITOR) 40 mg tablet Take 1 Tab by mouth nightly. , Print, Disp-30 Tab, R-0      clopidogrel (PLAVIX) 75 mg tab Take 1 Tab by mouth daily. , Print, Disp-21 Tab, R-0      fenofibrate nanocrystallized (TRICOR) 48 mg tablet Take 1 Tab by mouth daily. , Print, Disp-30 Tab, R-0      potassium chloride SR (K-TAB) 20 mEq tablet Take 2 Tabs by mouth daily. , Print, Disp-7 Tab, R-0      tamsulosin (FLOMAX) 0.4 mg capsule Take 1 Cap by mouth daily. , Print, Disp-30 Cap, R-0         CONTINUE these medications which have NOT CHANGED    Details   cyclobenzaprine (FLEXERIL) 10 mg tablet 10 mg., Historical Med      HYDROcodone-acetaminophen (NORCO) 5-325 mg per tablet Take 1 Tab by Mouth Every 6 Hours As Needed for Pain., Historical Med      MULTIVITAMIN PO Take  by Mouth Once a Day., Historical Med      sucralfate (CARAFATE) 1 gram tablet 1 g., Historical Med      OTHER,NON-FORMULARY, Please dispense a 5 ml solution of Trimix consisting of PGE 20 mcg/Papaverine 30 mg/Phentolamine 2 mg per ml. Patient is to inject 0.4-0.5 ml., Print, Disp-10 Each, R-11      Syringe with Needle,Disp, Tray (ALLERGIST TRAY 1CC 27GX1/2\") 1 mL 27 x 1/2\" tray Please dispense 25 each/one tray BD brand allergy syringes.   To be used as directed., Normal, Disp-25 Each, R-2      cholecalciferol (VITAMIN D3) 1,000 unit tablet Take 2 Tabs by mouth daily. , Print, Disp-60 Tab, R-0      clonazePAM (KLONOPIN) 0.5 mg tablet Take 1 Tab by mouth two (2) times daily as needed. , Print, Disp-60 Tab, R-0      cloNIDine (CATAPRES) 0.1 mg tablet Take 1 Tab by mouth two (2) times a day., Print, Disp-100 Tab, R-0      doxepin (SINEQUAN) 25 mg capsule Take 1 Cap by mouth nightly. , Print, Disp-60 Cap, R-0      metoprolol (LOPRESSOR) 100 mg tablet Take 1 Tab by mouth two (2) times a day., Normal, Disp-60 Tab, R-0      terazosin (HYTRIN) 10 mg capsule Take 1 Cap by mouth nightly. , Print, Disp-60 Cap, R-0      amLODIPine (NORVASC) 5 mg tablet Take 1 Tab by mouth daily. , Print, Disp-30 Tab, R-0      oxyCODONE IR (ROXICODONE) 5 mg immediate release tablet Take 1 Tab by mouth every four (4) hours as needed. , Print, Disp-100 Tab, R-0      zolpidem (AMBIEN) 5 mg tablet Take 1 Tab by mouth nightly as needed for Sleep., Print, Disp-30 Tab, R-0      Omeprazole delayed release (PRILOSEC D/R) 20 mg tablet Take 20 mg by mouth daily. , Historical Med             Condition at discharge:  Afrebrile  Ambulating  Eating, Drinking, Voiding  Stable    FOLLOW UP APPOINTMENTS:    Follow-up Information     Follow up With Specialties Details Why Contact Info    Roberto Steiner MD Internal Medicine   [facility] [address]  [facility] [phone]             Disposition:  Home       Total discharge preparation time was > 30  minutes.

## 2018-10-25 NOTE — DISCHARGE INSTRUCTIONS
DISCHARGE SUMMARY from Nurse    PATIENT INSTRUCTIONS:    After general anesthesia or intravenous sedation, for 24 hours or while taking prescription Narcotics:  · Limit your activities  · Do not drive and operate hazardous machinery  · Do not make important personal or business decisions  · Do  not drink alcoholic beverages  · If you have not urinated within 8 hours after discharge, please contact your surgeon on call. Report the following to your surgeon:  · Excessive pain, swelling, redness or odor of or around the surgical area  · Temperature over 100.5  · Nausea and vomiting lasting longer than 4 hours or if unable to take medications  · Any signs of decreased circulation or nerve impairment to extremity: change in color, persistent  numbness, tingling, coldness or increase pain  · Any questions    What to do at Home:  Recommended activity: Activity as tolerated,     If you experience any of the following symptoms as listed in your teachings of Stroke, please follow up with your primary care provider and or call 911. *  Please give a list of your current medications to your Primary Care Provider. *  Please update this list whenever your medications are discontinued, doses are      changed, or new medications (including over-the-counter products) are added. *  Please carry medication information at all times in case of emergency situations. These are general instructions for a healthy lifestyle:    No smoking/ No tobacco products/ Avoid exposure to second hand smoke  Surgeon General's Warning:  Quitting smoking now greatly reduces serious risk to your health.     Obesity, smoking, and sedentary lifestyle greatly increases your risk for illness    A healthy diet, regular physical exercise & weight monitoring are important for maintaining a healthy lifestyle    You may be retaining fluid if you have a history of heart failure or if you experience any of the following symptoms:  Weight gain of 3 pounds or more overnight or 5 pounds in a week, increased swelling in our hands or feet or shortness of breath while lying flat in bed. Please call your doctor as soon as you notice any of these symptoms; do not wait until your next office visit. Recognize signs and symptoms of STROKE:    F-face looks uneven    A-arms unable to move or move unevenly    S-speech slurred or non-existent    T-time-call 911 as soon as signs and symptoms begin-DO NOT go       Back to bed or wait to see if you get better-TIME IS BRAIN. Warning Signs of HEART ATTACK     Call 911 if you have these symptoms:   Chest discomfort. Most heart attacks involve discomfort in the center of the chest that lasts more than a few minutes, or that goes away and comes back. It can feel like uncomfortable pressure, squeezing, fullness, or pain.  Discomfort in other areas of the upper body. Symptoms can include pain or discomfort in one or both arms, the back, neck, jaw, or stomach.  Shortness of breath with or without chest discomfort.  Other signs may include breaking out in a cold sweat, nausea, or lightheadedness. Don't wait more than five minutes to call 911 - MINUTES MATTER! Fast action can save your life. Calling 911 is almost always the fastest way to get lifesaving treatment. Emergency Medical Services staff can begin treatment when they arrive -- up to an hour sooner than if someone gets to the hospital by car. The discharge information has been reviewed with the patient. The patient verbalized understanding. Discharge medications reviewed with the patient and appropriate educational materials and side effects teaching were provided. Patient armband removed and shredded. ___________________________________________________________________________________________________________________________________      Stroke: After Your Visit     Your Care Instructions     You have had a stroke.   Risk factors for stroke include being overweight, smoking, and sedentary lifestyle. This means that the blood flow to a part of your brain was blocked for some time, which damages the nerve cells in that part of the brain. The part of your body controlled by that part of your brain may not function properly now. The brain is an amazing organ that can heal itself to some degree. The stroke you had damaged part of your brain, but other parts of your brain may take over in some way for the damaged areas. You have already started this process. Going home may be hard for you and your family. The more you can try to do for yourself, the better. Remember to take each day one at a time. Follow-up care is a key part of your treatment and safety. Be sure to make and go to all appointments, and call your doctor if you are having problems. Its also a good idea to know your test results and keep a list of the medicines you take. How can you care for yourself at home? Enter a stroke rehabilitation (rehab) program, if your doctor recommends it. Physical, speech, and occupational therapies can help you manage bathing, dressing, eating, and other basics of daily living. Eat a heart-healthy diet that is low in cholesterol, saturated fat, and salt. Eat lots of fresh fruits and vegetables and foods high in fiber. Increase your activities slowly. Take short rest breaks when you get tired. Gradually increase the amount you walk. Start out by walking a little more than you did the day before. Do not drive until your doctor says it is okay. It is normal to feel sad or depressed after a stroke. If the blues last, talk to your doctor. If you are having problems with urine leakage, go to the bathroom at regular times, including when you first wake up and at bedtime. Also, limit fluids after dinner. If you are constipated, drink plenty of fluids, enough so that your urine is light yellow or clear like water.  If you have kidney, heart, or liver disease and have to limit fluids, talk with your doctor before you increase the amount of fluids you drink. Set up a regular time for using the toilet. If you continue to have constipation, your doctor may suggest using a bulking agent, such as Metamucil, or a stool softener, laxative, or enema. Medicines  Take your medicines exactly as prescribed. Call your doctor if you think you are having a problem with your medicine. You may be taking several medicines. ACE (angiotensin-converting enzyme) inhibitors, angiotensin II receptor blockers (ARBs), beta-blockers, diuretics (water pills), and calcium channel blockers control your blood pressure. Statins help lower cholesterol. Your doctor may also prescribe medicines for depression, pain, sleep problems, anxiety, or agitation. If your doctor has given you medicine that prevents blood clots, such as warfarin (Coumadin), aspirin combined with extended-release dipyridamole (Aggrenox), clopidogrel (Plavix), or aspirin to prevent another stroke, you should:  Tell your dentist, pharmacist, and other health professionals that you take these medicines. Watch for unusual bruising or bleeding, such as blood in your urine, red or black stools, or bleeding from your nose or gums. Get regular blood tests to check your clotting time if you are taking Coumadin. Wear medical alert jewelry that says you take blood thinners. You can buy this at most drugstores. Do not take any over-the-counter medicines or herbal products without talking to your doctor first.  If you take birth control pills or hormone replacement therapy, talk to your doctor about whether they are right for you. For family members and caregivers  Make the home safe. Set up a room so that your loved one does not have to climb stairs. Be sure the bathroom is on the same floor. Move throw rugs and furniture that could cause falls, and make sure that the lighting is good. Put grab bars and seats in tubs and showers.   Find out what your loved one can do and what he or she needs help with. Try not to do things for your loved one that your loved one can do on his or her own. Help him or her learn and practice new skills. Visit and talk with your loved one often. Try doing activities together that you both enjoy, such as playing cards or board games. Keep in touch with your loved one's friends as much as you can, and encourage them to visit. Take care of yourself. Do not try to do everything yourself. Ask other family members to help. Eat well, get enough rest, and take time to do things that you enjoy. Keep up with your own doctor visits, and make sure to take your medicines regularly. Get out of the house as much as you can. Join a local support group. Find out if you qualify for home health care visits to help with rehab or for adult day care. When should you call for help? Call 911 anytime you think you may need emergency care. For example, call if:  You have signs of another stroke. These may include:  Sudden numbness, paralysis, or weakness in your face, arm, or leg, especially on only one side of your body. New problems with walking or balance. Sudden vision changes. Drooling or slurred speech. New problems speaking or understanding simple statements, or you feel confused. A sudden, severe headache that is different from past headaches. Call 911 even if these symptoms go away in a few minutes. You cough up blood. You vomit blood or what looks like coffee grounds. You pass maroon or very bloody stools. Call your doctor now or seek immediate medical care if:  You have new bruises or blood spots under your skin. You have a nosebleed. Your gums bleed when you brush your teeth. You have blood in your urine. Your stools are black and tarlike or have streaks of blood. You have vaginal bleeding when you are not having your period, or heavy period bleeding.   You have new symptoms that may be related to your stroke, such as falls or trouble swallowing. Watch closely for changes in your health, and be sure to contact your doctor if you have any problems. Where can you learn more? Go to [employer]    Enter C294  in the search box to learn more about \"Stroke: After Your Visit\". © 6501-6977 [employer]. Care instructions adapted under license by [facility] (which disclaims liability or warranty for this information). This care instruction is for use with your licensed healthcare professional. If you have questions about a medical condition or this instruction, always ask your healthcare professional. Marlise Night any warranty or liability for your use of this information.

## 2018-10-25 NOTE — PROGRESS NOTES
Neurology Progress Note    Admit Date: 10/22/2018  Length of Stay: 3  Primary Care: Adelene Schaumann, MD   Principle Problem: Cerebrovascular accident (CVA) due to embolism of left middle cerebral artery [facility])     Assessment:    AIS     Plan:    AIS:  ASA and Plavix for three weeks per POINT trial and the just ASA starting November 12th. Permissive HTN for next 48 hours and then gentle lowering with goal <130/80. LDL 47.4 but triglycerides 168. Atorvastatin in place. Nutrition consult. ECHO with 50% EF with no shunt. From distribution of stroke, may be cardioembolic or thrombo-embolic from LMCA. Has been SR with multifocal PVCs on telemetry. Would benefit from outpatient loop recorder to r/o PAFib. Apnea link study. This can be performed outpatient through [facility] as already has appointment. Ok to be DCD from neurology standpoint with above treatment. Getting potassium replaced this morning and cardiology consult then to be DCD per IM. Interim History:  CTA shows irregularity of LMCA, mod-severe stenosis of LP1-P2, and mod stenosis of LPCOM. Complaints of frequent urination without complete emptying. History: Karen Conteh is an 81yo  male with PMH of HTN, CKD, DLD, BPH, GERD, gout, arthritis, and possible sleep apnea who presented with slurred speech and right facial droop. He called EMS and soon after arriving to the ED his speech went back to baseline. He denies any extremity weakness or word finding issues during the event. Code S protocol was followed and he was seen by teleneurology. Alteplase was not given as he returned to baseline. He has an appointment at [facility] 10/30 for a sleep study. He states that he has has difficulty sleeping. He sleeps about 4-5 hours a night and is tired during the day. Unaware if he snores. 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       Results reviewed:     CT Results (most recent):  Results from [facility] encounter on 10/22/18   CTA HEAD NECK W CONT    Narrative EXAM: CTA HEAD AND NECK    INDICATION: Difficulty speaking, slurred speech, multifocal acute left  hemispheric infarct    COMPARISON: No prior studies    TECHNIQUE:  Multiple axial CT images of the neck were obtained extending from  the level of the aortic arch to the skull base after the administration of the  IV contrast utilizing a CTA protocol. Maximum intensity projection  reconstructions were performed in multiple planes. Multiple axial CT images of the head were obtained extending from below the  level of the skull base to the vertex after the administration of the IV  contrast utilizing a CTA protocol. Maximum intensity projection reconstructions  were performed in three planes. Additional 3 D reconstructions were performed  at a separate workstation. One or more dose reduction techniques were used on this CT: automated exposure  control, adjustment of the mAs and/or kVp according to patient's size, and  iterative reconstruction techniques. The specific techniques utilized on this CT  exam have been documented in the patient's electronic medical record.    ___________________    FINDINGS:    CTA NECK    Mild atherosclerotic calcification present along the aortic arch and the  proximal great vessels. Normal variant branching pattern is present with  aberrant right subclavian artery which has a retroesophageal course. The left common carotid is mildly irregular. The left carotid bifurcation is  mildly irregular. Estimated minimal luminal diameter proximal left ICA 3.8 mm. Estimated luminal diameter of normal left ICA cervical segment is 5.6 mm. Estimated degree of stenosis of the left ICA based upon NASCET criteria is 32%. Remainder of left ICA cervical segment is unremarkable. The right common carotid is mildly irregular. The right carotid bifurcation is  mildly irregular.  Estimated minimal luminal diameter proximal right ICA 5.6 mm. Estimated luminal diameter of normal right ICA cervical segment is 5.6 mm. Estimated degree of stenosis of the right ICA based upon NASCET criteria is 0%. Mild kinking is present in the distal right ICA without focal stenosis. Vertebral arteries are relatively equal in size. No high-grade vertebral artery  stenosis is seen, only slight irregularity. Degenerative changes are seen in the spine. Visualized lung apices are clear. The bilateral lenses are absent, likely due to prior cataract surgery. CTA HEAD    There is no evidence of aneurysm. Very mild irregularity is seen in the  bilateral carotid siphons without high grade stenosis. The carotid terminus is  unremarkable. A1 segments are relatively equal in size. There is mild multifocal narrowing  present in the left URBANO branches, particularly pericallosal. An anterior  communicating artery is present. There is significant irregularity and narrowing present involving the left MCA. Severe irregular stenosis of the distal left M1 segment is seen immediately  proximal to the bifurcation itself, well seen sagittal MIP image 104 and coronal  MIP image 88. Additional irregularity is seen more distally in left MCA branches  involving the superior and inferior divisions. Irregularity is also seen  involving the left anterior temporal artery origin. Very mild irregularity is present involving the right MCA bifurcation. No  high-grade right MCA stenosis is seen. The vertebral arteries are relatively equal in size and show mild  atherosclerotic irregularity intracranially. Loul[place] Mike PICA origins are unremarkable. The  basilar artery is unremarkable. Moderate to severe stenosis is present at the  left P1/P2 junction with mild irregularity seen more distally in both PCA.  A  large caliber left-sided PCOM is present representing fetal origin type PCA;  moderate narrowing of this left-sided PCOM is seen posteriorly. The dural venous sinuses are patent.     ___________________      Impression IMPRESSION:    1. No definite large vessel occlusion. Severe irregular left MCA stenosis along  the distal M1 segment immediately proximal to the bifurcation. This stenosis is  nonspecific perhaps atherosclerotic in origin; local dissection is possible  given that stenosis is quite irregular with slightly unusual morphology. 2. Additional sites of intracranial stenosis including moderate to severe left  P1/P2 junction, moderate left PCOM, mild bilateral PCA, and mild left URBANO. These  are nonspecific but presumably atherosclerotic in origin. 3. Mild proximal ICA irregularity without hemodynamically significant stenosis,  based on NASCET criteria. 4. No high-grade vertebral artery stenosis. 5. Normal variant aberrant right subclavian artery. MRI Results (most recent):  Results from [place] encounter on 10/22/18   MRI BRAIN WO CONT    Narrative Brain MR without contrast:    Indication: Difficulty speaking, slurred speech. Concern for hemorrhage or acute  infarction. Procedure: Sagittal spin echo T1, axial FSE FLAIR and T2 and axial diffusion  weighted scanning was performed. A susceptibility weighted (SWI) sequence was  performed, quite sensitive for hemosiderin, calcification and venous anatomy. Coronal FSE T2 images were also performed. No contrast was administered. Comparison exam: Head CT earlier today. Findings: Diffusion: There is a multifocal pattern of cortical and subcortical  restricted diffusion in the inferior left parietal lobe, the superior lateral  left frontal lobe including motor strip extending into the occipital lobe and  occipital periventricular white matter consistent with acute infarction. No  hemorrhage or mass effect. Correlating very low level increased signal FLAIR and  T2 with this acute infarction.     The susceptibility weighted scan shows no evidence of acute or chronic  hemorrhage or abnormal venous anatomy. No asymmetry of venous signal in the left  hemisphere. Brain parenchyma: Bilateral thalamic areas of chronic lacunar infarction with 2  lesions on the left and one on the right are noted. Small chronic lacunar  infarction left putamen/external capsule. Patchy ischemic changes in the  periventricular deep and mildly in the peripheral subcortical white matter of  both hemispheres are noted. Tiny chronic infarction superior right cerebellar  hemisphere. No mass or mass effect. Ventricles and sulci: Mild cortical atrophy most prominent at the vertex and  perisylvian region. Ventricular system within normal limits. Extra axial: No extra axial fluid collection or mass. Brain vasculature: Normal, no arterial vascular abnormality is noted. Craniocervical Junction: Normal.    Extracranial and skull base:  Bilateral cataract surgery. Mild mucosal  thickening in the paranasal sinuses. Impression Impression:    1. Multifocal pattern of acute infarction in the left hemisphere, primarily MCA  territory, likely embolic without hemorrhage or significant mass effect. 2. Bilateral thalamic and left basal ganglia chronic lacunar infarctions. Small  chronic infarction right cerebellar hemisphere. 3. Atrophy as described with additional white matter abnormality that is  nonspecific but likely ischemic.        Latest Hemoglobin A1C:  Lab Results   Component Value Date/Time    Hemoglobin A1c 5.6 10/22/2018 01:55 PM       Latest Cardiology Procedure:  Results for orders placed or performed during the hospital encounter of 10/22/18   EKG, 12 LEAD, INITIAL   Result Value Ref Range    Ventricular Rate 58 BPM    Atrial Rate 58 BPM    P-R Interval 164 ms    QRS Duration 102 ms    Q-T Interval 414 ms    QTC Calculation (Bezet) 406 ms    Calculated P Axis -4 degrees    Calculated R Axis -3 degrees    Calculated T Axis 41 degrees    Diagnosis       Sinus bradycardia with occasional premature ventricular complexes  Otherwise normal ECG  When compared with ECG of 05-DEC-2017 15:30,  premature ventricular complexes are now present  Confirmed by Marta Franklin ([id number]) on 10/22/2018 3:47:32 PM         Important Labs:  No results found for: FOL, RBCF  Lab Results   Component Value Date/Time    Cholesterol, total 133 10/22/2018 01:55 PM    Cholesterol, total 122 10/22/2018 01:55 PM    HDL Cholesterol 44 10/22/2018 01:55 PM    HDL Cholesterol 41 10/22/2018 01:55 PM    LDL, calculated 52 10/22/2018 01:55 PM    LDL, calculated 47.4 10/22/2018 01:55 PM    VLDL, calculated 37 10/22/2018 01:55 PM    VLDL, calculated 33.6 10/22/2018 01:55 PM    Triglyceride 185 (H) 10/22/2018 01:55 PM    Triglyceride 168 (H) 10/22/2018 01:55 PM    CHOL/HDL Ratio 3.0 10/22/2018 01:55 PM    CHOL/HDL Ratio 3.0 10/22/2018 01:55 PM     Lab Results   Component Value Date/Time    TSH 1.40 10/22/2018 01:55 PM    Triiodothyronine (T3), free 2.4 10/22/2018 01:55 PM    T4, Free 1.0 10/22/2018 01:55 PM     No results found for: DS35, PHEN, PHENO, PHENT, DILF, DS39, PHENY, PTN, VALF2, VALAC, VALP, VALPR, DS6, CRBAM, CRBAMP, CARB2, XCRBAM  Discussed with: patient and Dr. Savita De La Cruz    Allergies: No Known Allergies   Review of Systems:    Y  N   Constitutional: [] [x] recent weight change  [] [x] fever  [x] [] sleep difficulties   ENT/Mouth:  [x] [] hearing loss  [] [x] swallowing problems  [x] [] slurred speech   Cardiovascular:  [] [x] chest pain   [] [x] palpitations    Respiratory: [] [x] cough with swallow  [] [x] shortness of breath  [x] [] sleep apnea  [] [] intubated   Gastrointestinal: [] [x] abdominal pain  [] [x] nausea   Genitourinary: [x] [] frequent urination  [] [] incontinence    Musculoskeletal:   [] [x] joint pain  [] [x] muscle pain   Integument:   [] [] rash/itching   Neurological:  [] [] dizziness/vertigo  [] [] sedation  [] [x] confusion  [] [x] agitation/combativeness  [] [] loss of consciousness  [] [x] numbness/tingling sensation  [] [x] tremors  [] [x] weakness in limbs  [] [x] difficulty with balance  [] [x] frequent or recurring headaches  [] [x] memory loss   [] [] comatose  [] [x] seizures   Psychiatric:   [] [x] depression  [] [] hallucinations   Endocrine: [] [] excessive thirst or urination   [] [] heat or cold intolerance   Hematologic/Lymphatic: [] [] bleeding tendency  [] [] enlarged lymph nodes     PMH:   Past Medical History:   Diagnosis Date    Alcohol dependence, episodic drinking behavior ([facility] [address]     Other and Unspecified    Anxiety state     Arthritis     BPH with obstruction/lower urinary tract symptoms     Carpal tunnel syndrome     Chronic kidney disease, stage III (moderate) (HCC)     Dyslipidemia     Elevated PSA 2011    Esophageal reflux     Essential hypertension, benign     Exercise tolerance finding July 2015    GERD (gastroesophageal reflux disease)     Gouty arthropathy     History of blood transfusion     Hypopotassemia     Hyposmolality and/or hyponatremia     Irritable bowel syndrome     Knee pain, left     Lumbago     Lumbar back pain     S/P TKR (total knee replacement) 2013    Scoliosis deformity of spine     Sleep apnea         Problem List: Principal Problem:    Cerebrovascular accident (CVA) due to embolism of left middle cerebral artery ([facility] [address] (10/22/2018)    Active Problems:    BPH (benign prostatic hyperplasia) (11/18/2013)      Essential hypertension, benign (11/18/2013)      TIA (transient ischemic attack) (10/22/2018)      Hypokalemia (10/25/2018)        FH:   Family History   Problem Relation Age of Onset    Hypertension Father     Diabetes Mother         SH:   Social History     Socioeconomic History    Marital status:      Spouse name: Not on file    Number of children: Not on file    Years of education: Not on file    Highest education level: Not on file   Social Needs    Financial resource strain: Not on file    Food insecurity - worry: Not on file    Food insecurity - inability: Not on file    Transportation needs - medical: Not on file   Timeet needs - non-medical: Not on file   Occupational History    Not on file   Tobacco Use    Smoking status: Former Smoker    Smokeless tobacco: Never Used   Substance and Sexual Activity    Alcohol use: Yes     Alcohol/week: 1.8 oz     Types: 3 Cans of beer per week    Drug use: No    Sexual activity: Not on file   Other Topics Concern    Not on file   Social History Narrative    Not on file          Vital Signs:   Visit Vitals  BP (!) 165/91 (BP 1 Location: Left arm, BP Patient Position: At rest)   Pulse (!) 108   Temp 98.2 °F (36.8 °C)   Resp 18   Ht 5' 7\" (1.702 m)   Wt 81.6 kg (180 lb)   SpO2 96%   BMI 28.19 kg/m²      Neurological examination:    Appearance: In no acute distress, well developed, well nourished, cooperative   Cardiovascular: Heart is regular rate and rhythm, peripheral edema is absent.  Mental status examination: Alert and oriented X 4. Normal speech and language. Normal attention, memory and fund of knowledge.  Cranial Nerves:     I: smell Not tested   II: visual fields Visual fields were intact to confrontation to LLQ and OD. Eye movements were full and conjugate, saccades were accurate, pursuit movements were smooth, and there was no nystagmus. Blurred to OS   II: pupils Equal, round, reactive to light on right. Left irregular and non reactive. III,VII: ptosis none   III,IV,VI: extraocular muscles  Full ROM   V: facial light touch sensation  normal   V,VII: corneal reflex     VII: facial muscle function  Left naso labial fold flattening   VIII: hearing    IX: soft palate elevation  Normal. The palate and tongue moved in the midline.      IX,X: gag reflex present   XI: sternocleidomastoid strength 5/5   XII: tongue strength  Normal.          Motor exam: Muscle tone, bulk and manual muscle testing: normal.               Medications:      [x] REVIEWED  Current Facility-Administered Medications   Medication    potassium chloride SR (KLOR-CON 10) tablet 40 mEq    folic acid (FOLVITE) tablet 1 mg    Thiamine Mononitrate (B-1) tablet 100 mg    fenofibrate nanocrystallized (TRICOR) tablet 48 mg    tamsulosin (FLOMAX) capsule 0.4 mg    LORazepam (ATIVAN) tablet 1 mg    LORazepam (ATIVAN) tablet 1 mg    Or    LORazepam (ATIVAN) injection 1 mg    LORazepam (ATIVAN) tablet 2 mg    Or    LORazepam (ATIVAN) injection 2 mg    LORazepam (ATIVAN) injection 3 mg    clopidogrel (PLAVIX) tablet 75 mg    atorvastatin (LIPITOR) tablet 40 mg    ondansetron (ZOFRAN) injection 2 mg    aspirin tablet 325 mg    acetaminophen (TYLENOL) tablet 650 mg    acetaminophen (TYLENOL) suppository 650 mg    senna-docusate (PERICOLACE) 8.6-50 mg per tablet 2 Tab    albuterol (PROVENTIL VENTOLIN) nebulizer solution 2.5 mg    heparin (porcine) injection 5,000 Units     Data:      [x] REVIEWED  Recent Results (from the past 24 hour(s))   URINALYSIS W/MICROSCOPIC    Collection Time: 10/24/18 12:30 PM   Result Value Ref Range    Color YELLOW      Appearance CLEAR      Specific gravity 1.014 1.005 - 1.030      pH (UA) 6.5 5.0 - 8.0      Protein TRACE (A) NEG mg/dL    Glucose NEGATIVE  NEG mg/dL    Ketone TRACE (A) NEG mg/dL    Bilirubin NEGATIVE  NEG      Blood NEGATIVE  NEG      Urobilinogen 0.2 0.2 - 1.0 EU/dL    Nitrites NEGATIVE  NEG      Leukocyte Esterase NEGATIVE  NEG      WBC 0 to 1 0 - 4 /hpf    RBC NEGATIVE  0 - 5 /hpf    Epithelial cells FEW 0 - 5 /lpf    Bacteria NEGATIVE  NEG /hpf   ECHO ADULT COMPLETE    Collection Time: 10/24/18  2:20 PM   Result Value Ref Range    LVIDs 3.40 cm    Aortic Valve Area by Continuity of Peak Velocity 1.6 cm2    AoV PG 15.7 mmHg    LVIDd 4.50 4.2 - 5.9 cm    LVPWd 1.00 0.6 - 1.0 cm    IVSd 1.10 0.6 - 1.0 cm    LV ED Vol A2C 69.0 mL    LV ES Vol A4C 56.0 mL    LVOT d 2.0 cm    LVOT Peak Gradient 4.4 mmHg    MV E Yg 1.64 cm/s Aortic Valve Systolic Mean Gradient 27.6 mmHg    LA Vol 4C 45.0 18 - 58 mL    LA Vol 2C 103.0 18 - 58 mL    LA Area 2C 28.0 cm2    LA Area 4C 17.1 cm2    LV ES Vol A2C 45.0 mL    LV ED Vol A4C 99.0 mL    Mitral Valve E Wave Deceleration Time 29.0 ms    Tapse 2.90 1.5 - 2.0 cm    CHICHO/BSA 1.60     LA Vol Index 53.28 16 - 28 ml/m2    LA Vol Index 23.28 16 - 28 ml/m2    LVED Vol Index A4C 51.2 mL/m2    LVED Vol Index A2C 35.7 mL/m2    LVES Vol Index A4C 29.0 mL/m2    LVES Vol Index A2C 23.3 mL/m2    Ao Root D 3.60 cm    LV ES Vol BP 47.0 22 - 58 mL    LVES Vol Index BP 24.3 mL/m2    LV ED Vol BP 95.0 67 - 155 ml    LVED Vol Index BP 49.1 mL/m2   GLUCOSE, POC    Collection Time: 10/24/18  9:04 PM   Result Value Ref Range    Glucose (POC) 130 (H) 70 - 110 mg/dL   CBC W/O DIFF    Collection Time: 10/25/18  3:50 AM   Result Value Ref Range    WBC 6.1 4.6 - 13.2 K/uL    RBC 4.60 (L) 4.70 - 5.50 M/uL    HGB 14.3 13.0 - 16.0 g/dL    HCT 41.5 36.0 - 48.0 %    MCV 90.2 74.0 - 97.0 FL    MCH 31.1 24.0 - 34.0 PG    MCHC 34.5 31.0 - 37.0 g/dL    RDW 13.0 11.6 - 14.5 %    PLATELET 511 357 - 834 K/uL    MPV 10.9 9.2 - 38.3 FL   METABOLIC PANEL, BASIC    Collection Time: 10/25/18  3:50 AM   Result Value Ref Range    Sodium 143 136 - 145 mmol/L    Potassium 2.8 (LL) 3.5 - 5.5 mmol/L    Chloride 105 100 - 108 mmol/L    CO2 30 21 - 32 mmol/L    Anion gap 8 3.0 - 18 mmol/L    Glucose 103 (H) 74 - 99 mg/dL    BUN 8 7.0 - 18 MG/DL    Creatinine 1.03 0.6 - 1.3 MG/DL    BUN/Creatinine ratio 8 (L) 12 - 20      GFR est AA >60 >60 ml/min/1.73m2    GFR est non-AA >60 >60 ml/min/1.73m2    Calcium 8.1 (L) 8.5 - 10.1 MG/DL   GLUCOSE, POC    Collection Time: 10/25/18  7:53 AM   Result Value Ref Range    Glucose (POC) 110 70 - 110 mg/dL       Nataly Forward, NP

## 2018-10-25 NOTE — PROGRESS NOTES
Roselinet able to ambulate 441 feet with physical therapy. His discharge plan is to return home. Care Management Interventions  PCP Verified by CM: Yes(Dr. Yue Dos Santos, last seen June 2018)  Palliative Care Criteria Met (RRAT>21 & CHF Dx)?: No  Mode of Transport at Discharge:  Other (see comment)(family)  Transition of Care Consult (CM Consult): Discharge Planning  MyChart Signup: Yes  Discharge Durable Medical Equipment: No  Health Maintenance Reviewed: Yes  Physical Therapy Consult: Yes  Occupational Therapy Consult: Yes  Speech Therapy Consult: Yes  Current Support Network: Lives Alone  Confirm Follow Up Transport: Self  Plan discussed with Pt/Family/Caregiver: Yes  [address] Provided?: No  Discharge Location  Discharge Placement: Home   FOLLOW UP VISIT Appointment in: Other (Specify) Follow up with Cardiology for event monitor as scheduled Follow up with PCP X 1 week, Recheck BMP for K level Follow up with Urology call to make appointment Follow up with sleep lab as scheduled (Order #[id number]) on 10/25/18

## 2018-10-25 NOTE — PROGRESS NOTES
Assume care of patient from [address]. (Precepting) with Boubacar valdez RN with patient care. Patient received in bed awake. Patient A&Ox4, denies pain and discomfort. No distress noted. Frequently use items within reach. Head of bed elevated and bed locked in low position. Call bell within reach and patient verbalized understanding of use for assistance and needs. 18- This nurse informed Dr. Kelly Gonzalez that patient does not want to wait for potassium results or cardiac consult related to follow up information for discharge and wants to leave the hospital right now. Patient is aware that he needs cardiac consult and sleep study but was adamant about leaving now. Dr. Kelly Gonzalez stated that patient can leave and potassium results will be sent to him. 1515- Patient discharged home, accompanied by JAYLON Mason via of wheelchair to front entrance to car. No distress noted. Patient has discharge instructions, along with five prescriptions and belongings. Voiced understanding of discharge instructions.

## 2018-10-25 NOTE — PROGRESS NOTES
1920 Assumed care of pt from [facility]. Alert and oriented x 4. Report included SBAR, MAR, kardex. Frequent use items within reach. Bed locked in lowest position. Call bell within reach. Pt verbalizes understanding to call for assistance. 2330 Patient,s heart rate up to 126-130. Dr Joce Wright paged,Patient restless and agitated. MD ordered Ciwa protocol. Medicated with ativan. Encouraged to stay in bed and use urinal.  0040 Patient sleeping.  0500 awake,restless. 0515 Heart rate up to 160 when patient went to bathroom,now 134,sinus tach. Dr Joce Wright paged and made aware,no new orders. 0160 low K called to Dr Emmy Plascencia received. 0720 Bedside shift change report given to Candelaria Jackson [id number] (oncoming nurse) by St sandee HURST (offgoing nurse). Report included the following information SBAR, Kardex and MAR.

## 2018-10-26 LAB
ABO + RH BLD: NORMAL
ANTIGENS PRESENT RBC DONR: NORMAL
ANTIGENS PRESENT RBC DONR: NORMAL
BLD PROD TYP BPU: NORMAL
BLD PROD TYP BPU: NORMAL
BLOOD GROUP ANTIBODIES SERPL: NORMAL
BLOOD GROUP ANTIBODIES SERPL: NORMAL
BPU ID: NORMAL
BPU ID: NORMAL
CROSSMATCH RESULT,%XM: NORMAL
CROSSMATCH RESULT,%XM: NORMAL
SPECIMEN EXP DATE BLD: NORMAL
STATUS OF UNIT,%ST: NORMAL
STATUS OF UNIT,%ST: NORMAL
UNIT DIVISION, %UDIV: 0
UNIT DIVISION, %UDIV: 0

## 2018-10-30 NOTE — PROGRESS NOTES
Patient has consented to participate in a registry study -  MaRISS: Mild and Rapidly Improving Stroke Study. Informed Consent is signed and patient was provided a copy.

## 2021-03-10 ENCOUNTER — HOSPITAL ENCOUNTER (OUTPATIENT)
Dept: GENERAL RADIOLOGY | Age: 86
Discharge: HOME | DRG: 308 | End: 2021-03-10
Attending: EMERGENCY MEDICINE
Payer: MEDICARE

## 2021-03-10 ENCOUNTER — APPOINTMENT (OUTPATIENT)
Dept: CT IMAGING | Age: 86
DRG: 308 | End: 2021-03-10
Attending: EMERGENCY MEDICINE
Payer: MEDICARE

## 2021-03-10 ENCOUNTER — APPOINTMENT (OUTPATIENT)
Dept: NON INVASIVE DIAGNOSTICS | Age: 86
DRG: 308 | End: 2021-03-10
Attending: EMERGENCY MEDICINE
Payer: MEDICARE

## 2021-03-10 ENCOUNTER — HOSPITAL ENCOUNTER (INPATIENT)
Age: 86
LOS: 2 days | Discharge: HOME HEALTH | DRG: 308 | End: 2021-03-12
Attending: EMERGENCY MEDICINE | Admitting: INTERNAL MEDICINE
Payer: MEDICARE

## 2021-03-10 DIAGNOSIS — I48.91 ATRIAL FIBRILLATION WITH RAPID VENTRICULAR RESPONSE (HCC): Primary | ICD-10-CM

## 2021-03-10 DIAGNOSIS — J81.0 ACUTE PULMONARY EDEMA (HCC): ICD-10-CM

## 2021-03-10 DIAGNOSIS — I50.9 ACUTE CONGESTIVE HEART FAILURE, UNSPECIFIED HEART FAILURE TYPE (HCC): ICD-10-CM

## 2021-03-10 DIAGNOSIS — R07.9 CHEST PAIN: ICD-10-CM

## 2021-03-10 PROBLEM — I67.9 CEREBROVASCULAR DISEASE: Status: ACTIVE | Noted: 2021-03-10

## 2021-03-10 PROBLEM — E78.5 DYSLIPIDEMIA: Status: ACTIVE | Noted: 2021-03-10

## 2021-03-10 LAB
ALBUMIN SERPL-MCNC: 3.3 G/DL (ref 3.4–5)
ALBUMIN/GLOB SERPL: 1.2 {RATIO} (ref 0.8–1.7)
ALP SERPL-CCNC: 107 U/L (ref 45–117)
ALT SERPL-CCNC: 27 U/L (ref 16–61)
ANION GAP SERPL CALC-SCNC: 7 MMOL/L (ref 3–18)
APPEARANCE UR: CLEAR
APTT PPP: 32.6 SEC (ref 23–36.4)
AST SERPL-CCNC: 14 U/L (ref 10–38)
ATRIAL RATE: 85 BPM
BASOPHILS # BLD: 0 K/UL (ref 0–0.1)
BASOPHILS NFR BLD: 0 % (ref 0–2)
BILIRUB SERPL-MCNC: 1.2 MG/DL (ref 0.2–1)
BILIRUB UR QL: NEGATIVE
BNP SERPL-MCNC: 8581 PG/ML (ref 0–1800)
BUN SERPL-MCNC: 22 MG/DL (ref 7–18)
BUN/CREAT SERPL: 12 (ref 12–20)
CALCIUM SERPL-MCNC: 8.7 MG/DL (ref 8.5–10.1)
CALCULATED R AXIS, ECG10: 77 DEGREES
CALCULATED T AXIS, ECG11: -68 DEGREES
CHLORIDE SERPL-SCNC: 107 MMOL/L (ref 100–111)
CK MB CFR SERPL CALC: 4.5 % (ref 0–4)
CK MB SERPL-MCNC: 2.6 NG/ML (ref 5–25)
CK SERPL-CCNC: 58 U/L (ref 39–308)
CO2 SERPL-SCNC: 23 MMOL/L (ref 21–32)
COLOR UR: YELLOW
COVID-19 RAPID TEST, COVR: NOT DETECTED
CREAT SERPL-MCNC: 1.79 MG/DL (ref 0.6–1.3)
D DIMER PPP FEU-MCNC: 2.16 UG/ML(FEU)
DIAGNOSIS, 93000: NORMAL
DIFFERENTIAL METHOD BLD: ABNORMAL
ECHO AO ASC DIAM: 3.19 CM
ECHO AO ROOT DIAM: 3.47 CM
ECHO IVC PROX: 2.15 CM
ECHO LA MAJOR AXIS: 5.04 CM
ECHO LA MINOR AXIS: 2.61 CM
ECHO LV INTERNAL DIMENSION DIASTOLIC: 5.15 CM (ref 4.2–5.9)
ECHO LV INTERNAL DIMENSION SYSTOLIC: 4.48 CM
ECHO LV IVSD: 1.13 CM (ref 0.6–1)
ECHO LV MASS 2D: 181 G (ref 88–224)
ECHO LV MASS INDEX 2D: 93.6 G/M2 (ref 49–115)
ECHO LV POSTERIOR WALL DIASTOLIC: 0.79 CM (ref 0.6–1)
ECHO LVOT DIAM: 2.13 CM
ECHO PV REGURGITANT MAX VELOCITY: 265.74 CM/S
ECHO TV REGURGITANT MAX VELOCITY: 289 CM/S
ECHO TV REGURGITANT PEAK GRADIENT: 33.4 MMHG
EOSINOPHIL # BLD: 0.1 K/UL (ref 0–0.4)
EOSINOPHIL NFR BLD: 1 % (ref 0–5)
ERYTHROCYTE [DISTWIDTH] IN BLOOD BY AUTOMATED COUNT: 13.6 % (ref 11.6–14.5)
GLOBULIN SER CALC-MCNC: 2.8 G/DL (ref 2–4)
GLUCOSE SERPL-MCNC: 155 MG/DL (ref 74–99)
GLUCOSE UR STRIP.AUTO-MCNC: NEGATIVE MG/DL
HCT VFR BLD AUTO: 38.2 % (ref 36–48)
HGB BLD-MCNC: 12.2 G/DL (ref 13–16)
HGB UR QL STRIP: NEGATIVE
INR PPP: 1.1 (ref 0.8–1.2)
KETONES UR QL STRIP.AUTO: NEGATIVE MG/DL
LEUKOCYTE ESTERASE UR QL STRIP.AUTO: NEGATIVE
LYMPHOCYTES # BLD: 1.3 K/UL (ref 0.9–3.6)
LYMPHOCYTES NFR BLD: 15 % (ref 21–52)
MAGNESIUM SERPL-MCNC: 1.9 MG/DL (ref 1.6–2.6)
MCH RBC QN AUTO: 31.1 PG (ref 24–34)
MCHC RBC AUTO-ENTMCNC: 31.9 G/DL (ref 31–37)
MCV RBC AUTO: 97.4 FL (ref 74–97)
MONOCYTES # BLD: 0.7 K/UL (ref 0.05–1.2)
MONOCYTES NFR BLD: 8 % (ref 3–10)
NEUTS SEG # BLD: 6.8 K/UL (ref 1.8–8)
NEUTS SEG NFR BLD: 76 % (ref 40–73)
NITRITE UR QL STRIP.AUTO: NEGATIVE
PH UR STRIP: 5 [PH] (ref 5–8)
PLATELET # BLD AUTO: 223 K/UL (ref 135–420)
PMV BLD AUTO: 11.2 FL (ref 9.2–11.8)
POTASSIUM SERPL-SCNC: 4.3 MMOL/L (ref 3.5–5.5)
PROT SERPL-MCNC: 6.1 G/DL (ref 6.4–8.2)
PROT UR STRIP-MCNC: NEGATIVE MG/DL
PROTHROMBIN TIME: 14.4 SEC (ref 11.5–15.2)
Q-T INTERVAL, ECG07: 342 MS
QRS DURATION, ECG06: 146 MS
QTC CALCULATION (BEZET), ECG08: 513 MS
RBC # BLD AUTO: 3.92 M/UL (ref 4.7–5.5)
SARS-COV-2, COV2: NORMAL
SODIUM SERPL-SCNC: 137 MMOL/L (ref 136–145)
SOURCE, COVRS: NORMAL
SP GR UR REFRACTOMETRY: 1.02 (ref 1–1.03)
TROPONIN I SERPL-MCNC: <0.02 NG/ML (ref 0–0.04)
TSH SERPL DL<=0.05 MIU/L-ACNC: 1.3 UIU/ML (ref 0.36–3.74)
UROBILINOGEN UR QL STRIP.AUTO: 0.2 EU/DL (ref 0.2–1)
VENTRICULAR RATE, ECG03: 135 BPM
WBC # BLD AUTO: 8.9 K/UL (ref 4.6–13.2)

## 2021-03-10 PROCEDURE — 77030021352 HC CBL LD SYS DISP COVD -B

## 2021-03-10 PROCEDURE — 74011000250 HC RX REV CODE- 250: Performed by: INTERNAL MEDICINE

## 2021-03-10 PROCEDURE — 71275 CT ANGIOGRAPHY CHEST: CPT

## 2021-03-10 PROCEDURE — 71045 X-RAY EXAM CHEST 1 VIEW: CPT

## 2021-03-10 PROCEDURE — 81003 URINALYSIS AUTO W/O SCOPE: CPT

## 2021-03-10 PROCEDURE — 84443 ASSAY THYROID STIM HORMONE: CPT

## 2021-03-10 PROCEDURE — 80053 COMPREHEN METABOLIC PANEL: CPT

## 2021-03-10 PROCEDURE — 87635 SARS-COV-2 COVID-19 AMP PRB: CPT

## 2021-03-10 PROCEDURE — 74011250636 HC RX REV CODE- 250/636: Performed by: INTERNAL MEDICINE

## 2021-03-10 PROCEDURE — 74011250637 HC RX REV CODE- 250/637: Performed by: INTERNAL MEDICINE

## 2021-03-10 PROCEDURE — 82553 CREATINE MB FRACTION: CPT

## 2021-03-10 PROCEDURE — 74011250636 HC RX REV CODE- 250/636

## 2021-03-10 PROCEDURE — 93306 TTE W/DOPPLER COMPLETE: CPT

## 2021-03-10 PROCEDURE — 83605 ASSAY OF LACTIC ACID: CPT

## 2021-03-10 PROCEDURE — 85379 FIBRIN DEGRADATION QUANT: CPT

## 2021-03-10 PROCEDURE — 96375 TX/PRO/DX INJ NEW DRUG ADDON: CPT

## 2021-03-10 PROCEDURE — 83735 ASSAY OF MAGNESIUM: CPT

## 2021-03-10 PROCEDURE — 93005 ELECTROCARDIOGRAM TRACING: CPT

## 2021-03-10 PROCEDURE — 74011250637 HC RX REV CODE- 250/637

## 2021-03-10 PROCEDURE — 74011000636 HC RX REV CODE- 636: Performed by: EMERGENCY MEDICINE

## 2021-03-10 PROCEDURE — 65270000029 HC RM PRIVATE

## 2021-03-10 PROCEDURE — 99223 1ST HOSP IP/OBS HIGH 75: CPT | Performed by: INTERNAL MEDICINE

## 2021-03-10 PROCEDURE — 74011250636 HC RX REV CODE- 250/636: Performed by: EMERGENCY MEDICINE

## 2021-03-10 PROCEDURE — 96374 THER/PROPH/DIAG INJ IV PUSH: CPT

## 2021-03-10 PROCEDURE — 85730 THROMBOPLASTIN TIME PARTIAL: CPT

## 2021-03-10 PROCEDURE — 74011000258 HC RX REV CODE- 258: Performed by: EMERGENCY MEDICINE

## 2021-03-10 PROCEDURE — 74011000250 HC RX REV CODE- 250: Performed by: EMERGENCY MEDICINE

## 2021-03-10 PROCEDURE — 83880 ASSAY OF NATRIURETIC PEPTIDE: CPT

## 2021-03-10 PROCEDURE — 74011250637 HC RX REV CODE- 250/637: Performed by: EMERGENCY MEDICINE

## 2021-03-10 PROCEDURE — 85025 COMPLETE CBC W/AUTO DIFF WBC: CPT

## 2021-03-10 PROCEDURE — 82550 ASSAY OF CK (CPK): CPT

## 2021-03-10 PROCEDURE — 84484 ASSAY OF TROPONIN QUANT: CPT

## 2021-03-10 PROCEDURE — 87040 BLOOD CULTURE FOR BACTERIA: CPT

## 2021-03-10 PROCEDURE — 99285 EMERGENCY DEPT VISIT HI MDM: CPT

## 2021-03-10 PROCEDURE — 74011000258 HC RX REV CODE- 258: Performed by: INTERNAL MEDICINE

## 2021-03-10 PROCEDURE — 2709999900 HC NON-CHARGEABLE SUPPLY

## 2021-03-10 PROCEDURE — 85610 PROTHROMBIN TIME: CPT

## 2021-03-10 RX ORDER — CLOPIDOGREL BISULFATE 75 MG/1
75 TABLET ORAL DAILY
Status: DISCONTINUED | OUTPATIENT
Start: 2021-03-11 | End: 2021-03-10

## 2021-03-10 RX ORDER — MAGNESIUM SULFATE HEPTAHYDRATE 40 MG/ML
INJECTION, SOLUTION INTRAVENOUS
Status: DISPENSED
Start: 2021-03-10 | End: 2021-03-10

## 2021-03-10 RX ORDER — SODIUM CHLORIDE 9 MG/ML
1000 INJECTION, SOLUTION INTRAVENOUS ONCE
Status: COMPLETED | OUTPATIENT
Start: 2021-03-10 | End: 2021-03-10

## 2021-03-10 RX ORDER — METOPROLOL TARTRATE 5 MG/5ML
INJECTION INTRAVENOUS
Status: DISPENSED
Start: 2021-03-10 | End: 2021-03-10

## 2021-03-10 RX ORDER — ATORVASTATIN CALCIUM 20 MG/1
40 TABLET, FILM COATED ORAL
Status: DISCONTINUED | OUTPATIENT
Start: 2021-03-10 | End: 2021-03-12 | Stop reason: HOSPADM

## 2021-03-10 RX ORDER — MELATONIN
2000 DAILY
Status: DISCONTINUED | OUTPATIENT
Start: 2021-03-10 | End: 2021-03-12 | Stop reason: HOSPADM

## 2021-03-10 RX ORDER — FENOFIBRATE 48 MG/1
48 TABLET, COATED ORAL DAILY
Status: DISCONTINUED | OUTPATIENT
Start: 2021-03-10 | End: 2021-03-10

## 2021-03-10 RX ORDER — CLONAZEPAM 0.5 MG/1
0.5 TABLET ORAL
Status: DISCONTINUED | OUTPATIENT
Start: 2021-03-10 | End: 2021-03-12 | Stop reason: HOSPADM

## 2021-03-10 RX ORDER — GUAIFENESIN 100 MG/5ML
81 LIQUID (ML) ORAL DAILY
Status: DISCONTINUED | OUTPATIENT
Start: 2021-03-10 | End: 2021-03-12 | Stop reason: ALTCHOICE

## 2021-03-10 RX ORDER — ONDANSETRON 2 MG/ML
4 INJECTION INTRAMUSCULAR; INTRAVENOUS
Status: DISCONTINUED | OUTPATIENT
Start: 2021-03-10 | End: 2021-03-12 | Stop reason: HOSPADM

## 2021-03-10 RX ORDER — DILTIAZEM HYDROCHLORIDE 5 MG/ML
10 INJECTION INTRAVENOUS
Status: COMPLETED | OUTPATIENT
Start: 2021-03-10 | End: 2021-03-10

## 2021-03-10 RX ORDER — DOXEPIN HYDROCHLORIDE 25 MG/1
25 CAPSULE ORAL
Status: DISCONTINUED | OUTPATIENT
Start: 2021-03-10 | End: 2021-03-10

## 2021-03-10 RX ORDER — TAMSULOSIN HYDROCHLORIDE 0.4 MG/1
0.4 CAPSULE ORAL DAILY
Status: DISCONTINUED | OUTPATIENT
Start: 2021-03-10 | End: 2021-03-10

## 2021-03-10 RX ORDER — FUROSEMIDE 10 MG/ML
40 INJECTION INTRAMUSCULAR; INTRAVENOUS
Status: COMPLETED | OUTPATIENT
Start: 2021-03-10 | End: 2021-03-10

## 2021-03-10 RX ORDER — SODIUM CHLORIDE 0.9 % (FLUSH) 0.9 %
5-40 SYRINGE (ML) INJECTION AS NEEDED
Status: DISCONTINUED | OUTPATIENT
Start: 2021-03-10 | End: 2021-03-12 | Stop reason: HOSPADM

## 2021-03-10 RX ORDER — ONDANSETRON 2 MG/ML
INJECTION INTRAMUSCULAR; INTRAVENOUS
Status: COMPLETED
Start: 2021-03-10 | End: 2021-03-10

## 2021-03-10 RX ORDER — FUROSEMIDE 10 MG/ML
40 INJECTION INTRAMUSCULAR; INTRAVENOUS EVERY 8 HOURS
Status: DISCONTINUED | OUTPATIENT
Start: 2021-03-10 | End: 2021-03-11

## 2021-03-10 RX ORDER — TERAZOSIN 5 MG/1
10 CAPSULE ORAL
Status: DISCONTINUED | OUTPATIENT
Start: 2021-03-10 | End: 2021-03-12 | Stop reason: HOSPADM

## 2021-03-10 RX ORDER — ONDANSETRON 2 MG/ML
4 INJECTION INTRAMUSCULAR; INTRAVENOUS
Status: COMPLETED | OUTPATIENT
Start: 2021-03-10 | End: 2021-03-10

## 2021-03-10 RX ORDER — PROMETHAZINE HYDROCHLORIDE 25 MG/1
12.5 TABLET ORAL
Status: DISCONTINUED | OUTPATIENT
Start: 2021-03-10 | End: 2021-03-12 | Stop reason: HOSPADM

## 2021-03-10 RX ORDER — ACETAMINOPHEN 650 MG/1
650 SUPPOSITORY RECTAL
Status: DISCONTINUED | OUTPATIENT
Start: 2021-03-10 | End: 2021-03-12 | Stop reason: HOSPADM

## 2021-03-10 RX ORDER — METOPROLOL TARTRATE 25 MG/1
TABLET, FILM COATED ORAL
Status: COMPLETED
Start: 2021-03-10 | End: 2021-03-10

## 2021-03-10 RX ORDER — ACETAMINOPHEN 325 MG/1
650 TABLET ORAL
Status: DISCONTINUED | OUTPATIENT
Start: 2021-03-10 | End: 2021-03-12 | Stop reason: HOSPADM

## 2021-03-10 RX ORDER — SODIUM CHLORIDE 0.9 % (FLUSH) 0.9 %
5-40 SYRINGE (ML) INJECTION EVERY 8 HOURS
Status: DISCONTINUED | OUTPATIENT
Start: 2021-03-10 | End: 2021-03-12 | Stop reason: HOSPADM

## 2021-03-10 RX ORDER — METOPROLOL TARTRATE 25 MG/1
12.5 TABLET, FILM COATED ORAL ONCE
Status: COMPLETED | OUTPATIENT
Start: 2021-03-10 | End: 2021-03-10

## 2021-03-10 RX ORDER — METOPROLOL TARTRATE 5 MG/5ML
2.5 INJECTION INTRAVENOUS
Status: COMPLETED | OUTPATIENT
Start: 2021-03-10 | End: 2021-03-10

## 2021-03-10 RX ORDER — HYDROCODONE BITARTRATE AND ACETAMINOPHEN 5; 325 MG/1; MG/1
1 TABLET ORAL
Status: DISCONTINUED | OUTPATIENT
Start: 2021-03-10 | End: 2021-03-12 | Stop reason: HOSPADM

## 2021-03-10 RX ORDER — POLYETHYLENE GLYCOL 3350 17 G/17G
17 POWDER, FOR SOLUTION ORAL DAILY PRN
Status: DISCONTINUED | OUTPATIENT
Start: 2021-03-10 | End: 2021-03-12 | Stop reason: HOSPADM

## 2021-03-10 RX ORDER — PANTOPRAZOLE SODIUM 20 MG/1
20 TABLET, DELAYED RELEASE ORAL
Status: DISCONTINUED | OUTPATIENT
Start: 2021-03-11 | End: 2021-03-12 | Stop reason: HOSPADM

## 2021-03-10 RX ORDER — FUROSEMIDE 10 MG/ML
20 INJECTION INTRAMUSCULAR; INTRAVENOUS
Status: DISCONTINUED | OUTPATIENT
Start: 2021-03-10 | End: 2021-03-10

## 2021-03-10 RX ORDER — METOPROLOL TARTRATE 5 MG/5ML
2.5 INJECTION INTRAVENOUS ONCE
Status: COMPLETED | OUTPATIENT
Start: 2021-03-10 | End: 2021-03-10

## 2021-03-10 RX ORDER — METOPROLOL TARTRATE 50 MG/1
100 TABLET ORAL 2 TIMES DAILY
Status: DISCONTINUED | OUTPATIENT
Start: 2021-03-10 | End: 2021-03-12 | Stop reason: HOSPADM

## 2021-03-10 RX ORDER — ENOXAPARIN SODIUM 100 MG/ML
40 INJECTION SUBCUTANEOUS DAILY
Status: DISCONTINUED | OUTPATIENT
Start: 2021-03-10 | End: 2021-03-11

## 2021-03-10 RX ORDER — GUAIFENESIN 100 MG/5ML
162 LIQUID (ML) ORAL
Status: COMPLETED | OUTPATIENT
Start: 2021-03-10 | End: 2021-03-10

## 2021-03-10 RX ORDER — MAG HYDROX/ALUMINUM HYD/SIMETH 200-200-20
30 SUSPENSION, ORAL (FINAL DOSE FORM) ORAL
Status: DISCONTINUED | OUTPATIENT
Start: 2021-03-10 | End: 2021-03-12 | Stop reason: HOSPADM

## 2021-03-10 RX ADMIN — TERAZOSIN HYDROCHLORIDE 10 MG: 5 CAPSULE ORAL at 22:12

## 2021-03-10 RX ADMIN — METOPROLOL TARTRATE 100 MG: 50 TABLET, FILM COATED ORAL at 10:15

## 2021-03-10 RX ADMIN — METOPROLOL TARTRATE 12.5 MG: 25 TABLET, FILM COATED ORAL at 04:51

## 2021-03-10 RX ADMIN — ASPIRIN 162 MG: 81 TABLET, CHEWABLE ORAL at 06:00

## 2021-03-10 RX ADMIN — METOPROLOL TARTRATE 2.5 MG: 5 INJECTION INTRAVENOUS at 05:22

## 2021-03-10 RX ADMIN — ALUMINUM HYDROXIDE, MAGNESIUM HYDROXIDE, AND SIMETHICONE 30 ML: 200; 200; 20 SUSPENSION ORAL at 18:19

## 2021-03-10 RX ADMIN — DILTIAZEM HYDROCHLORIDE 5 MG/HR: 5 INJECTION INTRAVENOUS at 06:00

## 2021-03-10 RX ADMIN — ONDANSETRON 4 MG: 2 SOLUTION INTRAMUSCULAR; INTRAVENOUS at 06:29

## 2021-03-10 RX ADMIN — Medication 10 ML: at 22:13

## 2021-03-10 RX ADMIN — ALUMINUM HYDROXIDE, MAGNESIUM HYDROXIDE, AND SIMETHICONE 30 ML: 200; 200; 20 SUSPENSION ORAL at 22:13

## 2021-03-10 RX ADMIN — ONDANSETRON 4 MG: 2 INJECTION INTRAMUSCULAR; INTRAVENOUS at 13:39

## 2021-03-10 RX ADMIN — FUROSEMIDE 40 MG: 10 INJECTION, SOLUTION INTRAMUSCULAR; INTRAVENOUS at 10:16

## 2021-03-10 RX ADMIN — ATORVASTATIN CALCIUM 40 MG: 20 TABLET, FILM COATED ORAL at 22:12

## 2021-03-10 RX ADMIN — Medication 10 ML: at 10:20

## 2021-03-10 RX ADMIN — HYDROCODONE BITARTRATE AND ACETAMINOPHEN 1 TABLET: 5; 325 TABLET ORAL at 22:12

## 2021-03-10 RX ADMIN — METOPROLOL TARTRATE 2.5 MG: 5 INJECTION INTRAVENOUS at 04:50

## 2021-03-10 RX ADMIN — CLONAZEPAM 0.5 MG: 0.5 TABLET ORAL at 10:36

## 2021-03-10 RX ADMIN — DILTIAZEM HYDROCHLORIDE 10 MG/HR: 5 INJECTION INTRAVENOUS at 17:21

## 2021-03-10 RX ADMIN — METOPROLOL TARTRATE 2.5 MG: 5 INJECTION INTRAVENOUS at 05:00

## 2021-03-10 RX ADMIN — METOPROLOL TARTRATE 100 MG: 50 TABLET, FILM COATED ORAL at 17:55

## 2021-03-10 RX ADMIN — FUROSEMIDE 40 MG: 10 INJECTION, SOLUTION INTRAMUSCULAR; INTRAVENOUS at 13:39

## 2021-03-10 RX ADMIN — DILTIAZEM HYDROCHLORIDE 10 MG: 5 INJECTION, SOLUTION INTRAVENOUS at 06:00

## 2021-03-10 RX ADMIN — IOPAMIDOL 80 ML: 612 INJECTION, SOLUTION INTRAVENOUS at 06:07

## 2021-03-10 RX ADMIN — ENOXAPARIN SODIUM 40 MG: 40 INJECTION SUBCUTANEOUS at 10:16

## 2021-03-10 RX ADMIN — ASPIRIN 81 MG: 81 TABLET, CHEWABLE ORAL at 10:15

## 2021-03-10 RX ADMIN — CLONAZEPAM 0.5 MG: 0.5 TABLET ORAL at 19:57

## 2021-03-10 RX ADMIN — CHOLECALCIFEROL (VITAMIN D3) 25 MCG (1,000 UNIT) TABLET 2000 UNITS: at 10:15

## 2021-03-10 RX ADMIN — FUROSEMIDE 40 MG: 10 INJECTION, SOLUTION INTRAMUSCULAR; INTRAVENOUS at 22:12

## 2021-03-10 RX ADMIN — SODIUM CHLORIDE 1000 ML: 900 INJECTION, SOLUTION INTRAVENOUS at 04:50

## 2021-03-10 RX ADMIN — Medication 10 ML: at 13:43

## 2021-03-10 RX ADMIN — FUROSEMIDE 40 MG: 10 INJECTION, SOLUTION INTRAMUSCULAR; INTRAVENOUS at 06:54

## 2021-03-10 NOTE — ROUTINE PROCESS
0940:  Spoke to MD; orders to increase cardizem gtt.    1015:  Administered AM meds.    1223:  Spoke to MD; patients HR in the 80's; orders to decrease cardizem gtt.    1339:  Due meds administered.    1755:  Administered due meds.    1815:  MD paged regarding patients elevated HR, and several call from MT informing me of patients VT.  Orders placed to increase cardizem gtt and Maalox ordered for indigestion.    1835:  Patient with 2.4 second pause.  Earlier in the day patient had a 2.9 second pause.    1930:  Bedside and Verbal shift change report given to ARIS Colbert (oncoming nurse) by ARIS Pool (offgoing nurse). Report included the following information SBAR, Kardex, MAR and Recent Results.     Call MD if HR sustains above 120's

## 2021-03-10 NOTE — ED NOTES
TRANSFER - ED to INPATIENT REPORT:    Verbal report given to Raul Mendes on [facility]  being transferred to 2400(unit) for routine progression of care       Report consisted of patients Situation, Background, Assessment and   Recommendations(SBAR). SBAR report made available to receiving floor on this patient being transferred to 2408  for routine progression of care       Admitting diagnosis CHF (congestive heart failure) ([facility] [address] [I50.9]    Information from the following report(s) SBAR, Kardex, ED Summary, Intake/Output, MAR and Recent Results was made available to receiving floor. Lines:   Peripheral IV 03/10/21 Right Forearm (Active)   Site Assessment Clean, dry, & intact 03/10/21 0610   Phlebitis Assessment 0 03/10/21 0610   Infiltration Assessment 0 03/10/21 0610   Dressing Status Clean, dry, & intact 03/10/21 0610   Dressing Type Transparent 03/10/21 0610   Hub Color/Line Status Green 03/10/21 0610       Peripheral IV 03/10/21 Left Antecubital (Active)   Site Assessment Clean, dry, & intact 03/10/21 0612   Phlebitis Assessment 0 03/10/21 0612   Infiltration Assessment 0 03/10/21 0612   Dressing Status Clean, dry, & intact 03/10/21 0612   Dressing Type Transparent 03/10/21 0612   Hub Color/Line Status Green 03/10/21 0612     Medication list updated today    Opportunity for questions and clarification was provided.       Patient is oriented to time, place, person and situation High alert med  Patient is  continent and ambulatory with assist     Valuables transported with patient     Patient transported with:   Monitor  O2 @ 4 liters  Registered Nurse    MAP (Monitor): 70 =Monitored (most recent)  Vitals w/ MEWS Score (last day)     Date/Time MEWS Score Pulse Resp Temp BP Level of Consciousness SpO2    03/10/21 0645    (!) 129  24    (!) 131/47    92 %    03/10/21 0634    (!) 125  (!) 33    111/60    92 %    03/10/21 06:08:11              100 %    03/10/21 0604  5  (!) 154  18  98.1 °F (36.7 °C) 98/77  Alert  98 %    03/10/21 0600    (!) 115      111/60        03/10/21 0559            Alert                  Lactic Acid

## 2021-03-10 NOTE — PROGRESS NOTES
conducted an initial consultation and Spiritual Assessment for Hanh Santoyo, who is a 80 y.o.,male. Patients Primary Language is: [place]. According to the patients EMR Advent Affiliation is: Scientologist. The reason the Patient came to the hospital is:   Patient Active Problem List    Diagnosis Date Noted    CHF (congestive heart failure) ([facility] [address] 03/10/2021    Hypokalemia 10/25/2018    TIA (transient ischemic attack) 10/22/2018    Cerebrovascular accident (CVA) due to embolism of left middle cerebral artery ([facility] [address] 10/22/2018    BPH (benign prostatic hyperplasia) 11/18/2013    Esophageal reflux 11/18/2013    Essential hypertension, benign 11/18/2013    Other and unspecified hyperlipidemia 11/18/2013    ED (erectile dysfunction) 11/18/2013    CRF (chronic renal failure) 11/18/2013        The  provided the following Interventions:  Initiated a relationship of care and support with patient in room 2405 this morning as he arrived from the emergency room. Listened empathically as he talked about his being here and called his family to inform them of his being here. Patient was asked about his having an advance directive and he replied that all that is taken care of and safe at home. Provided information about Spiritual Care Services. Offered prayer and assurance of continued prayers on patients behalf. The following outcomes were achieved:  Patient shared limited information about his medical narrative and spiritual journey/beliefs. Patient processed feeling about current hospitalization. Patient expressed gratitude for pastoral care visit. Assessment:  Patient does not have any Zoroastrian/cultural needs that will affect patients preferences in health care. There are no further spiritual or Zoroastrian issues which require Spiritual Care Services interventions at this time.        Plan:  Chaplains will continue to follow and will provide pastoral care on an as needed/requested basis    . Zuleika Mary   Spiritual Care   [phone]

## 2021-03-10 NOTE — ED TRIAGE NOTES
Pt arrives with complaints of urinary retention for 2 days, upon arrival patient tachycardic around 150bpm, no chest pain, no shortness of breath.

## 2021-03-10 NOTE — H&P
Hospitalist Admission Note    NAME: Agustin Perales   :  1934   MRN:  [id number]     Date/Time:  3/10/2021 9:14 AM    Patient PCP: Imer Carbajal MD  ______________________________________________________________________  Given the patient's current clinical presentation, I have a high level of concern for decompensation if discharged from the emergency department. Complex decision making was performed, which includes reviewing the patient's available past medical records, laboratory results, and x-ray films. My assessment of this patient's clinical condition and my plan of care is as follows. Assessment / Plan:    CHF, acute, NOS  - admit, tele  - suspect aggravated by tachycardia  - rate control  - lasix iv  - strict I/o, place ravi  - daily wt  - check echo      Afib RVR- NOS  - cont cardizem drip, goal hr < 100  - echo  - tsh wnl  - recheck trop  - cont home metoprolol  - cardiology consulted    Cerebrovascular dz  - cont asa, plavix    HLD  - cont statin, tricor    FEI  - cont klonopin, doxepin    BPH  - cont flomax, hytrin    HTN  - holding clonidine and norvasc, in setting soft bp, and need for cardizem drip and lasix    Subjective:   CHIEF COMPLAINT: back pian, nausea,     HISTORY OF PRESENT ILLNESS:     Vickie Lam is a 80 y.o.  male who presents with mulitple vague complaints, including chronic back pain, nausea with inability to vomit, difficulty voiding. These symtpoms started or worsend yesterday, came to ed to have checked out. Interestingly, in ed found to be in chf with afib and rvr. Pt has no hx of either diagnosis. He denies sob, but requiring o2, HR in 140s during my exam but deneis palpitations, he denies cp at any time, denies orthopnea. We were asked to admit for work up and evaluation of the above problems.      Past Medical History:   Diagnosis Date    Alcohol dependence, episodic drinking behavior ([address]     Other and Unspecified    Anxiety state     Arthritis     BPH with obstruction/lower urinary tract symptoms     Carpal tunnel syndrome     Chronic kidney disease, stage III (moderate) (HCC)     Dyslipidemia     Elevated PSA 2011    Esophageal reflux     Essential hypertension, benign     Exercise tolerance finding July 2015    GERD (gastroesophageal reflux disease)     Gouty arthropathy     History of blood transfusion     Hypopotassemia     Hyposmolality and/or hyponatremia     Irritable bowel syndrome     Knee pain, left     Lumbago     Lumbar back pain     S/P TKR (total knee replacement) 2013    Scoliosis deformity of spine     Sleep apnea         Past Surgical History:   Procedure Laterality Date    HX CHOLECYSTECTOMY      HX HERNIA REPAIR      HX OTHER SURGICAL  07.31.2015    HX ADJACENT TISSUE TRANSFER/REARRANGEMENT       Social History     Tobacco Use    Smoking status: Former Smoker    Smokeless tobacco: Never Used   Substance Use Topics    Alcohol use: Yes     Alcohol/week: 3.0 standard drinks     Types: 3 Cans of beer per week        Family History   Problem Relation Age of Onset    Hypertension Father     Diabetes Mother      No Known Allergies     Prior to Admission medications    Medication Sig Start Date End Date Taking? Authorizing Provider   tri mix compound PGE 20 mcg/ml  Papaverine 30 mg/ml   Phentolamine 2   mg/ml Inject 0.4-0.5 ml prn 11/27/18   Gracia London NP   aspirin (ASPIRIN) 325 mg tablet Take 1 Tab by mouth daily. 10/25/18   Rene Corado MD   atorvastatin (LIPITOR) 40 mg tablet Take 1 Tab by mouth nightly. 10/25/18   Rene Corado MD   clopidogrel (PLAVIX) 75 mg tab Take 1 Tab by mouth daily. 10/25/18   Rene Corado MD   fenofibrate nanocrystallized (TRICOR) 48 mg tablet Take 1 Tab by mouth daily. 10/25/18   Rene Corado MD   potassium chloride SR (K-TAB) 20 mEq tablet Take 2 Tabs by mouth daily.  10/25/18   Sean Corado MD BOSSMAN   tamsulosin (FLOMAX) 0.4 mg capsule Take 1 Cap by mouth daily. 10/25/18   Eloisa Corado MD   cyclobenzaprine (FLEXERIL) 10 mg tablet 10 mg. 7/5/17   Provider, Historical   HYDROcodone-acetaminophen (NORCO) 5-325 mg per tablet Take 1 Tab by Mouth Every 6 Hours As Needed for Pain. 10/31/17   Provider, Historical   MULTIVITAMIN PO Take  by Mouth Once a Day. Provider, Historical   sucralfate (CARAFATE) 1 gram tablet 1 g. 11/8/13   Provider, Historical   OTHER,NON-FORMULARY, Please dispense a 5 ml solution of Trimix consisting of PGE 20 mcg/Papaverine 30 mg/Phentolamine 2 mg per ml. Patient is to inject 0.4-0.5 ml. 1/26/18   Cinthia Noble MD   Syringe with Needle,Disp, Tray (ALLERGIST TRAY 1CC 27GX1/2\") 1 mL 27 x 1/2\" tray Please dispense 25 each/one tray BD brand allergy syringes. To be used as directed. 1/26/18   Cinthia Noble MD   cholecalciferol (VITAMIN D3) 1,000 unit tablet Take 2 Tabs by mouth daily. 12/9/13   Rosalba Black MD   clonazePAM (KLONOPIN) 0.5 mg tablet Take 1 Tab by mouth two (2) times daily as needed. 12/9/13   Rosalba Black MD   cloNIDine (CATAPRES) 0.1 mg tablet Take 1 Tab by mouth two (2) times a day. 12/9/13   Rosalba Black MD   doxepin (SINEQUAN) 25 mg capsule Take 1 Cap by mouth nightly. 12/9/13   Rosalba Black MD   metoprolol (LOPRESSOR) 100 mg tablet Take 1 Tab by mouth two (2) times a day. 12/9/13   Rosalba Black MD   terazosin (HYTRIN) 10 mg capsule Take 1 Cap by mouth nightly. 12/9/13   Rosalba Black MD   amLODIPine (NORVASC) 5 mg tablet Take 1 Tab by mouth daily. 12/9/13   Rosalba Black MD   oxyCODONE IR (ROXICODONE) 5 mg immediate release tablet Take 1 Tab by mouth every four (4) hours as needed. 12/9/13   Rosalba Black MD   zolpidem (AMBIEN) 5 mg tablet Take 1 Tab by mouth nightly as needed for Sleep.  12/9/13   Rosalba Black MD   Omeprazole delayed release (PRILOSEC D/R) 20 mg tablet Take 20 mg by mouth daily.    Provider, Historical       REVIEW OF SYSTEMS:     I am not able to complete the review of systems because: The patient is intubated and sedated    The patient has altered mental status due to his acute medical problems    The patient has baseline aphasia from prior stroke(s)    The patient has baseline dementia and is not reliable historian    The patient is in acute medical distress and unable to provide information           Total of 12 systems reviewed as follows:       POSITIVE= underlined text  Negative = text not underlined  General:  fever, chills, sweats, generalized weakness, weight loss/gain,      loss of appetite   Eyes:    blurred vision, eye pain, loss of vision, double vision  ENT:    rhinorrhea, pharyngitis   Respiratory:   cough, sputum production, SOB, MENDOZA, wheezing, pleuritic pain   Cardiology:   chest pain, palpitations, orthopnea, PND, edema, syncope   Gastrointestinal:   Nausea  Genitourinary:  Difficulty urinating  Muskuloskeletal :  back pain  Hematology:  easy bruising, nose or gum bleeding, lymphadenopathy   Dermatological: rash, ulceration, pruritis, color change / jaundice  Endocrine:   hot flashes or polydipsia   Neurological:  headache, dizziness, confusion, focal weakness, paresthesia,     Speech difficulties, memory loss, gait difficulty  Psychological: Feelings of anxiety, depression, agitation    Objective:   VITALS:    Visit Vitals  BP (!) 101/57   Pulse (!) 116   Temp 98.1 °F (36.7 °C)   Resp 22   Ht 5' 7\" (1.702 m)   Wt 81.6 kg (180 lb)   SpO2 96%   BMI 28.19 kg/m²       PHYSICAL EXAM:    General:    Alert, cooperative, no distress, appears stated age. HEENT: Atraumatic, anicteric sclerae, pink conjunctivae     No oral ulcers, mucosa moist, throat clear, dentition fair  Neck:  Supple, symmetrical,  thyroid: non tender  Lungs:   Mildly wet, mild crackles at base, end exp wheeze  Chest wall:  No tenderness  No Accessory muscle use.   Heart:   irreg irreg, tachy  Abdomen:   Soft, non-tender. Not distended. Bowel sounds normal  Extremities: No cyanosis. No clubbing,      Skin turgor normal, Capillary refill normal, Radial dial pulse 2+  Skin:     Not pale. Not Jaundiced  No rashes   Psych:  Good insight. Not depressed. Not anxious or agitated. Neurologic: EOMs intact. No facial asymmetry. No aphasia or slurred speech. Symmetrical strength, Sensation grossly intact. Alert and oriented X 4.     _______________________________________________________________________  Care Plan discussed with:    Comments   Patient x    Family      RN x    Care Manager                    Consultant:      _______________________________________________________________________  Expected  Disposition:   Home with Family x   HH/PT/OT/RN    SNF/LTC    EMORY    ________________________________________________________________________  TOTAL TIME:  28 Minutes    Critical Care Provided     Minutes non procedure based      Comments     Reviewed previous records   >50% of visit spent in counseling and coordination of care  Discussion with patient and/or family and questions answered       ________________________________________________________________________  Signed: Jonah Peañ MD    Procedures: see electronic medical records for all procedures/Xrays and details which were not copied into this note but were reviewed prior to creation of Plan.     LAB DATA REVIEWED:    Recent Results (from the past 24 hour(s))   PROTHROMBIN TIME + INR    Collection Time: 03/10/21  4:34 AM   Result Value Ref Range    Prothrombin time 14.4 11.5 - 15.2 sec    INR 1.1 0.8 - 1.2     PTT    Collection Time: 03/10/21  4:34 AM   Result Value Ref Range    aPTT 32.6 23.0 - 36.4 SEC   CBC WITH AUTOMATED DIFF    Collection Time: 03/10/21  4:34 AM   Result Value Ref Range    WBC 8.9 4.6 - 13.2 K/uL    RBC 3.92 (L) 4.70 - 5.50 M/uL    HGB 12.2 (L) 13.0 - 16.0 g/dL    HCT 38.2 36.0 - 48.0 %    MCV 97.4 (H) 74.0 - 97.0 FL MCH 31.1 24.0 - 34.0 PG    MCHC 31.9 31.0 - 37.0 g/dL    RDW 13.6 11.6 - 14.5 %    PLATELET 446 367 - 983 K/uL    MPV 11.2 9.2 - 11.8 FL    NEUTROPHILS 76 (H) 40 - 73 %    LYMPHOCYTES 15 (L) 21 - 52 %    MONOCYTES 8 3 - 10 %    EOSINOPHILS 1 0 - 5 %    BASOPHILS 0 0 - 2 %    ABS. NEUTROPHILS 6.8 1.8 - 8.0 K/UL    ABS. LYMPHOCYTES 1.3 0.9 - 3.6 K/UL    ABS. MONOCYTES 0.7 0.05 - 1.2 K/UL    ABS. EOSINOPHILS 0.1 0.0 - 0.4 K/UL    ABS. BASOPHILS 0.0 0.0 - 0.1 K/UL    DF AUTOMATED     CPK-MB PANEL    Collection Time: 03/10/21  4:34 AM   Result Value Ref Range    CK - MB 2.6 <3.6 ng/ml    CK-MB Index 4.5 (H) 0.0 - 4.0 %    CK 58 39 - 308 U/L    Troponin-I, QT <0.02 0.0 - 0.045 NG/ML   D DIMER    Collection Time: 03/10/21  4:34 AM   Result Value Ref Range    D DIMER 2.16 (H) <0.46 ug/ml(FEU)   MAGNESIUM    Collection Time: 03/10/21  4:34 AM   Result Value Ref Range    Magnesium 1.9 1.6 - 2.6 mg/dL   METABOLIC PANEL, COMPREHENSIVE    Collection Time: 03/10/21  4:34 AM   Result Value Ref Range    Sodium 137 136 - 145 mmol/L    Potassium 4.3 3.5 - 5.5 mmol/L    Chloride 107 100 - 111 mmol/L    CO2 23 21 - 32 mmol/L    Anion gap 7 3.0 - 18 mmol/L    Glucose 155 (H) 74 - 99 mg/dL    BUN 22 (H) 7.0 - 18 MG/DL    Creatinine 1.79 (H) 0.6 - 1.3 MG/DL    BUN/Creatinine ratio 12 12 - 20      GFR est AA 44 (L) >60 ml/min/1.73m2    GFR est non-AA 36 (L) >60 ml/min/1.73m2    Calcium 8.7 8.5 - 10.1 MG/DL    Bilirubin, total 1.2 (H) 0.2 - 1.0 MG/DL    ALT (SGPT) 27 16 - 61 U/L    AST (SGOT) 14 10 - 38 U/L    Alk.  phosphatase 107 45 - 117 U/L    Protein, total 6.1 (L) 6.4 - 8.2 g/dL    Albumin 3.3 (L) 3.4 - 5.0 g/dL    Globulin 2.8 2.0 - 4.0 g/dL    A-G Ratio 1.2 0.8 - 1.7     NT-PRO BNP    Collection Time: 03/10/21  4:34 AM   Result Value Ref Range    NT pro-BNP 8,581 (H) 0 - 1,800 PG/ML   TSH 3RD GENERATION    Collection Time: 03/10/21  4:34 AM   Result Value Ref Range    TSH 1.30 0.36 - 3.74 uIU/mL   SARS-COV-2    Collection Time: 03/10/21  6:30 AM   Result Value Ref Range    SARS-CoV-2 Please find results under separate order     COVID-19 RAPID TEST    Collection Time: 03/10/21  6:30 AM   Result Value Ref Range    Specimen source Nasopharyngeal      COVID-19 rapid test Not detected NOTD     URINALYSIS W/ RFLX MICROSCOPIC    Collection Time: 03/10/21  7:15 AM   Result Value Ref Range    Color YELLOW      Appearance CLEAR      Specific gravity 1.021 1.005 - 1.030      pH (UA) 5.0 5.0 - 8.0      Protein Negative NEG mg/dL    Glucose Negative NEG mg/dL    Ketone Negative NEG mg/dL    Bilirubin Negative NEG      Blood Negative NEG      Urobilinogen 0.2 0.2 - 1.0 EU/dL    Nitrites Negative NEG      Leukocyte Esterase Negative NEG

## 2021-03-10 NOTE — CONSULTS
CARDIOLOGY CONSULT    Patient: Jade Young  MR #: [id number]      Reason for consult: Atrial fibrillation with a rapid ventricular response    Assessment/Plan:     Hospital Problems  Date Reviewed: 1/26/2018          Codes Class Noted POA    CHF (congestive heart failure) ([facility] ICD-10-CM: I50.9  ICD-9-CM: 428.0  3/10/2021 Unknown        Cerebrovascular disease ICD-10-CM: I67.9  ICD-9-CM: 437.9  3/10/2021 Unknown        Dyslipidemia ICD-10-CM: E78.5  ICD-9-CM: 272.4  3/10/2021 Unknown        Atrial fibrillation with rapid ventricular response ([facility] [id number] score 6 - 7, admitting ECG presently not available. Repeat ordered. Patient is on metoprolol 100 mg twice daily at present for rate control. Heart rate is presently approximately 100 bpm.  Echocardiogram reviewed. Ejection fraction 45 to 50% with moderate MR. There is a dilated left atrium. There is also a left pleural effusion. His main complaint is that of nausea. Will follow rate on telemetry. He lives independently has had no recent falls. He did have a mechanical fall in the remote past.  Would recommend full anticoagulation with Eliquis. Agree with diuretics as planned. Will see how heart rate settles out here in the next 12 to 24 hours. Presently, he is hemodynamically stable ICD-10-CM: I48.91  ICD-9-CM: 427.31  3/10/2021 Unknown        Cerebrovascular accident (CVA) due to embolism of left middle cerebral artery ([facility] ICD-10-CM: R35.645  ICD-9-CM: 434.11  10/22/2018 Yes        BPH (benign prostatic hyperplasia) ICD-10-CM: N40.0  ICD-9-CM: 600.00  11/18/2013 Yes        Esophageal reflux ICD-10-CM: K21.9  ICD-9-CM: 530.81  11/18/2013 Yes        Essential hypertension, benign ICD-10-CM: I10  ICD-9-CM: 401.1  11/18/2013 Yes              History of Present Illness  Jade Young is a 80 y.o. hypertensive man with no known prior cardiac illness. The patient reported that over the last several days he has been very nauseated.   He has not vomited. Has had no chest pain or shortness of breath. He did notice some lightheadedness. Because of the persistence of symptoms and the fact that he woke up at 3 AM this morning \"scared \"with continuing symptoms, he sought medical attention in the ED at [facility]. His initial electrocardiogram demonstrated atrial fibrillation with a rapid ventricular response and the patient was started on intravenous diltiazem. He is on metoprolol 100 mg twice daily now saved several 5 mg intravenous boluses in the ED. He does have a history of a previous CVA. He also has a history of hypertension and dyslipidemia.       Past Medical History  Past Medical History:   Diagnosis Date    Alcohol dependence, episodic drinking behavior ([address]     Other and Unspecified    Anxiety state     Arthritis     BPH with obstruction/lower urinary tract symptoms     Carpal tunnel syndrome     Chronic kidney disease, stage III (moderate) (HCC)     Dyslipidemia     Elevated PSA 2011    Esophageal reflux     Essential hypertension, benign     Exercise tolerance finding July 2015    GERD (gastroesophageal reflux disease)     Gouty arthropathy     History of blood transfusion     Hypopotassemia     Hyposmolality and/or hyponatremia     Irritable bowel syndrome     Knee pain, left     Lumbago     Lumbar back pain     S/P TKR (total knee replacement) 2013    Scoliosis deformity of spine     Sleep apnea        Past Surgical History  Social History     Socioeconomic History    Marital status:      Spouse name: Not on file    Number of children: Not on file    Years of education: Not on file    Highest education level: Not on file   Occupational History    Not on file   Social Needs    Financial resource strain: Not on file    Food insecurity     Worry: Not on file     Inability: Not on file    Transportation needs     Medical: Not on file     Non-medical: Not on file   Tobacco Use    Smoking status: Former Smoker  Smokeless tobacco: Never Used   Substance and Sexual Activity    Alcohol use:  Yes     Alcohol/week: 3.0 standard drinks     Types: 3 Cans of beer per week    Drug use: No    Sexual activity: Not on file   Lifestyle    Physical activity     Days per week: Not on file     Minutes per session: Not on file    Stress: Not on file   Relationships    Social connections     Talks on phone: Not on file     Gets together: Not on file     Attends [employer] service: Not on file     Active member of club or organization: Not on file     Attends meetings of clubs or organizations: Not on file     Relationship status: Not on file    Intimate partner violence     Fear of current or ex partner: Not on file     Emotionally abused: Not on file     Physically abused: Not on file     Forced sexual activity: Not on file   Other Topics Concern    Not on file   Social History Narrative    Not on file         Meds  Current Facility-Administered Medications   Medication Dose Route Frequency    dilTIAZem (CARDIZEM) 125 mg in 0.9% sodium chloride 125 mL infusion  10 mg/hr IntraVENous CONTINUOUS    furosemide (LASIX) injection 40 mg  40 mg IntraVENous Q8H    atorvastatin (LIPITOR) tablet 40 mg  40 mg Oral QHS    cholecalciferol (VITAMIN D3) (1000 Units /25 mcg) tablet 2,000 Units  2,000 Units Oral DAILY    clonazePAM (KlonoPIN) tablet 0.5 mg  0.5 mg Oral BID PRN    aspirin chewable tablet 81 mg  81 mg Oral DAILY    HYDROcodone-acetaminophen (NORCO) 5-325 mg per tablet 1 Tab  1 Tab Oral Q6H PRN    metoprolol tartrate (LOPRESSOR) tablet 100 mg  100 mg Oral BID    [START ON 3/11/2021] pantoprazole (PROTONIX) tablet 20 mg  20 mg Oral ACB    terazosin (HYTRIN) capsule 10 mg  10 mg Oral QHS    sodium chloride (NS) flush 5-40 mL  5-40 mL IntraVENous Q8H    sodium chloride (NS) flush 5-40 mL  5-40 mL IntraVENous PRN    acetaminophen (TYLENOL) tablet 650 mg  650 mg Oral Q6H PRN    Or    acetaminophen (TYLENOL) suppository 650 mg  650 mg Rectal Q6H PRN    polyethylene glycol (MIRALAX) packet 17 g  17 g Oral DAILY PRN    promethazine (PHENERGAN) tablet 12.5 mg  12.5 mg Oral Q6H PRN    Or    ondansetron (ZOFRAN) injection 4 mg  4 mg IntraVENous Q6H PRN    enoxaparin (LOVENOX) injection 40 mg  40 mg SubCUTAneous DAILY    metoprolol (LOPRESSOR) 5 mg/5 mL injection        metoprolol tartrate (LOPRESSOR) 25 mg tablet        magnesium sulfate 2 g/50 ml 2 gram/50 mL (4 %) IVPB premix pgbk        metoprolol (LOPRESSOR) 5 mg/5 mL injection             Allergies  No Known Allergies    Social History  Social History     Socioeconomic History    Marital status:      Spouse name: Not on file    Number of children: Not on file    Years of education: Not on file    Highest education level: Not on file   Occupational History    Not on file   Social Needs    Financial resource strain: Not on file    Food insecurity     Worry: Not on file     Inability: Not on file    Transportation needs     Medical: Not on file     Non-medical: Not on file   Tobacco Use    Smoking status: Former Smoker    Smokeless tobacco: Never Used   Substance and Sexual Activity    Alcohol use:  Yes     Alcohol/week: 3.0 standard drinks     Types: 3 Cans of beer per week    Drug use: No    Sexual activity: Not on file   Lifestyle    Physical activity     Days per week: Not on file     Minutes per session: Not on file    Stress: Not on file   Relationships    Social connections     Talks on phone: Not on file     Gets together: Not on file     Attends Pent[employer]alism service: Not on file     Active member of club or organization: Not on file     Attends meetings of clubs or organizations: Not on file     Relationship status: Not on file    Intimate partner violence     Fear of current or ex partner: Not on file     Emotionally abused: Not on file     Physically abused: Not on file     Forced sexual activity: Not on file   Other Topics Concern    Not on file Social History Narrative    Not on file       Family History     Family History   Problem Relation Age of Onset    Hypertension Father     Diabetes Mother          Review of systems    General: No fever, chills. HEENT: Denies frequent headaches, cataracts, sinus issues. Pulmonary: Denies cough, wheezing. Cardiac: See HPI  GI: Positive for nausea with no vomiting. : Denies dysuria, hematuria, nocturia. Neuro: No seizures, tremor or notable weakness. Musculoskeletal: No arthralgias or myalgias. Heme: Denies easy bruising or bleeding. Psych: Denies history of depression or anxiety issues. Physical Exam  Visit Vitals  /67 (BP 1 Location: Right arm, BP Patient Position: Sitting)   Pulse (!) 118   Temp 97.2 °F (36.2 °C)   Resp 20   Ht 5' 7\" (1.702 m)   Wt 180 lb (81.6 kg)   SpO2 96%   BMI 28.19 kg/m²       Avani astudillo who is alert, oriented and in no acute respiratory distress. Head is normocephalic and atraumatic. . Trachea appears midline with no noted thyromegaly. Carotids are full without definite bruits. There is no JVD. Chest is clear to auscultation bilaterally. Cardiac auscultation reveals an rapid irregularly irregular rate and rhythm without significant murmur. Abdomen is soft and nontender. Extremities are without significant edema. Dorsalis pedis and posterior tibial pulseseasily palpable. . Skin is warm and dry.      Diagnostic Tests EKG: Pending  Labs:   Recent Labs     03/10/21  0434   WBC 8.9   HGB 12.2*   HCT 38.2        Recent Labs     03/10/21  0434      K 4.3      CO2 23   *   BUN 22*   CREA 1.79*   CA 8.7   MG 1.9   ALB 3.3*   ALT 27   INR 1.1       Recent Labs     03/10/21  0434   TROIQ <0.02   CPK 58   CKMB 2.6     Last Lipid:    Lab Results   Component Value Date/Time    Cholesterol, total 133 10/22/2018 01:55 PM    Cholesterol, total 122 10/22/2018 01:55 PM    HDL Cholesterol 44 10/22/2018 01:55 PM    HDL Cholesterol 41 10/22/2018 01:55 PM    LDL, calculated 52 10/22/2018 01:55 PM    LDL, calculated 47.4 10/22/2018 01:55 PM    Triglyceride 185 (H) 10/22/2018 01:55 PM    Triglyceride 168 (H) 10/22/2018 01:55 PM    CHOL/HDL Ratio 3.0 10/22/2018 01:55 PM    CHOL/HDL Ratio 3.0 10/22/2018 01:55 PM           We appreciate the opportunity to see Nathan Kitchen with you. We will follow along.     Rex Corrales MD  3/10/2021

## 2021-03-10 NOTE — PROGRESS NOTES
Problem: Discharge Planning  Goal: *Discharge to safe environment  Outcome: Resolved/Met      Home with home health    Reason for Admission:  New CHF / New A-fib with RVR     RUR Score: 24          PCP: First and Last name:  Laura Fay MD     Name of Practice:   Are you a current patient: Yes/No: Yes   Approximate date of last visit: about 3 months ago, has appt this Friday 3/12/2021. Can you do a virtual visit with your PCP:              Resources/supports as identified by patient/family:   Ins., PCP, family                Top Challenges facing patient (as identified by patient/family and CM): Finances/Medication cost?  MCR/BC ins      Transportation?  self              Support system or lack thereof?  family                     Living arrangements? Lives alone              Self-care/ADLs/Cognition?  independent          Current Advanced Directive/Advance Care Plan:  Full Code, states he has a will, asked him about MPOA, states none, does not want to complete one @ this time. Healthcare Decision Maker:   Click here to complete [address] including selection of the Healthcare Decision Maker Relationship (ie \"Primary\")      Plan for utilizing home health:  yes                   Transition of Care Plan:   Home with home health & out-pt follow up when medically stable. Chart reviewed. Met with pt., verified all demographics. NOK: Brook Reddy, son, who will pick him up @ discharge, if able, otherwise he will call an uber. Lives alone. Uses no DME. Independent with ADL's prior to admit, still drives. Asked him about home health nurse to come, he is agreeable. [facility] Provider list has been given to the patient and/or patient representative. Patient and/or patient representative has signed the [place] of Choice selecting [address] as their preference agency and a copy given.  Both Home Health Provider list and Freedom of Choice have been placed on the chart. Will cont to follow for further needs. Kiera Narvaez RN,ext [phone]. Patient has designated his son to participate in his/her discharge plan and to receive any needed information. Name: Owen Downs  Address:  Phone number:  [phone]    Care Management Interventions  PCP Verified by CM: Yes(Dr. Anthoyn Pascal, last seen about 3 months ago. )  Palliative Care Criteria Met (RRAT>21 & CHF Dx)?: No  Mode of Transport at Discharge: Other (see comment)(family)  Transition of Care Consult (CM Consult): Discharge Planning  Discharge Durable Medical Equipment: No  Physical Therapy Consult: No  Occupational Therapy Consult: No  Speech Therapy Consult: No  Current Support Network: Lives Alone  Confirm Follow Up Transport: Self  The Plan for Transition of Care is Related to the Following Treatment Goals : skilled nursing for CHF monitoring,assessment & education.   The Patient and/or Patient Representative was Provided with a Choice of Provider and Agrees with the Discharge Plan?: Yes  Name of the Patient Representative Who was Provided with a Choice of Provider and Agrees with the Discharge Plan: Amita Vogel  Discharge Location  Discharge Placement: Home with home health

## 2021-03-10 NOTE — ED PROVIDER NOTES
Bri Purcell is a 80 y.o. male with c/o difficulty urinating for last day. also with nausea for last day as well. h/o htn. no h/o cad, chf, afib. no sob, cp, palpitations. no abd pain, distention, leg, pain,swelling, pt called ems who reported only pulse of 70  on their monitor but when pt arrived at bedside pulse with irregular in 150's. no h/o uti or urinary retention. The history is provided by the patient, the EMS personnel and medical records.         Past Medical History:   Diagnosis Date    Alcohol dependence, episodic drinking behavior ([facility] [address]     Other and Unspecified    Anxiety state     Arthritis     BPH with obstruction/lower urinary tract symptoms     Carpal tunnel syndrome     Chronic kidney disease, stage III (moderate) (HCC)     Dyslipidemia     Elevated PSA 2011    Esophageal reflux     Essential hypertension, benign     Exercise tolerance finding July 2015    GERD (gastroesophageal reflux disease)     Gouty arthropathy     History of blood transfusion     Hypopotassemia     Hyposmolality and/or hyponatremia     Irritable bowel syndrome     Knee pain, left     Lumbago     Lumbar back pain     S/P TKR (total knee replacement) 2013    Scoliosis deformity of spine     Sleep apnea        Past Surgical History:   Procedure Laterality Date    HX CHOLECYSTECTOMY      HX HERNIA REPAIR      HX OTHER SURGICAL  07.31.2015    HX ADJACENT TISSUE TRANSFER/REARRANGEMENT         Family History:   Problem Relation Age of Onset    Hypertension Father     Diabetes Mother        Social History     Socioeconomic History    Marital status:      Spouse name: Not on file    Number of children: Not on file    Years of education: Not on file    Highest education level: Not on file   Occupational History    Not on file   Social Needs    Financial resource strain: Not on file    Food insecurity     Worry: Not on file     Inability: Not on file    Transportation needs     Medical: Not on file     Non-medical: Not on file   Tobacco Use    Smoking status: Former Smoker    Smokeless tobacco: Never Used   Substance and Sexual Activity    Alcohol use: Yes     Alcohol/week: 3.0 standard drinks     Types: 3 Cans of beer per week    Drug use: No    Sexual activity: Not on file   Lifestyle    Physical activity     Days per week: Not on file     Minutes per session: Not on file    Stress: Not on file   Relationships    Social connections     Talks on phone: Not on file     Gets together: Not on file     Attends Restorationist service: Not on file     Active member of club or organization: Not on file     Attends meetings of clubs or organizations: Not on file     Relationship status: Not on file    Intimate partner violence     Fear of current or ex partner: Not on file     Emotionally abused: Not on file     Physically abused: Not on file     Forced sexual activity: Not on file   Other Topics Concern    Not on file   Social History Narrative    Not on file         ALLERGIES: Patient has no known allergies. Review of Systems   Constitutional: Negative for fever. HENT: Negative for sore throat and trouble swallowing. Eyes: Negative for visual disturbance. Respiratory: Negative for cough and shortness of breath. Cardiovascular: Negative for chest pain and palpitations. Gastrointestinal: Negative for abdominal pain. Genitourinary: Positive for difficulty urinating. Negative for hematuria. Musculoskeletal: Positive for back pain. Skin: Negative for rash. Allergic/Immunologic: Negative for immunocompromised state. Neurological: Negative for syncope. Hematological: Does not bruise/bleed easily. Psychiatric/Behavioral: Positive for sleep disturbance.        Vitals:    03/10/21 0604 03/10/21 0608 03/10/21 0634 03/10/21 0645   BP: 98/77  111/60 (!) 131/47   Pulse: (!) 154  (!) 125 (!) 129   Resp: 18  (!) 33 24   Temp: 98.1 °F (36.7 °C)      SpO2: 98% 100% 92% 92%   Weight: 81.6 kg (180 lb)      Height: 5' 7\" (1.702 m)               Physical Exam  Vitals signs and nursing note reviewed. Constitutional:       General: He is in acute distress. Appearance: He is not ill-appearing, toxic-appearing or diaphoretic. HENT:      Head: Normocephalic and atraumatic. Right Ear: External ear normal.      Left Ear: External ear normal.      Nose: Nose normal.      Mouth/Throat:      Mouth: Mucous membranes are moist.      Pharynx: No oropharyngeal exudate. Eyes:      Conjunctiva/sclera: Conjunctivae normal.   Neck:      Musculoskeletal: Normal range of motion. Cardiovascular:      Rate and Rhythm: Tachycardia present. Rhythm irregular. Heart sounds: Normal heart sounds. Pulmonary:      Effort: Pulmonary effort is normal. No respiratory distress. Breath sounds: Normal breath sounds. No rales. Abdominal:      General: There is no distension. Palpations: Abdomen is soft. Tenderness: There is no abdominal tenderness. There is no guarding. Musculoskeletal: Normal range of motion. Right lower leg: No edema. Left lower leg: No edema. Skin:     General: Skin is warm and dry. Capillary Refill: Capillary refill takes less than 2 seconds. Neurological:      Mental Status: He is alert and oriented to person, place, and time.    Psychiatric:         Behavior: Behavior normal.          MDM       Procedures    Vitals:  Patient Vitals for the past 12 hrs:   Temp Pulse Resp BP SpO2   03/10/21 0645  (!) 129 24 (!) 131/47 92 %   03/10/21 0634  (!) 125 (!) 33 111/60 92 %   03/10/21 0608     100 %   03/10/21 0604 98.1 °F (36.7 °C) (!) 154 18 98/77 98 %   03/10/21 0600  (!) 115  111/60          Medications ordered:   Medications   dilTIAZem (CARDIZEM) 125 mg in 0.9% sodium chloride 125 mL infusion (10 mg/hr IntraVENous Rate Change 3/10/21 0659)   dilTIAZem (CARDIZEM) injection 10 mg (10 mg IntraVENous Given 3/10/21 0600)   aspirin chewable tablet 162 mg (162 mg Oral Given 3/10/21 0600)   iopamidoL (ISOVUE 300) 61 % contrast injection 100 mL (80 mL IntraVENous Given 3/10/21 0607)   ondansetron (ZOFRAN) injection 4 mg (4 mg IntraVENous Given 3/10/21 0629)   furosemide (LASIX) injection 40 mg (40 mg IntraVENous Given 3/10/21 0654)   metoprolol (LOPRESSOR) injection 2.5 mg (2.5 mg IntraVENous Given 3/10/21 0522)   metoprolol tartrate (LOPRESSOR) tablet 12.5 mg (12.5 mg Oral Given 3/10/21 0451)   metoprolol (LOPRESSOR) injection 2.5 mg (2.5 mg IntraVENous Given 3/10/21 0500)   metoprolol (LOPRESSOR) injection 2.5 mg (2.5 mg IntraVENous Given 3/10/21 0450)   0.9% sodium chloride infusion 1,000 mL (1,000 mL IntraVENous New Bag 3/10/21 0450)         Lab findings:  Recent Results (from the past 12 hour(s))   PROTHROMBIN TIME + INR    Collection Time: 03/10/21  4:34 AM   Result Value Ref Range    Prothrombin time 14.4 11.5 - 15.2 sec    INR 1.1 0.8 - 1.2     PTT    Collection Time: 03/10/21  4:34 AM   Result Value Ref Range    aPTT 32.6 23.0 - 36.4 SEC   CBC WITH AUTOMATED DIFF    Collection Time: 03/10/21  4:34 AM   Result Value Ref Range    WBC 8.9 4.6 - 13.2 K/uL    RBC 3.92 (L) 4.70 - 5.50 M/uL    HGB 12.2 (L) 13.0 - 16.0 g/dL    HCT 38.2 36.0 - 48.0 %    MCV 97.4 (H) 74.0 - 97.0 FL    MCH 31.1 24.0 - 34.0 PG    MCHC 31.9 31.0 - 37.0 g/dL    RDW 13.6 11.6 - 14.5 %    PLATELET 595 111 - 982 K/uL    MPV 11.2 9.2 - 11.8 FL    NEUTROPHILS 76 (H) 40 - 73 %    LYMPHOCYTES 15 (L) 21 - 52 %    MONOCYTES 8 3 - 10 %    EOSINOPHILS 1 0 - 5 %    BASOPHILS 0 0 - 2 %    ABS. NEUTROPHILS 6.8 1.8 - 8.0 K/UL    ABS. LYMPHOCYTES 1.3 0.9 - 3.6 K/UL    ABS. MONOCYTES 0.7 0.05 - 1.2 K/UL    ABS. EOSINOPHILS 0.1 0.0 - 0.4 K/UL    ABS.  BASOPHILS 0.0 0.0 - 0.1 K/UL    DF AUTOMATED     CPK-MB PANEL    Collection Time: 03/10/21  4:34 AM   Result Value Ref Range    CK - MB 2.6 <3.6 ng/ml    CK-MB Index 4.5 (H) 0.0 - 4.0 %    CK 58 39 - 308 U/L    Troponin-I, QT <0.02 0.0 - 0.045 NG/ML   D DIMER Collection Time: 03/10/21  4:34 AM   Result Value Ref Range    D DIMER 2.16 (H) <0.46 ug/ml(FEU)   MAGNESIUM    Collection Time: 03/10/21  4:34 AM   Result Value Ref Range    Magnesium 1.9 1.6 - 2.6 mg/dL   METABOLIC PANEL, COMPREHENSIVE    Collection Time: 03/10/21  4:34 AM   Result Value Ref Range    Sodium 137 136 - 145 mmol/L    Potassium 4.3 3.5 - 5.5 mmol/L    Chloride 107 100 - 111 mmol/L    CO2 23 21 - 32 mmol/L    Anion gap 7 3.0 - 18 mmol/L    Glucose 155 (H) 74 - 99 mg/dL    BUN 22 (H) 7.0 - 18 MG/DL    Creatinine 1.79 (H) 0.6 - 1.3 MG/DL    BUN/Creatinine ratio 12 12 - 20      GFR est AA 44 (L) >60 ml/min/1.73m2    GFR est non-AA 36 (L) >60 ml/min/1.73m2    Calcium 8.7 8.5 - 10.1 MG/DL    Bilirubin, total 1.2 (H) 0.2 - 1.0 MG/DL    ALT (SGPT) 27 16 - 61 U/L    AST (SGOT) 14 10 - 38 U/L    Alk. phosphatase 107 45 - 117 U/L    Protein, total 6.1 (L) 6.4 - 8.2 g/dL    Albumin 3.3 (L) 3.4 - 5.0 g/dL    Globulin 2.8 2.0 - 4.0 g/dL    A-G Ratio 1.2 0.8 - 1.7     NT-PRO BNP    Collection Time: 03/10/21  4:34 AM   Result Value Ref Range    NT pro-BNP 8,581 (H) 0 - 1,800 PG/ML   TSH 3RD GENERATION    Collection Time: 03/10/21  4:34 AM   Result Value Ref Range    TSH 1.30 0.36 - 3.74 uIU/mL   SARS-COV-2    Collection Time: 03/10/21  6:30 AM   Result Value Ref Range    SARS-CoV-2 Please find results under separate order         EKG interpretation by ED Physician:  cardiac monitor: tachy with widened qrs, irregular    EKG with wide-complex irregular tachycardia which is new from previous EKGs    X-Ray, CT or other radiology findings or impressions:  CTA CHEST W OR W WO CONT    (Results Pending)   XR CHEST SNGL V    (Results Pending)   Chest x-ray: Cardiomegaly with possible interstitial edema  cta chest:   Progress notes, Consult notes or additional Procedure notes:   Patient is denying chest pain or shortness of breath.   Attempted gave patient 3 different doses of IV Lopressor without significant change in his heart rate. Blood pressures remained stable. Will switch over to IV Cardizem for suspected atrial fibrillation    Of note in patient's labs cardiac enzymes are normal along with normal potassium. Patient noted to have elevated proBNP of 8500. D-dimer was also elevated. Will get CT of chest to further evaluate for acute pulmonary process to include PE possible pneumonia or edema. D/w luana almanza on call cardiology who will consult  D/w Dr Vivi Crews on call for hospitalist who will accept    Reevaluation of patient:   stable    Disposition:  Diagnosis:   1. Atrial fibrillation with rapid ventricular response (HCC)    2. Chest pain    3. Acute congestive heart failure, unspecified heart failure type ([address]    4. Acute pulmonary edema (HCC)        Disposition: admit    Follow-up Information    None           Patient's Medications   Start Taking    No medications on file   Continue Taking    AMLODIPINE (NORVASC) 5 MG TABLET    Take 1 Tab by mouth daily. ASPIRIN (ASPIRIN) 325 MG TABLET    Take 1 Tab by mouth daily. ATORVASTATIN (LIPITOR) 40 MG TABLET    Take 1 Tab by mouth nightly. CHOLECALCIFEROL (VITAMIN D3) 1,000 UNIT TABLET    Take 2 Tabs by mouth daily. CLONAZEPAM (KLONOPIN) 0.5 MG TABLET    Take 1 Tab by mouth two (2) times daily as needed. CLONIDINE (CATAPRES) 0.1 MG TABLET    Take 1 Tab by mouth two (2) times a day. CLOPIDOGREL (PLAVIX) 75 MG TAB    Take 1 Tab by mouth daily. CYCLOBENZAPRINE (FLEXERIL) 10 MG TABLET    10 mg. DOXEPIN (SINEQUAN) 25 MG CAPSULE    Take 1 Cap by mouth nightly. FENOFIBRATE NANOCRYSTALLIZED (TRICOR) 48 MG TABLET    Take 1 Tab by mouth daily. HYDROCODONE-ACETAMINOPHEN (NORCO) 5-325 MG PER TABLET    Take 1 Tab by Mouth Every 6 Hours As Needed for Pain. METOPROLOL (LOPRESSOR) 100 MG TABLET    Take 1 Tab by mouth two (2) times a day. MULTIVITAMIN PO    Take  by Mouth Once a Day.     OMEPRAZOLE DELAYED RELEASE (PRILOSEC D/R) 20 MG TABLET    Take 20 mg by mouth daily. OTHER,NON-FORMULARY,    Please dispense a 5 ml solution of Trimix consisting of PGE 20 mcg/Papaverine 30 mg/Phentolamine 2 mg per ml. Patient is to inject 0.4-0.5 ml. OXYCODONE IR (ROXICODONE) 5 MG IMMEDIATE RELEASE TABLET    Take 1 Tab by mouth every four (4) hours as needed. POTASSIUM CHLORIDE SR (K-TAB) 20 MEQ TABLET    Take 2 Tabs by mouth daily. SUCRALFATE (CARAFATE) 1 GRAM TABLET    1 g. SYRINGE WITH NEEDLE,DISP, TRAY (ALLERGIST TRAY 1CC 27GX1/2\") 1 ML 27 X 1/2\" TRAY    Please dispense 25 each/one tray BD brand allergy syringes. To be used as directed. TAMSULOSIN (FLOMAX) 0.4 MG CAPSULE    Take 1 Cap by mouth daily. TERAZOSIN (HYTRIN) 10 MG CAPSULE    Take 1 Cap by mouth nightly. TRI MIX COMPOUND    PGE 20 mcg/ml  Papaverine 30 mg/ml   Phentolamine 2   mg/ml Inject 0.4-0.5 ml prn    ZOLPIDEM (AMBIEN) 5 MG TABLET    Take 1 Tab by mouth nightly as needed for Sleep.    These Medications have changed    No medications on file   Stop Taking    No medications on file

## 2021-03-11 PROBLEM — I50.22 SYSTOLIC CHF, CHRONIC (HCC): Status: ACTIVE | Noted: 2021-03-11

## 2021-03-11 PROBLEM — N17.9 ACUTE KIDNEY INJURY (HCC): Status: ACTIVE | Noted: 2021-03-11

## 2021-03-11 LAB
ANION GAP SERPL CALC-SCNC: 8 MMOL/L (ref 3–18)
BASOPHILS # BLD: 0 K/UL (ref 0–0.1)
BASOPHILS NFR BLD: 0 % (ref 0–2)
BUN SERPL-MCNC: 24 MG/DL (ref 7–18)
BUN/CREAT SERPL: 12 (ref 12–20)
CALCIUM SERPL-MCNC: 8.1 MG/DL (ref 8.5–10.1)
CHLORIDE SERPL-SCNC: 105 MMOL/L (ref 100–111)
CO2 SERPL-SCNC: 26 MMOL/L (ref 21–32)
CREAT SERPL-MCNC: 2.07 MG/DL (ref 0.6–1.3)
DIFFERENTIAL METHOD BLD: ABNORMAL
EOSINOPHIL # BLD: 0 K/UL (ref 0–0.4)
EOSINOPHIL NFR BLD: 0 % (ref 0–5)
ERYTHROCYTE [DISTWIDTH] IN BLOOD BY AUTOMATED COUNT: 13.2 % (ref 11.6–14.5)
GLUCOSE SERPL-MCNC: 145 MG/DL (ref 74–99)
HCT VFR BLD AUTO: 33 % (ref 36–48)
HGB BLD-MCNC: 10.4 G/DL (ref 13–16)
LYMPHOCYTES # BLD: 0.4 K/UL (ref 0.9–3.6)
LYMPHOCYTES NFR BLD: 6 % (ref 21–52)
MAGNESIUM SERPL-MCNC: 2.2 MG/DL (ref 1.6–2.6)
MCH RBC QN AUTO: 30.5 PG (ref 24–34)
MCHC RBC AUTO-ENTMCNC: 31.5 G/DL (ref 31–37)
MCV RBC AUTO: 96.8 FL (ref 74–97)
MONOCYTES # BLD: 0.7 K/UL (ref 0.05–1.2)
MONOCYTES NFR BLD: 11 % (ref 3–10)
NEUTS SEG # BLD: 5.5 K/UL (ref 1.8–8)
NEUTS SEG NFR BLD: 83 % (ref 40–73)
PHOSPHATE SERPL-MCNC: 4.1 MG/DL (ref 2.5–4.9)
PLATELET # BLD AUTO: 190 K/UL (ref 135–420)
PMV BLD AUTO: 10.8 FL (ref 9.2–11.8)
POTASSIUM SERPL-SCNC: 4.6 MMOL/L (ref 3.5–5.5)
RBC # BLD AUTO: 3.41 M/UL (ref 4.7–5.5)
SODIUM SERPL-SCNC: 139 MMOL/L (ref 136–145)
TROPONIN I SERPL-MCNC: 0.02 NG/ML (ref 0–0.04)
WBC # BLD AUTO: 6.7 K/UL (ref 4.6–13.2)

## 2021-03-11 PROCEDURE — 74011000258 HC RX REV CODE- 258: Performed by: INTERNAL MEDICINE

## 2021-03-11 PROCEDURE — 2709999900 HC NON-CHARGEABLE SUPPLY

## 2021-03-11 PROCEDURE — 65270000029 HC RM PRIVATE

## 2021-03-11 PROCEDURE — 85025 COMPLETE CBC W/AUTO DIFF WBC: CPT

## 2021-03-11 PROCEDURE — 93005 ELECTROCARDIOGRAM TRACING: CPT

## 2021-03-11 PROCEDURE — 36415 COLL VENOUS BLD VENIPUNCTURE: CPT

## 2021-03-11 PROCEDURE — 74011000250 HC RX REV CODE- 250: Performed by: HOSPITALIST

## 2021-03-11 PROCEDURE — 84100 ASSAY OF PHOSPHORUS: CPT

## 2021-03-11 PROCEDURE — 74011250636 HC RX REV CODE- 250/636: Performed by: INTERNAL MEDICINE

## 2021-03-11 PROCEDURE — 74011000250 HC RX REV CODE- 250: Performed by: INTERNAL MEDICINE

## 2021-03-11 PROCEDURE — 80048 BASIC METABOLIC PNL TOTAL CA: CPT

## 2021-03-11 PROCEDURE — 77010033678 HC OXYGEN DAILY

## 2021-03-11 PROCEDURE — 84484 ASSAY OF TROPONIN QUANT: CPT

## 2021-03-11 PROCEDURE — 74011250636 HC RX REV CODE- 250/636: Performed by: HOSPITALIST

## 2021-03-11 PROCEDURE — 99232 SBSQ HOSP IP/OBS MODERATE 35: CPT | Performed by: INTERNAL MEDICINE

## 2021-03-11 PROCEDURE — 83735 ASSAY OF MAGNESIUM: CPT

## 2021-03-11 PROCEDURE — 74011250637 HC RX REV CODE- 250/637: Performed by: INTERNAL MEDICINE

## 2021-03-11 RX ORDER — METOPROLOL TARTRATE 5 MG/5ML
2.5 INJECTION INTRAVENOUS ONCE
Status: COMPLETED | OUTPATIENT
Start: 2021-03-11 | End: 2021-03-11

## 2021-03-11 RX ORDER — ENOXAPARIN SODIUM 100 MG/ML
30 INJECTION SUBCUTANEOUS DAILY
Status: DISCONTINUED | OUTPATIENT
Start: 2021-03-11 | End: 2021-03-12 | Stop reason: ALTCHOICE

## 2021-03-11 RX ADMIN — DILTIAZEM HYDROCHLORIDE 12.5 MG/HR: 5 INJECTION INTRAVENOUS at 01:47

## 2021-03-11 RX ADMIN — FUROSEMIDE 40 MG: 10 INJECTION, SOLUTION INTRAMUSCULAR; INTRAVENOUS at 05:50

## 2021-03-11 RX ADMIN — PROMETHAZINE HYDROCHLORIDE 12.5 MG: 25 TABLET ORAL at 17:29

## 2021-03-11 RX ADMIN — Medication 10 ML: at 15:07

## 2021-03-11 RX ADMIN — Medication 10 ML: at 05:49

## 2021-03-11 RX ADMIN — DILTIAZEM HYDROCHLORIDE 5 MG/HR: 5 INJECTION INTRAVENOUS at 12:42

## 2021-03-11 RX ADMIN — PANTOPRAZOLE SODIUM 20 MG: 20 TABLET, DELAYED RELEASE ORAL at 08:52

## 2021-03-11 RX ADMIN — ENOXAPARIN SODIUM 30 MG: 30 INJECTION SUBCUTANEOUS at 08:54

## 2021-03-11 RX ADMIN — CLONAZEPAM 0.5 MG: 0.5 TABLET ORAL at 08:53

## 2021-03-11 RX ADMIN — ASPIRIN 81 MG: 81 TABLET, CHEWABLE ORAL at 08:52

## 2021-03-11 RX ADMIN — METOPROLOL TARTRATE 100 MG: 50 TABLET, FILM COATED ORAL at 08:53

## 2021-03-11 RX ADMIN — ONDANSETRON 4 MG: 2 INJECTION INTRAMUSCULAR; INTRAVENOUS at 06:25

## 2021-03-11 RX ADMIN — METOPROLOL TARTRATE 100 MG: 50 TABLET, FILM COATED ORAL at 17:16

## 2021-03-11 RX ADMIN — CHOLECALCIFEROL (VITAMIN D3) 25 MCG (1,000 UNIT) TABLET 2000 UNITS: at 08:56

## 2021-03-11 RX ADMIN — METOPROLOL TARTRATE 2.5 MG: 5 INJECTION, SOLUTION INTRAVENOUS at 06:47

## 2021-03-11 NOTE — PROGRESS NOTES
Problem: Pain  Goal: *Control of Pain  Outcome: Progressing Towards Goal     Problem: Patient Education: Go to Patient Education Activity  Goal: Patient/Family Education  Outcome: Progressing Towards Goal     Problem: Falls - Risk of  Goal: *Absence of Falls  Description: Document Dolores Turner Fall Risk and appropriate interventions in the flowsheet.   Outcome: Progressing Towards Goal  Note: Fall Risk Interventions:  Mobility Interventions: Bed/chair exit alarm, Patient to call before getting OOB         Medication Interventions: Patient to call before getting OOB                   Problem: Patient Education: Go to Patient Education Activity  Goal: Patient/Family Education  Outcome: Progressing Towards Goal

## 2021-03-11 NOTE — PROGRESS NOTES
0710- Bedside and Verbal shift change report given to ARIS Matias (oncoming nurse) by Phoebe Oglesby RN (offgoing nurse). Report included the following information SBAR, Kardex, Intake/Output, MAR and Recent Results. 8236- call from telemetry, Pt just had 6 beats of V tach, Pt is sitting on the side of the bed, states that he is tired of being sick. He is asymptomatic, no complaints of shortness of breath, palpitations or chest pain. Report from shift change states that the pt is now in NSR. Cardizem drip 15ml/hr still infusing. Will continue to monitor. 4310- AM meds given, Klonopin tab PRN given, pt tolerated well. EKG completed, placed in pt chart. Pt resting in bed with no complaints of pain at this time. 1476- Rate change for Cardizem drip now at 5ml/hr. 1114- Reassessment and vitals completed. [address] bag started, assisted by Saran Jean Baptiste, [address]- call from telemetry, Pt had another 6 beats of V tach. States that the beats were irregular, checked on pt, he states that he is feeling fine. Pt is resting in bed, will perfect serve Dr Jann Jacques served , he is aware. Will continue to monitor closely. 1508- Vitals completed. 1559- Bedside and Verbal shift change report given to Saran Jean Baptiste RN, by ARIS Matias (offgoing nurse). Report included the following information SBAR, Kardex, ED Summary, Intake/Output, MAR and Recent Results.

## 2021-03-11 NOTE — PROGRESS NOTES
Progress Note      Patient: Teresa Hidalgo               Sex: male          DOA: 3/10/2021       YOB: 1934      Age:  80 y.o.        LOS:  LOS: 1 day             CHIEF COMPLAINT:  A fib with RVR converted to NSR in the setting of CHF and Cerebrovascular disease    Subjective:     Patient is awake and alert. No distress, no CP, no SOB    Objective:      Visit Vitals  /62 (BP 1 Location: Left upper arm, BP Patient Position: At rest)   Pulse 69   Temp 98.2 °F (36.8 °C)   Resp 20   Ht 5' 7\" (1.702 m)   Wt 81.6 kg (180 lb)   SpO2 93%   BMI 28.19 kg/m²       Physical Exam:  Gen:  Patient is in no distress. No complaint  HEENT and Neck:  PERRL, No cervical tenderness or masses  Lungs:  Clear bilaterally. No rales, no wheeze. Normal effort. Heart:  Regular Rate and Rhythm. No murmur or gallop. No JVD. No edema.   Abdomen:  Soft, non tender, no masses, no Hepatosplenomegally  Extremities:  No digital clubbing, no cyanosis  Neuro:  Alert and oriented, Normal affect, Cranial nerves intact, No sensory deficits      Lab/Data Reviewed:  BMP:   Lab Results   Component Value Date/Time     03/11/2021 12:15 AM    K 4.6 03/11/2021 12:15 AM     03/11/2021 12:15 AM    CO2 26 03/11/2021 12:15 AM    AGAP 8 03/11/2021 12:15 AM     (H) 03/11/2021 12:15 AM    BUN 24 (H) 03/11/2021 12:15 AM    CREA 2.07 (H) 03/11/2021 12:15 AM    GFRAA 37 (L) 03/11/2021 12:15 AM    GFRNA 31 (L) 03/11/2021 12:15 AM     CBC:   Lab Results   Component Value Date/Time    WBC 6.7 03/11/2021 12:15 AM    HGB 10.4 (L) 03/11/2021 12:15 AM    HCT 33.0 (L) 03/11/2021 12:15 AM     03/11/2021 12:15 AM           Assessment/Plan     Principal Problem:    Atrial fibrillation with rapid ventricular response ([address] (3/10/2021)    Active Problems:    Systolic CHF, chronic ([facility] (3/11/2021)      Overview: LVEF 45%      Acute kidney injury ([facility] (3/11/2021)      Essential hypertension, benign (11/18/2013)      Cerebrovascular accident (CVA) due to embolism of left middle cerebral artery ([facility] [address] (10/22/2018)      Dyslipidemia (3/10/2021)      Esophageal reflux (11/18/2013)      BPH (benign prostatic hyperplasia) (11/18/2013)        Plan:  Patient appears to be in NSR currently. He is amenable to anticoagulation, will begin Eliquis  Following renal function, creatinine is increased today. Will check again tomorrow. Will not diuresis aggressively  BP control  Mobilize and follow HR and rhythm response.

## 2021-03-11 NOTE — PROGRESS NOTES
Received pt alert and oriented x 4 in pleasant spirits. Cardizem gtt infusing at 12.5 ccs /hr. Pt requests something for his nerves. [facility] No s/s of distress or discomfort. Will continue to monitor. 2000 medicated with prn klonopin at this time. 2213 medicated with prn norco and mylanta at this time for c/o stomach discomfort. 2330 pt resting quietly with no further complaints. 0440 pts heart rate has been 90s to 120s this shift with afib on the monitor. Heart rate now sustaining 120s to 140s . Spoke with Dr. Giorgi Hall. N.O. to up cardizem to 15. cardizem gtt increased to 15 cc/hr. Also spoke with pt regarding order from yesterday to place ravi catheter . He states he refused to have a ravi placed. 9995 pt attempted to drink gingerale and it immediately made him \" sick to his stomach\" . Medicated with prn zofran. Heart rate elevated at time of incident. 4561 pts heart rate sustaining 140s to 170s at this time. soke with MD. N.ANABELLE received for 2.5mg IV metoprolol now. 0650 pts heart rate down to 80s at this time.     7005 telemetry called stating pt converted to sinus rhythm at this time

## 2021-03-11 NOTE — PROGRESS NOTES
Problem: Pain  Goal: *Control of Pain  Outcome: Progressing Towards Goal     Problem: Patient Education: Go to Patient Education Activity  Goal: Patient/Family Education  Outcome: Progressing Towards Goal     Problem: Falls - Risk of  Goal: *Absence of Falls  Description: Document Kenneth Woo Fall Risk and appropriate interventions in the flowsheet.   Outcome: Progressing Towards Goal  Note: Fall Risk Interventions:            Medication Interventions: Patient to call before getting OOB, Teach patient to arise slowly                   Problem: Afib Pathway: Day 1  Goal: Off Pathway (Use only if patient is Off Pathway)  Outcome: Progressing Towards Goal  Goal: Activity/Safety  Outcome: Progressing Towards Goal  Goal: Consults, if ordered  Outcome: Progressing Towards Goal  Goal: Diagnostic Test/Procedures  Outcome: Progressing Towards Goal  Goal: Nutrition/Diet  Outcome: Progressing Towards Goal  Goal: Discharge Planning  Outcome: Progressing Towards Goal  Goal: Medications  Outcome: Progressing Towards Goal  Goal: Respiratory  Outcome: Progressing Towards Goal  Goal: Treatments/Interventions/Procedures  Outcome: Progressing Towards Goal  Goal: Psychosocial  Outcome: Progressing Towards Goal  Goal: *Optimal pain control at patient's stated goal  Outcome: Progressing Towards Goal  Goal: *Hemodynamically stable  Outcome: Progressing Towards Goal  Goal: *Stable cardiac rhythm  Outcome: Progressing Towards Goal  Goal: *Lungs clear or at baseline  Outcome: Progressing Towards Goal  Goal: *Labs within defined limits  Outcome: Progressing Towards Goal  Goal: *Describes available resources and support systems  Outcome: Progressing Towards Goal

## 2021-03-11 NOTE — PROGRESS NOTES
Problem: Pain  Goal: *Control of Pain  Outcome: Progressing Towards Goal     Problem: Patient Education: Go to Patient Education Activity  Goal: Patient/Family Education  Outcome: Progressing Towards Goal     Problem: Falls - Risk of  Goal: *Absence of Falls  Description: Document Bard  Fall Risk and appropriate interventions in the flowsheet.   Outcome: Progressing Towards Goal  Note: Fall Risk Interventions:  Mobility Interventions: Bed/chair exit alarm, Patient to call before getting OOB         Medication Interventions: Patient to call before getting OOB, Teach patient to arise slowly                   Problem: Patient Education: Go to Patient Education Activity  Goal: Patient/Family Education  Outcome: Progressing Towards Goal     Problem: Afib Pathway: Day 1  Goal: Off Pathway (Use only if patient is Off Pathway)  Outcome: Progressing Towards Goal  Goal: Activity/Safety  Outcome: Progressing Towards Goal  Goal: Consults, if ordered  Outcome: Progressing Towards Goal  Goal: Diagnostic Test/Procedures  Outcome: Progressing Towards Goal  Goal: Nutrition/Diet  Outcome: Progressing Towards Goal  Goal: Discharge Planning  Outcome: Progressing Towards Goal  Goal: Medications  Outcome: Progressing Towards Goal  Goal: Respiratory  Outcome: Progressing Towards Goal  Goal: Treatments/Interventions/Procedures  Outcome: Progressing Towards Goal  Goal: Psychosocial  Outcome: Progressing Towards Goal  Goal: *Optimal pain control at patient's stated goal  Outcome: Progressing Towards Goal  Goal: *Hemodynamically stable  Outcome: Progressing Towards Goal  Goal: *Stable cardiac rhythm  Outcome: Progressing Towards Goal  Goal: *Lungs clear or at baseline  Outcome: Progressing Towards Goal  Goal: *Labs within defined limits  Outcome: Progressing Towards Goal  Goal: *Describes available resources and support systems  Outcome: Progressing Towards Goal     Problem: Afib Pathway: Day 2  Goal: Off Pathway (Use only if patient is Off Pathway)  Outcome: Progressing Towards Goal  Goal: Activity/Safety  Outcome: Progressing Towards Goal  Goal: Consults, if ordered  Outcome: Progressing Towards Goal  Goal: Diagnostic Test/Procedures  Outcome: Progressing Towards Goal  Goal: Nutrition/Diet  Outcome: Progressing Towards Goal  Goal: Discharge Planning  Outcome: Progressing Towards Goal  Goal: Medications  Outcome: Progressing Towards Goal  Goal: Respiratory  Outcome: Progressing Towards Goal  Goal: Treatments/Interventions/Procedures  Outcome: Progressing Towards Goal  Goal: Psychosocial  Outcome: Progressing Towards Goal  Goal: *Hemodynamically stable  Outcome: Progressing Towards Goal  Goal: *Optimal pain control at patient's stated goal  Outcome: Progressing Towards Goal  Goal: *Stable cardiac rhythm  Outcome: Progressing Towards Goal  Goal: *Lungs clear or at baseline  Outcome: Progressing Towards Goal  Goal: *Describes available resources and support systems  Outcome: Progressing Towards Goal     Problem: Afib Pathway: Day 3  Goal: Off Pathway (Use only if patient is Off Pathway)  Outcome: Progressing Towards Goal  Goal: Activity/Safety  Outcome: Progressing Towards Goal  Goal: Diagnostic Test/Procedures  Outcome: Progressing Towards Goal  Goal: Nutrition/Diet  Outcome: Progressing Towards Goal  Goal: Discharge Planning  Outcome: Progressing Towards Goal  Goal: Medications  Outcome: Progressing Towards Goal  Goal: Respiratory  Outcome: Progressing Towards Goal  Goal: Treatments/Interventions/Procedures  Outcome: Progressing Towards Goal  Goal: Psychosocial  Outcome: Progressing Towards Goal     Problem: Afib: Discharge Outcomes (not in CAT)  Goal: *Hemodynamically stable  Outcome: Progressing Towards Goal  Goal: *Stable cardiac rhythm  Outcome: Progressing Towards Goal  Goal: *Lungs clear or at baseline  Outcome: Progressing Towards Goal  Goal: *Optimal pain control at patient's stated goal  Outcome: Progressing Towards Goal  Goal: *Identifies cardiac risk factors  Outcome: Progressing Towards Goal  Goal: *Verbalizes home exercise program, activity guidelines, cardiac precautions  Outcome: Progressing Towards Goal  Goal: *Verbalizes understanding and describes prescribed diet  Outcome: Progressing Towards Goal  Goal: *Verbalizes understanding and describes medication purposes and frequencies  Outcome: Progressing Towards Goal  Goal: *Anxiety reduced or absent  Outcome: Progressing Towards Goal  Goal: *Understands and describes signs and symptoms to report to providers(Stroke Metric)  Outcome: Progressing Towards Goal  Goal: *Describes follow-up/return visits to physicians  Outcome: Progressing Towards Goal  Goal: *Describes available resources and support systems  Outcome: Progressing Towards Goal  Goal: *Influenza immunization  Outcome: Progressing Towards Goal  Goal: *Pneumococcal immunization  Outcome: Progressing Towards Goal  Goal: *Describes smoking cessation resources  Outcome: Progressing Towards Goal     Problem: Heart Failure: Day 1  Goal: Off Pathway (Use only if patient is Off Pathway)  Outcome: Progressing Towards Goal  Goal: Activity/Safety  Outcome: Progressing Towards Goal  Goal: Consults, if ordered  Outcome: Progressing Towards Goal  Goal: Diagnostic Test/Procedures  Outcome: Progressing Towards Goal  Goal: Nutrition/Diet  Outcome: Progressing Towards Goal  Goal: Discharge Planning  Outcome: Progressing Towards Goal  Goal: Medications  Outcome: Progressing Towards Goal  Goal: Respiratory  Outcome: Progressing Towards Goal  Goal: Treatments/Interventions/Procedures  Outcome: Progressing Towards Goal  Goal: Psychosocial  Outcome: Progressing Towards Goal  Goal: *Oxygen saturation within defined limits  Outcome: Progressing Towards Goal  Goal: *Hemodynamically stable  Outcome: Progressing Towards Goal  Goal: *Optimal pain control at patient's stated goal  Outcome: Progressing Towards Goal  Goal: *Anxiety reduced or absent  Outcome: Progressing Towards Goal     Problem: Heart Failure: Day 2  Goal: Off Pathway (Use only if patient is Off Pathway)  Outcome: Progressing Towards Goal  Goal: Activity/Safety  Outcome: Progressing Towards Goal  Goal: Consults, if ordered  Outcome: Progressing Towards Goal  Goal: Diagnostic Test/Procedures  Outcome: Progressing Towards Goal  Goal: Nutrition/Diet  Outcome: Progressing Towards Goal  Goal: Discharge Planning  Outcome: Progressing Towards Goal  Goal: Medications  Outcome: Progressing Towards Goal  Goal: Respiratory  Outcome: Progressing Towards Goal  Goal: Treatments/Interventions/Procedures  Outcome: Progressing Towards Goal  Goal: Psychosocial  Outcome: Progressing Towards Goal  Goal: *Oxygen saturation within defined limits  Outcome: Progressing Towards Goal  Goal: *Hemodynamically stable  Outcome: Progressing Towards Goal  Goal: *Optimal pain control at patient's stated goal  Outcome: Progressing Towards Goal  Goal: *Anxiety reduced or absent  Outcome: Progressing Towards Goal  Goal: *Demonstrates progressive activity  Outcome: Progressing Towards Goal     Problem: Heart Failure: Day 3  Goal: Off Pathway (Use only if patient is Off Pathway)  Outcome: Progressing Towards Goal  Goal: Activity/Safety  Outcome: Progressing Towards Goal  Goal: Diagnostic Test/Procedures  Outcome: Progressing Towards Goal  Goal: Nutrition/Diet  Outcome: Progressing Towards Goal  Goal: Discharge Planning  Outcome: Progressing Towards Goal  Goal: Medications  Outcome: Progressing Towards Goal  Goal: Respiratory  Outcome: Progressing Towards Goal  Goal: Treatments/Interventions/Procedures  Outcome: Progressing Towards Goal  Goal: Psychosocial  Outcome: Progressing Towards Goal  Goal: *Oxygen saturation within defined limits  Outcome: Progressing Towards Goal  Goal: *Hemodynamically stable  Outcome: Progressing Towards Goal  Goal: *Optimal pain control at patient's stated goal  Outcome: Progressing Towards Goal  Goal: *Anxiety reduced or absent  Outcome: Progressing Towards Goal  Goal: *Demonstrates progressive activity  Outcome: Progressing Towards Goal     Problem: Heart Failure: Day 4  Goal: Off Pathway (Use only if patient is Off Pathway)  Outcome: Progressing Towards Goal  Goal: Activity/Safety  Outcome: Progressing Towards Goal  Goal: Diagnostic Test/Procedures  Outcome: Progressing Towards Goal  Goal: Nutrition/Diet  Outcome: Progressing Towards Goal  Goal: Discharge Planning  Outcome: Progressing Towards Goal  Goal: Medications  Outcome: Progressing Towards Goal  Goal: Respiratory  Outcome: Progressing Towards Goal  Goal: Treatments/Interventions/Procedures  Outcome: Progressing Towards Goal  Goal: Psychosocial  Outcome: Progressing Towards Goal  Goal: *Oxygen saturation within defined limits  Outcome: Progressing Towards Goal  Goal: *Hemodynamically stable  Outcome: Progressing Towards Goal  Goal: *Optimal pain control at patient's stated goal  Outcome: Progressing Towards Goal  Goal: *Anxiety reduced or absent  Outcome: Progressing Towards Goal  Goal: *Demonstrates progressive activity  Outcome: Progressing Towards Goal     Problem: Heart Failure: Day 5  Goal: Off Pathway (Use only if patient is Off Pathway)  Outcome: Progressing Towards Goal  Goal: Activity/Safety  Outcome: Progressing Towards Goal  Goal: Diagnostic Test/Procedures  Outcome: Progressing Towards Goal  Goal: Nutrition/Diet  Outcome: Progressing Towards Goal  Goal: Discharge Planning  Outcome: Progressing Towards Goal  Goal: Medications  Outcome: Progressing Towards Goal  Goal: Respiratory  Outcome: Progressing Towards Goal  Goal: Treatments/Interventions/Procedures  Outcome: Progressing Towards Goal  Goal: Psychosocial  Outcome: Progressing Towards Goal     Problem: Heart Failure: Discharge Outcomes  Goal: *Demonstrates ability to perform prescribed activity without shortness of breath or discomfort  Outcome: Progressing Towards Goal  Goal: *Left ventricular function assessment completed prior to or during stay, or planned for post-discharge  Outcome: Progressing Towards Goal  Goal: *ACEI prescribed if LVEF less than 40% and no contraindications or ARB prescribed  Outcome: Progressing Towards Goal  Goal: *Verbalizes understanding and describes prescribed diet  Outcome: Progressing Towards Goal  Goal: *Verbalizes understanding/describes prescribed medications  Outcome: Progressing Towards Goal  Goal: *Describes available resources and support systems  Description: (eg: Home Health, Palliative Care, [employer]ive)  Outcome: Progressing Towards Goal  Goal: *Describes smoking cessation resources  Outcome: Progressing Towards Goal  Goal: *Understands and describes signs and symptoms to report to providers(Stroke Metric)  Outcome: Progressing Towards Goal  Goal: *Describes/verbalizes understanding of follow-up/return appt  Description: (eg: to physicians, diabetes treatment coordinator, and other resources  Outcome: Progressing Towards Goal  Goal: *Describes importance of continuing daily weights and changes to report to physician  Outcome: Progressing Towards Goal

## 2021-03-11 NOTE — PROGRESS NOTES
[facility]  -  Progress Note      Patient: Miguelito Black MRN: [id number]  SSN: [id number]    YOB: 1934  Age: 80 y.o. Sex: male      Admit Date: 3/10/2021    Assessment:     -Atrial fibrillation: Intermediatehigh risk chads 2 vascular score  -Cardiomyopathy: LVEF 45-50% in 3/10/2021  -Hypertension  -History of CVA  -Hyperlipidemia  -BPH  -GERD    Plan: On telemetry patient appears to be in sinus rhythm. Will order EKG  We will reduce Cardizem to 5 mg/h dose and then try to wean off  Continue metoprolol  Currently on aspirin for stroke prophylaxis  Discussed with patient regarding anticoagulation with Eliquis. He is agreeable  If no further invasive or surgical work-up planned, recommend to start Eliquis 2.5 mg twice daily    Subjective:     Denies any chest pain or chest tightness. Has some nausea and stomach discomfort.     Objective:      Patient Vitals for the past 8 hrs:   Temp Pulse Resp BP SpO2   03/11/21 0745 99.7 °F (37.6 °C) 91 18 (!) 117/57 93 %   03/11/21 0615  (!) 114  (!) 107/52    03/11/21 0502  (!) 108 20 (!) 112/52 95 %   03/11/21 0431 97.8 °F (36.6 °C) (!) 101 20 112/70 94 %   03/11/21 0202 98.1 °F (36.7 °C) 78 20 (!) 101/56 98 %         Patient Vitals for the past 96 hrs:   Weight   03/10/21 1119 180 lb (81.6 kg)   03/10/21 1000 187 lb 3.2 oz (84.9 kg)   03/10/21 0604 180 lb (81.6 kg)         Intake/Output Summary (Last 24 hours) at 3/11/2021 0935  Last data filed at 3/11/2021 0617  Gross per 24 hour   Intake 624.67 ml   Output 2550 ml   Net -1925.33 ml       Physical Exam:  General:  alert, cooperative, no distress, appears stated age  Neck:  no JVD  Lungs:  clear to auscultation bilaterally  Heart:  regular rate and rhythm, S1, S2 normal, no murmur, click, rub or gallop  Abdomen:  abdomen is soft without significant tenderness,   Extremities: No edema  Data Review:     Labs: Results:       Chemistry Recent Labs     03/11/21  0015 03/10/21  0433 * 155*    137   K 4.6 4.3    107   CO2 26 23   BUN 24* 22*   CREA 2.07* 1.79*   CA 8.1* 8.7   MG 2.2 1.9   PHOS 4.1  --    AGAP 8 7   BUCR 12 12   AP  --  107   TP  --  6.1*   ALB  --  3.3*   GLOB  --  2.8   AGRAT  --  1.2      CBC w/Diff Recent Labs     03/11/21  0015 03/10/21  0434   WBC 6.7 8.9   RBC 3.41* 3.92*   HGB 10.4* 12.2*   HCT 33.0* 38.2    223   GRANS 83* 76*   LYMPH 6* 15*   EOS 0 1      Cardiac Enzymes Lab Results   Component Value Date/Time    TROIQ 0.02 03/11/2021 12:15 AM      Coagulation Recent Labs     03/10/21  0434   PTP 14.4   INR 1.1   APTT 32.6       Lipid Panel Lab Results   Component Value Date/Time    Cholesterol, total 133 10/22/2018 01:55 PM    Cholesterol, total 122 10/22/2018 01:55 PM    HDL Cholesterol 44 10/22/2018 01:55 PM    HDL Cholesterol 41 10/22/2018 01:55 PM    LDL, calculated 52 10/22/2018 01:55 PM    LDL, calculated 47.4 10/22/2018 01:55 PM    VLDL, calculated 37 10/22/2018 01:55 PM    VLDL, calculated 33.6 10/22/2018 01:55 PM    Triglyceride 185 (H) 10/22/2018 01:55 PM    Triglyceride 168 (H) 10/22/2018 01:55 PM    CHOL/HDL Ratio 3.0 10/22/2018 01:55 PM    CHOL/HDL Ratio 3.0 10/22/2018 01:55 PM      BNP No results found for: BNP, BNPP, XBNPT   Liver Enzymes Recent Labs     03/10/21  0434   TP 6.1*   ALB 3.3*         Digoxin    Thyroid Studies Lab Results   Component Value Date/Time    TSH 1.30 03/10/2021 04:34 AM

## 2021-03-12 ENCOUNTER — HOME HEALTH ADMISSION (OUTPATIENT)
Dept: HOME HEALTH SERVICES | Facility: HOME HEALTH | Age: 86
End: 2021-03-12
Payer: MEDICARE

## 2021-03-12 VITALS
HEIGHT: 67 IN | BODY MASS INDEX: 28.22 KG/M2 | WEIGHT: 179.8 LBS | DIASTOLIC BLOOD PRESSURE: 70 MMHG | RESPIRATION RATE: 18 BRPM | TEMPERATURE: 98.1 F | SYSTOLIC BLOOD PRESSURE: 137 MMHG | HEART RATE: 76 BPM | OXYGEN SATURATION: 92 %

## 2021-03-12 LAB
ANION GAP SERPL CALC-SCNC: 6 MMOL/L (ref 3–18)
ATRIAL RATE: 125 BPM
ATRIAL RATE: 86 BPM
BUN SERPL-MCNC: 24 MG/DL (ref 7–18)
BUN/CREAT SERPL: 14 (ref 12–20)
CALCIUM SERPL-MCNC: 8.5 MG/DL (ref 8.5–10.1)
CALCULATED P AXIS, ECG09: 67 DEGREES
CALCULATED R AXIS, ECG10: 103 DEGREES
CALCULATED R AXIS, ECG10: 33 DEGREES
CALCULATED T AXIS, ECG11: -23 DEGREES
CALCULATED T AXIS, ECG11: 25 DEGREES
CHLORIDE SERPL-SCNC: 104 MMOL/L (ref 100–111)
CO2 SERPL-SCNC: 28 MMOL/L (ref 21–32)
CREAT SERPL-MCNC: 1.77 MG/DL (ref 0.6–1.3)
DIAGNOSIS, 93000: NORMAL
DIAGNOSIS, 93000: NORMAL
GLUCOSE SERPL-MCNC: 121 MG/DL (ref 74–99)
P-R INTERVAL, ECG05: 174 MS
POTASSIUM SERPL-SCNC: 4.3 MMOL/L (ref 3.5–5.5)
Q-T INTERVAL, ECG07: 346 MS
Q-T INTERVAL, ECG07: 372 MS
QRS DURATION, ECG06: 104 MS
QRS DURATION, ECG06: 146 MS
QTC CALCULATION (BEZET), ECG08: 414 MS
QTC CALCULATION (BEZET), ECG08: 500 MS
SODIUM SERPL-SCNC: 138 MMOL/L (ref 136–145)
VENTRICULAR RATE, ECG03: 109 BPM
VENTRICULAR RATE, ECG03: 86 BPM

## 2021-03-12 PROCEDURE — 74011250637 HC RX REV CODE- 250/637: Performed by: INTERNAL MEDICINE

## 2021-03-12 PROCEDURE — 80048 BASIC METABOLIC PNL TOTAL CA: CPT

## 2021-03-12 PROCEDURE — 36415 COLL VENOUS BLD VENIPUNCTURE: CPT

## 2021-03-12 PROCEDURE — 74011250637 HC RX REV CODE- 250/637: Performed by: HOSPITALIST

## 2021-03-12 PROCEDURE — 74011250636 HC RX REV CODE- 250/636: Performed by: HOSPITALIST

## 2021-03-12 PROCEDURE — 99232 SBSQ HOSP IP/OBS MODERATE 35: CPT | Performed by: INTERNAL MEDICINE

## 2021-03-12 RX ORDER — FUROSEMIDE 20 MG/1
20 TABLET ORAL EVERY OTHER DAY
Status: DISCONTINUED | OUTPATIENT
Start: 2021-03-13 | End: 2021-03-12 | Stop reason: HOSPADM

## 2021-03-12 RX ADMIN — ENOXAPARIN SODIUM 30 MG: 30 INJECTION SUBCUTANEOUS at 09:05

## 2021-03-12 RX ADMIN — PANTOPRAZOLE SODIUM 20 MG: 20 TABLET, DELAYED RELEASE ORAL at 08:00

## 2021-03-12 RX ADMIN — METOPROLOL TARTRATE 100 MG: 50 TABLET, FILM COATED ORAL at 09:04

## 2021-03-12 RX ADMIN — CHOLECALCIFEROL (VITAMIN D3) 25 MCG (1,000 UNIT) TABLET 2000 UNITS: at 09:05

## 2021-03-12 RX ADMIN — ASPIRIN 81 MG: 81 TABLET, CHEWABLE ORAL at 09:05

## 2021-03-12 RX ADMIN — Medication 10 ML: at 08:01

## 2021-03-12 RX ADMIN — APIXABAN 2.5 MG: 2.5 TABLET, FILM COATED ORAL at 12:47

## 2021-03-12 NOTE — DISCHARGE INSTRUCTIONS
Patient Education        Atrial Fibrillation: Care Instructions  Your Care Instructions     Atrial fibrillation is an irregular and often fast heartbeat. Treating this condition is important for several reasons. It can cause blood clots, which can travel from your heart to your brain and cause a stroke. If you have a fast heartbeat, you may feel lightheaded, dizzy, and weak. An irregular heartbeat can also increase your risk for heart failure. Atrial fibrillation is often the result of another heart condition, such as high blood pressure or coronary artery disease. Making changes to improve your heart condition will help you stay healthy and active. Follow-up care is a key part of your treatment and safety. Be sure to make and go to all appointments, and call your doctor if you are having problems. It's also a good idea to know your test results and keep a list of the medicines you take. How can you care for yourself at home? Medicines    · Take your medicines exactly as prescribed. Call your doctor if you think you are having a problem with your medicine. You will get more details on the specific medicines your doctor prescribes.     · If your doctor has given you a blood thinner to prevent a stroke, be sure you get instructions about how to take your medicine safely. Blood thinners can cause serious bleeding problems.     · Do not take any vitamins, over-the-counter drugs, or herbal products without talking to your doctor first.   Lifestyle changes    · Do not smoke. Smoking can increase your chance of a stroke and heart attack. If you need help quitting, talk to your doctor about stop-smoking programs and medicines. These can increase your chances of quitting for good.     · Eat a heart-healthy diet.     · Stay at a healthy weight. Lose weight if you need to.     · Limit alcohol to 2 drinks a day for men and 1 drink a day for women. Too much alcohol can cause health problems.     · Avoid colds and flu.  Get a pneumococcal vaccine shot. If you have had one before, ask your doctor whether you need another dose. Get a flu shot every year. If you must be around people with colds or flu, wash your hands often. Activity    · If your doctor recommends it, get more exercise. Walking is a good choice. Bit by bit, increase the amount you walk every day. Try for at least 30 minutes on most days of the week. You also may want to swim, bike, or do other activities. Your doctor may suggest that you join a cardiac rehabilitation program so that you can have help increasing your physical activity safely.     · Start light exercise if your doctor says it is okay. Even a small amount will help you get stronger, have more energy, and manage stress. Walking is an easy way to get exercise. Start out by walking a little more than you did in the hospital. Gradually increase the amount you walk.     · When you exercise, watch for signs that your heart is working too hard. You are pushing too hard if you cannot talk while you are exercising. If you become short of breath or dizzy or have chest pain, sit down and rest immediately.     · Check your pulse regularly. Place two fingers on the artery at the palm side of your wrist, in line with your thumb. If your heartbeat seems uneven or fast, talk to your doctor. When should you call for help? Call 911 anytime you think you may need emergency care. For example, call if:    · You have symptoms of a heart attack. These may include:  ? Chest pain or pressure, or a strange feeling in the chest.  ? Sweating. ? Shortness of breath. ? Nausea or vomiting. ? Pain, pressure, or a strange feeling in the back, neck, jaw, or upper belly or in one or both shoulders or arms. ? Lightheadedness or sudden weakness. ? A fast or irregular heartbeat. After you call 911, the  may tell you to chew 1 adult-strength or 2 to 4 low-dose aspirin. Wait for an ambulance.  Do not try to drive yourself.     · You have symptoms of a stroke. These may include:  ? Sudden numbness, tingling, weakness, or loss of movement in your face, arm, or leg, especially on only one side of your body. ? Sudden vision changes. ? Sudden trouble speaking. ? Sudden confusion or trouble understanding simple statements. ? Sudden problems with walking or balance. ? A sudden, severe headache that is different from past headaches.     · You passed out (lost consciousness). Call your doctor now or seek immediate medical care if:    · You have new or increased shortness of breath.     · You feel dizzy or lightheaded, or you feel like you may faint.     · Your heart rate becomes irregular.     · You can feel your heart flutter in your chest or skip heartbeats. Tell your doctor if these symptoms are new or worse. Watch closely for changes in your health, and be sure to contact your doctor if you have any problems. Where can you learn more? Go to http://www.gray.com/  Enter U020 in the search box to learn more about \"Atrial Fibrillation: Care Instructions. \"  Current as of: December 16, 2019               Content Version: 12.6  © 2818-2693 [employer]. Care instructions adapted under license by [employer] (which disclaims liability or warranty for this information). If you have questions about a medical condition or this instruction, always ask your healthcare professional. [address] any warranty or liability for your use of this information. Patient Education        Heart Failure: Care Instructions  Your Care Instructions     Heart failure occurs when your heart does not pump as much blood as the body needs. Failure does not mean that the heart has stopped pumping but rather that it is not pumping as well as it should. Over time, this causes fluid buildup in your lungs and other parts of your body.  Fluid buildup can cause shortness of breath, fatigue, swollen ankles, and other problems. By taking medicines regularly, reducing sodium (salt) in your diet, checking your weight every day, and making lifestyle changes, you can feel better and live longer. Follow-up care is a key part of your treatment and safety. Be sure to make and go to all appointments, and call your doctor if you are having problems. It's also a good idea to know your test results and keep a list of the medicines you take. How can you care for yourself at home? Medicines    · Be safe with medicines. Take your medicines exactly as prescribed. Call your doctor if you think you are having a problem with your medicine.     · Do not take any vitamins, over-the-counter medicine, or herbal products without talking to your doctor first. Geneva Mcginniser not take ibuprofen (Advil or Motrin) and naproxen (Aleve) without talking to your doctor first. They could make your heart failure worse.     · You may take some of the following medicine. ? Angiotensin-converting enzyme inhibitors (ACEIs) or angiotensin II receptor blockers (ARBs) reduce the heart's workload, lower blood pressure, and reduce swelling. Taking an ACEI or ARB may lower your chance of needing to be hospitalized. ? Beta-blockers can slow heart rate, decrease blood pressure, and improve your condition. Taking a beta-blocker may lower your chance of needing to be hospitalized. ? Diuretics, also called water pills, reduce swelling. You will get more details on the specific medicines your doctor prescribes. Diet    · Your doctor may suggest that you limit sodium. Your doctor can tell you how much sodium is right for you. An example is less than 3,000 mg a day. This includes all the salt you eat in cooking or in packaged foods. People get most of their sodium from processed foods. Fast food and restaurant meals also tend to be very high in sodium.     · Ask your doctor how much liquid you can drink each day. You may have to limit liquids.    Weight    · Weigh yourself without clothing at the same time each day. Record your weight. Call your doctor if you have a sudden weight gain, such as more than 2 to 3 pounds in a day or 5 pounds in a week. (Your doctor may suggest a different range of weight gain.) A sudden weight gain may mean that your heart failure is getting worse. Activity level    · Start light exercise (if your doctor says it is okay). Even if you can only do a small amount, exercise will help you get stronger, have more energy, and manage your weight and your stress. Walking is an easy way to get exercise. Start out by walking a little more than you did before. Bit by bit, increase the amount you walk.     · When you exercise, watch for signs that your heart is working too hard. You are pushing yourself too hard if you cannot talk while you are exercising. If you become short of breath or dizzy or have chest pain, stop, sit down, and rest.     · If you feel \"wiped out\" the day after you exercise, walk slower or for a shorter distance until you can work up to a better pace.     · Get enough rest at night. Sleeping with 1 or 2 pillows under your upper body and head may help you breathe easier. Lifestyle changes    · Do not smoke. Smoking can make a heart condition worse. If you need help quitting, talk to your doctor about stop-smoking programs and medicines. These can increase your chances of quitting for good. Quitting smoking may be the most important step you can take to protect your heart.     · Limit alcohol to 2 drinks a day for men and 1 drink a day for women. Too much alcohol can cause health problems.     · Avoid getting sick from colds and the flu. Get a pneumococcal vaccine shot. If you have had one before, ask your doctor whether you need another dose. Get a flu shot each year. If you must be around people with colds or the flu, wash your hands often. When should you call for help?    Call 911 if you have symptoms of sudden heart failure such as:    · You have severe trouble breathing.     · You cough up pink, foamy mucus.     · You have a new irregular or rapid heartbeat. Call your doctor now or seek immediate medical care if:    · You have new or increased shortness of breath.     · You are dizzy or lightheaded, or you feel like you may faint.     · You have sudden weight gain, such as more than 2 to 3 pounds in a day or 5 pounds in a week. (Your doctor may suggest a different range of weight gain.)     · You have increased swelling in your legs, ankles, or feet.     · You are suddenly so tired or weak that you cannot do your usual activities. Watch closely for changes in your health, and be sure to contact your doctor if you develop new symptoms. Where can you learn more? Go to http://www.gray.com/  Enter K294 in the search box to learn more about \"Heart Failure: Care Instructions. \"  Current as of: December 16, 2019               Content Version: 12.6  © 1094-8674 [employer]. Care instructions adapted under license by [employer] (which disclaims liability or warranty for this information). If you have questions about a medical condition or this instruction, always ask your healthcare professional. [address] any warranty or liability for your use of this information. Patient Education        Avoiding Triggers With Heart Failure: Care Instructions  Your Care Instructions     Triggers are anything that make your heart failure flare up. A flare-up is also called \"sudden heart failure\" or \"acute heart failure. \" When you have a flare-up, fluid builds up in your lungs, and you have problems breathing. You might need to go to the hospital. By watching for changes in your condition and avoiding triggers, you can prevent heart failure flare-ups. Follow-up care is a key part of your treatment and safety.  Be sure to make and go to all appointments, and call your doctor if you are having problems. It's also a good idea to know your test results and keep a list of the medicines you take. How can you care for yourself at home? Watch for changes in your weight and condition  · Weigh yourself without clothing at the same time each day. Record your weight. Call your doctor if you have sudden weight gain, such as more than 2 to 3 pounds in a day or 5 pounds in a week. (Your doctor may suggest a different range of weight gain.) A sudden weight gain may mean that your heart failure is getting worse. · Keep a daily record of your symptoms. Write down any changes in how you feel, such as new shortness of breath, cough, or problems eating. Also record if your ankles are more swollen than usual and if you feel more tired than usual. Note anything that you ate or did that could have triggered these changes. Limit sodium  Sodium causes your body to hold on to extra water. This may cause your heart failure symptoms to get worse. People get most of their sodium from processed foods. Fast food and restaurant meals also tend to be very high in sodium. · Your doctor may suggest that you limit sodium. Your doctor can tell you how much sodium is right for you. This includes limiting sodium in cooked and packaged foods. · Read food labels on cans and food packages. They tell you how much sodium you get in one serving. Check the serving size. If you eat more than one serving, you are getting more sodium. · Be aware that sodium can come in forms other than salt, including monosodium glutamate (MSG), sodium citrate, and sodium bicarbonate (baking soda). MSG is often added to Asian food. You can sometimes ask for food without MSG or salt. · Slowly reducing salt will help you adjust to the taste. Take the salt shaker off the table. · Flavor your food with garlic, lemon juice, onion, vinegar, herbs, and spices instead of salt.  Do not use soy sauce, steak sauce, onion salt, garlic salt, mustard, or ketchup on your food, unless it is labeled \"low-sodium\" or \"low-salt. \"  · Make your own salad dressings, sauces, and ketchup without adding salt. · Use fresh or frozen ingredients, instead of canned ones, whenever you can. Choose low-sodium canned goods. · Eat less processed food and food from restaurants, including fast food. Exercise as directed  Moderate, regular exercise is very good for your heart. It improves your blood flow and helps control your weight. But too much exercise can stress your heart and cause a heart failure flare-up. · Check with your doctor before you start an exercise program.  · Walking is an easy way to get exercise. Start out slowly. Gradually increase the length and pace of your walk. Swimming, riding a bike, and using a treadmill are also good forms of exercise. · When you exercise, watch for signs that your heart is working too hard. You are pushing yourself too hard if you cannot talk while you are exercising. If you become short of breath or dizzy or have chest pain, stop, sit down, and rest.  · Do not exercise when you do not feel well. Take medicines correctly  · Take your medicines exactly as prescribed. Call your doctor if you think you are having a problem with your medicine. · Make a list of all the medicines you take. Include those prescribed to you by other doctors and any over-the-counter medicines, vitamins, or supplements you take. Take this list with you when you go to any doctor. · Take your medicines at the same time every day. It may help you to post a list of all the medicines you take every day and what time of day you take them. · Make taking your medicine as simple as you can. Plan times to take your medicines when you are doing other things, such as eating a meal or getting ready for bed. This will make it easier to remember to take your medicines. · Get organized. Use helpful tools, such as daily or weekly pill containers.   When should you call for help? Call 911 if you have symptoms of sudden heart failure such as:    · You have severe trouble breathing.     · You cough up pink, foamy mucus.     · You have a new irregular or rapid heartbeat. Call your doctor now or seek immediate medical care if:    · You have new or increased shortness of breath.     · You are dizzy or lightheaded, or you feel like you may faint.     · You have sudden weight gain, such as more than 2 to 3 pounds in a day or 5 pounds in a week. (Your doctor may suggest a different range of weight gain.)     · You have increased swelling in your legs, ankles, or feet.     · You are suddenly so tired or weak that you cannot do your usual activities. Watch closely for changes in your health, and be sure to contact your doctor if you develop new symptoms. Where can you learn more? Go to http://www.gray.com/  Enter V089 in the search box to learn more about \"Avoiding Triggers With Heart Failure: Care Instructions. \"  Current as of: December 16, 2019               Content Version: 12.6  © 1634-5706 [employer]. Care instructions adapted under license by [employer] (which disclaims liability or warranty for this information). If you have questions about a medical condition or this instruction, always ask your healthcare professional. [address] any warranty or liability for your use of this information. Patient Education        Limiting Sodium With Heart Failure: Care Instructions  Your Care Instructions     Sodium causes your body to hold on to extra water. This may cause your heart failure symptoms to get worse. Limiting sodium may help you feel better. People get most of their sodium from processed foods. Fast food and restaurant meals also tend to be very high in sodium. Your doctor may suggest that you limit sodium. Your doctor can tell you how much sodium is right for you.  An example is less than 3,000 mg a day. This includes all the salt you eat in cooked or packaged foods. Follow-up care is a key part of your treatment and safety. Be sure to make and go to all appointments, and call your doctor if you are having problems. It's also a good idea to know your test results and keep a list of the medicines you take. How can you care for yourself at home? Read food labels  · Read food labels on cans and food packages. The labels tell you how much sodium is in each serving. Make sure that you look at the serving size. If you eat more than the serving size, you have eaten more sodium than is listed for one serving. · Food labels also tell you the Percent Daily Value for sodium. Choose products with low Percent Daily Values for sodium. · Be aware that sodium can come in forms other than salt, including monosodium glutamate (MSG), sodium citrate, and sodium bicarbonate (baking soda). MSG is often added to Asian food. You can sometimes ask for food without MSG or salt. Buy low-sodium foods  · Buy foods that are labeled \"unsalted\" (no salt added), \"sodium-free\" (less than 5 mg of sodium per serving), or \"low-sodium\" (less than 140 mg of sodium per serving). A food labeled \"light sodium\" has less than half of the full-sodium version of that food. Foods labeled \"reduced-sodium\" may still have too much sodium. · Buy fresh vegetables or plain, frozen vegetables. Buy low-sodium versions of canned vegetables, soups, and other canned goods. Prepare low-sodium meals  · Use less salt each day when cooking. Reducing salt in this way will help you adjust to the taste. Do not add salt after cooking. Take the salt shaker off the table. · Flavor your food with garlic, lemon juice, onion, vinegar, herbs, and spices instead of salt. Do not use soy sauce, steak sauce, onion salt, garlic salt, mustard, or ketchup on your food. · Make your own salad dressings, sauces, and ketchup without adding salt.   · Use less salt (or none) when recipes call for it. You can often use half the salt a recipe calls for without losing flavor. Other dishes like rice, pasta, and grains do not need added salt. · Rinse canned vegetables. This removes some--but not all--of the salt. · Avoid water that has a naturally high sodium content or that has been treated with water softeners, which add sodium. Call your local water company to find out the sodium content of your water supply. If you buy bottled water, read the label and choose a sodium-free brand. Avoid high-sodium foods, such as:  · Smoked, cured, salted, and canned meat, fish, and poultry. · Ham, navarro, hot dogs, and luncheon meats. · Regular, hard, and processed cheese and regular peanut butter. · Crackers with salted tops. · Frozen prepared meals. · Canned and dried soups, broths, and bouillon, unless labeled sodium-free or low-sodium. · Canned vegetables, unless labeled sodium-free or low-sodium. · Salted snack foods such as chips and pretzels. · [place] fries, pizza, tacos, and other fast foods. · Pickles, olives, ketchup, and other condiments, especially soy sauce, unless labeled sodium-free or low-sodium. If you cannot cook for yourself  · Have family members or friends help you, or have someone cook low-sodium meals. · Check with your local senior nutrition program to find out where meals are served and whether they offer a low-sodium option. You can often find these programs through your local health department or hospital.  · Have meals delivered to your home. Most [facility] have a youcalc on [employer]. These programs provide one hot meal a day for older adults, delivered to their homes. Ask whether these meals are low-sodium. Let them know that you are on a low-sodium diet. Where can you learn more? Go to http://www.gray.com/  Enter A166 in the search box to learn more about \"Limiting Sodium With Heart Failure: Care Instructions. \"  Current as of: December 16, 2019               Content Version: 12.6  © 8868-3513 [employer]. Care instructions adapted under license by [employer] (which disclaims liability or warranty for this information). If you have questions about a medical condition or this instruction, always ask your healthcare professional. [address] any warranty or liability for your use of this information. Patient Education        Medicines for Heart Failure: Care Instructions  Your Care Instructions     Most people with heart failure are helped by taking several medicines to protect their heart. These medicines can help you feel better and live longer. It is important to take all your medicines exactly as prescribed to get the best results. They can also cause side effects. If you think that any of your medicines are causing side effects, talk with your doctor. Follow-up care is a key part of your treatment and safety. Be sure to make and go to all appointments. Call your doctor if you are having problems. It's also a good idea to know your test results and keep a list of the medicines you take. What medicines are used for heart failure? · Aldosterone receptor antagonists. These are a type of diuretic. They make the kidneys get rid of extra fluid. · Angiotensin-converting enzyme (ACE) inhibitors help blood flow. They relax the blood vessels and lower your blood pressure. The heart can then pump more blood through your body without working harder. These include benazepril and lisinopril. · Angiotensin II receptor blockers (ARBs) work like ACE inhibitors. Some people use them instead. They include losartan. · Angiotensin receptor neprilysin inhibitor (ARNI) medicine works like ARBs and ACE inhibitors. Some people take an ARNI medicine instead. An example is sacubitril/valsartan. · Beta-blockers can slow the heart rate and decrease blood pressure. They can help heart failure.  They include bisoprolol, carvedilol, and metoprolol. · Digoxin relieves symptoms in some people with heart failure. · Diuretics reduce swelling. They do this by helping the kidneys get rid of excess fluid. They are also called water pills. · Hydralazine. This may be taken with a nitrate to widen blood vessels. It can lower blood pressure and reduce the workload on the heart. · Ivabradine slows the heart rate. What should you know about these medicines? This is not a complete list of medicines used for heart failure. If you have questions about your medicine, ask your doctor or pharmacist.  ACE inhibitors  Before you start to take an ACE inhibitor, talk to your doctor. Tell him or her if:  · You take any anti-inflammatory medicines. These include ibuprofen (Advil, Motrin) and naproxen (Aleve). · You take antacids, potassium pills or a salt substitute, or lithium. · You take a diuretic. · You are pregnant or breastfeeding or you plan to get pregnant. · You have kidney disease. Side effects include:  · A dry cough. If the cough is bad enough to make you stop the medicine, talk to your doctor. You may need to take a different one. · Feeling lightheaded. This may happen when you stand up too fast. It usually gets better with time. Angiotensin II receptor blockers (ARBs)  Before you start to take an ARB, talk to your doctor. Tell him or her if:  · You take any anti-inflammatory medicines. These include ibuprofen (Advil, Motrin) and naproxen (Aleve). · You take antacids, potassium pills or a salt substitute, or lithium. · You have kidney disease. · You take a diuretic. · You are pregnant or breastfeeding or you plan to get pregnant. Side effects include:  · Feeling lightheaded or dizzy. These are the most common side effects. Angiotensin receptor neprilysin inhibitor (ARNI)  Before you start to take an ARNI, talk to your doctor. Tell him or her if:  · You take any anti-inflammatory medicines.  These include ibuprofen (Advil, Motrin) and naproxen (Aleve). · You take antacids, potassium pills or a salt substitute, or lithium. · You take an ACE inhibitor or an ARB. · You have kidney or liver problems. · You take a diuretic. · You are pregnant or breastfeeding or you plan to get pregnant. Side effects include:  · Feeling lightheaded or dizzy. These are the most common side effects. Diuretics (water pills)  Before you start to take a diuretic, talk to your doctor. Tell him or her if:  · You take lithium or anti-inflammatory medicines such as Advil or Aleve. Side effects include:  · Urinating often. Ask your doctor about timing these pills so that you don't have to use the bathroom at a bad time. · Muscle cramps. This may mean that you are losing too much potassium. It is an important mineral. Call your doctor if you get muscle cramps. · Tender breasts. This may occur in men who take spironolactone. Call your doctor if you get tender breasts that bother you. Beta-blockers  Before you start to take a beta-blocker, talk to your doctor. Tell him or her if:  · You have asthma or diabetes. Side effects include:  · Feeling dizzy or tired. This usually gets better with time. Digoxin  Before you start to take digoxin, talk to your doctor. Tell him or her if:  · You have kidney disease. · You take a diuretic or other medicines. This includes over-the-counter medicines. Side effects include:  · Nausea or vomiting. · Confusion, changes in vision, a racing or slowed heartbeat, or dizziness. Call your doctor right away if you have any of these side effects. Where can you learn more? Go to http://www.gray.com/  Enter F132 in the search box to learn more about \"Medicines for Heart Failure: Care Instructions. \"  Current as of: December 16, 2019               Content Version: 12.6  © 9119-6650 [employer].    Care instructions adapted under license by [employer] (which disclaims liability or warranty for this information). If you have questions about a medical condition or this instruction, always ask your healthcare professional. [address] any warranty or liability for your use of this information. Patient Education        Learning About Self-Care for Heart Failure  What is self-care for heart failure? Heart failure usually gets worse over time. But there are many things you can do to feel better, avoid the hospital, and live longer. Self-care means managing your health by doing certain things every day, like weighing yourself. It's about knowing which symptoms to watch for so you can avoid getting worse. When you practice good self-care, you know when it's time to call your doctor and when your heart failure has turned into an emergency. The list below can help. Top 5 self-care tips for every day   1. Take your medicines as prescribed. This gives them the best chance of helping you. 2. Weigh yourself every day. This helps you watch for signs that you're getting worse. Weight gain may be a sign that your body is holding on to too much fluid. Weigh yourself at the same time each day, using the same scale, on a hard, flat surface. The best time is in the morning after you go to the bathroom and before you eat or drink anything. 3. Keep a daily record of your symptoms. Checking your symptoms helps you see what symptoms are normal for you and if they change or get worse. 4. Limit sodium. This helps keep fluid from building up and may help you feel better. Your doctor can tell you how much sodium is right for you. An example is less than 3,000 milligrams (mg) a day. Try limiting the salt you eat at home, and by watching for \"hidden\" sodium when you eat out or shop for food. 5. Try to exercise throughout the week. Exercise makes your heart stronger and can help you avoid symptoms. Walking is a great way to get exercise.  If your doctor says it's safe, start out with some short walks. Then slowly build up to longer ones. Some people with heart failure also may need to limit how much fluid they drink each day. Ask your doctor if this is true for you and, if it is, how much fluid is safe for you to drink each day. When should you act? Try to become familiar with signs that mean your heart failure is getting worse. If you need help, talk with your doctor about making a personal plan. Here are some things to watch for as you practice your daily self-care. Call your doctor if:  · You have sudden weight gain, such as more than 2 to 3 pounds in a day or 5 pounds in a week. (Your doctor may suggest a different range of weight gain.)  · You have new or worse swelling in your feet, ankles, or legs. · Your breathing gets worse. Activities that did not make you short of breath before are hard for you now. · Your breathing when you lie down is worse than usual, or you wake up at night needing to catch your breath. Be sure to make and go to all of your follow-up appointments. And it's always a good idea to call your doctor anytime you have a sudden change in symptoms. When is it an emergency? Sometimes the symptoms get worse very quickly. This is called sudden heart failure. It causes fluid to build up in your lungs. Sudden heart failure is an emergency. If you have any of these symptoms, you need care right away. Call 911 if:   · You have severe shortness of breath. · You have an irregular or fast heartbeat. · You cough up foamy, pink mucus. What else can you do to stay healthy? There are other things you can do to take care of your body and your heart. These things will help you feel better. And they will also reduce your risk of heart attack and stroke. · Try to stay at a healthy weight. Eat a healthy diet with lots of fresh fruit, vegetables, and whole grains. · If you smoke, quit. · Limit the amount of alcohol you drink.   · Keep high blood pressure and diabetes under control. If you need help, talk with your doctor. If your doctor has not set you up with a cardiac rehabilitation (rehab) program, talk to him or her about whether that is right for you. Cardiac rehab includes exercise, help with diet and lifestyle changes, and emotional support. Also let your doctor know if:  · You're having trouble sleeping. Sleep is important to your well-being. It also helps your heart work the way it's supposed to. Your doctor can help you decide if you need treatment for sleep problems. · You're feeling sad or hopeless much of the time, or you are worried and anxious. Heart failure can be hard on your emotions. Treatment with counseling and medicine can help. And when you feel better, you're more likely to take care of yourself. · You think you may have a problem with alcohol or drug use. You and your doctor can decide if you have a problem and what type of treatment might help you. Follow-up care is a key part of your treatment and safety. Be sure to make and go to all appointments, and call your doctor if you are having problems. It's also a good idea to know your test results and keep a list of the medicines you take. Where can you learn more? Go to http://www.gray.com/  Enter H262 in the search box to learn more about \"Learning About Self-Care for Heart Failure. \"  Current as of: December 16, 2019               Content Version: 12.6  © 5356-9063 [employer]. Care instructions adapted under license by [employer] (which disclaims liability or warranty for this information). If you have questions about a medical condition or this instruction, always ask your healthcare professional. [place] [address] any warranty or liability for your use of this information.          Patient Education        Medicines to Avoid With Heart Failure: Care Instructions  Your Care Instructions     Your doctor gave you medicines to help treat your heart failure. But did you know that many other medicines can make heart failure worse? Even medicines and herbs that you buy over the counter (OTC) can harm you. Be sure your doctor knows all of the OTC and prescription drugs you take. And don't start to take any medicine unless your doctor says it's okay. Follow-up care is a key part of your treatment and safety. Be sure to make and go to all appointments, and call your doctor if you are having problems. It's also a good idea to know your test results and keep a list of the medicines you take. How can you care for yourself at home? Over-the-counter drugs  · Before you take any over-the-counter drug, ask your doctor or pharmacist if it's safe. This includes herbs and vitamins. Medicines to avoid include:  ? Pain relievers called NSAIDs. These include ibuprofen and naproxen. Use acetaminophen instead. For example, you can take Tylenol for pain or fever. ? Low-dose aspirin. If your doctor has told you to take aspirin every day for your heart, follow the doctor's instructions on how much to take. Don't take aspirin for pain. ? Antacids or laxatives. Don't take ones that have sodium in them. These include [place]. ? Cold, cough, flu, or sinus medicines. Read the label. Don't take ones that have pseudoephedrine, ephedrine, phenylephrine, or oxymetazoline in them. And make sure they don't have aspirin or ibuprofen in them. Watch for all of these in allergy medicines, nose sprays, and herbal products too.  ? Supplements and vitamins. These include black cohosh, Ana's wort, and vitamin E.  Prescription drugs  · Each time you see a doctor, make sure that your doctor knows that you take drugs for heart failure. Before you fill any new prescription, ask the pharmacist if it's okay to take the new drug. Drugs that can make heart failure worse include:  ? Calcium channel blockers. These include nifedipine.  If you need to take this type of drug for another health problem, your doctor will closely watch your health. ? Heart rhythm drugs. These include disopyramide and flecainide. These can treat a fast or uneven heart rhythm. ? Prescription NSAIDs. These include celecoxib (Celebrex) and diclofenac.  ? Certain medicines for diabetes. These include pioglitazone, rosiglitazone, and saxagliptin. Where can you learn more? Go to http://www.gray.com/  Enter O305 in the search box to learn more about \"Medicines to Avoid With Heart Failure: Care Instructions. \"  Current as of: December 16, 2019               Content Version: 12.6  © 2867-6141 [employer]. Care instructions adapted under license by [employer] (which disclaims liability or warranty for this information). If you have questions about a medical condition or this instruction, always ask your healthcare professional. [address] any warranty or liability for your use of this information.

## 2021-03-12 NOTE — PROGRESS NOTES
Chart reviewed. Plan remains home with [facility] when medically stable. Orders are in & referral entered. Will need to call [facility] @ discharge. Will cont to follow for further needs. Kiera Narvaez RN,ext [phone].

## 2021-03-12 NOTE — ROUTINE PROCESS
4024 Received patient report  from Nereyda Naqvi outgoing nurse. 0818 reported to patient room who was awake  Alert and oriented x4, vital signs taken all within normal limit. Assessment done; lungs clear bilaterally, lower extremities normal no edema or wound. 2 IV lines G18 on RFA Diltiazem running at 5ml/hr  and G24 on Left Forearm flushed well with NS. Morning Medications administered. 1030 ADLS done  Patient sitting on recliner. 1120 patient used bathroom. 400 Adrianna Silva gave patient discharge instructions who acknowledged the understanding.    1419 patient discharged and left for home   Sister Edmnud Spencer RN

## 2021-03-12 NOTE — ROUTINE PROCESS
1930 Bedside and Verbal shift change report given to Shravan [address] (oncoming nurse) by Eder Jordan RN (offgoing nurse). Report included the following information SBAR, Kardex, Intake/Output, MAR, Recent Results and Cardiac Rhythm NSR with NSR with beats of Vtach.

## 2021-03-12 NOTE — PROGRESS NOTES
[facility]  -  Progress Note      Patient: Sallie Hale MRN: [id number]  SSN: [id number]    YOB: 1934  Age: 80 y.o. Sex: male      Admit Date: 3/10/2021    Assessment:     -Atrial fibrillation: Intermediatehigh risk chads 2 vascular score  -Cardiomyopathy: LVEF 45-50% in 3/10/2021  -Hypertension  -History of CVA  -Hyperlipidemia  -BPH  -GERD    Plan: On telemetry patient appears to be in sinus rhythm. EKG yesterday confirmed it  DC IV cardizem drip  Creat improved after stopping IV lasix. Will use only lasix 20 mg maintanance dose for LV dysfunction. Continue metoprolol  Currently on aspirin for stroke prophylaxis  If no further invasive or surgical work-up planned, recommend to start Eliquis 2.5 mg twice daily and stop ASA. No further work up is planned. Will be available as needed. Pls call with question    Subjective:     Denies any chest pain or chest tightness. Has some nausea and stomach discomfort.     Objective:      Patient Vitals for the past 8 hrs:   Temp Pulse Resp BP SpO2   03/12/21 0812 98 °F (36.7 °C) (!) 101 20 (!) 150/77 92 %         Patient Vitals for the past 96 hrs:   Weight   03/11/21 1800 180 lb 6.4 oz (81.8 kg)   03/10/21 1119 180 lb (81.6 kg)   03/10/21 1000 187 lb 3.2 oz (84.9 kg)   03/10/21 0604 180 lb (81.6 kg)         Intake/Output Summary (Last 24 hours) at 3/12/2021 0941  Last data filed at 3/12/2021 0930  Gross per 24 hour   Intake 900 ml   Output 375 ml   Net 525 ml       Physical Exam:  General:  alert, cooperative, no distress, appears stated age  Neck:  no JVD  Lungs:  clear to auscultation bilaterally  Heart:  regular rate and rhythm, S1, S2 normal, no murmur, click, rub or gallop  Abdomen:  abdomen is soft without significant tenderness,   Extremities: No edema  Data Review:     Labs: Results:       Chemistry Recent Labs     03/12/21  0245 03/11/21  0015 03/10/21  0434   * 145* 155*    139 137   K 4.3 4.6 4.3    105 107   CO2 28 26 23   BUN 24* 24* 22*   CREA 1.77* 2.07* 1.79*   CA 8.5 8.1* 8.7   MG  --  2.2 1.9   PHOS  --  4.1  --    AGAP 6 8 7   BUCR 14 12 12   AP  --   --  107   TP  --   --  6.1*   ALB  --   --  3.3*   GLOB  --   --  2.8   AGRAT  --   --  1.2      CBC w/Diff Recent Labs     03/11/21  0015 03/10/21  0434   WBC 6.7 8.9   RBC 3.41* 3.92*   HGB 10.4* 12.2*   HCT 33.0* 38.2    223   GRANS 83* 76*   LYMPH 6* 15*   EOS 0 1      Cardiac Enzymes No results found for: CPK, CK, CKMMB, CKMB, RCK3, CKMBT, CKNDX, CKND1, BRYNN, TROPT, TROIQ, MARCUS, TROPT, TNIPOC, BNP, BNPP   Coagulation Recent Labs     03/10/21  0434   PTP 14.4   INR 1.1   APTT 32.6       Lipid Panel Lab Results   Component Value Date/Time    Cholesterol, total 133 10/22/2018 01:55 PM    Cholesterol, total 122 10/22/2018 01:55 PM    HDL Cholesterol 44 10/22/2018 01:55 PM    HDL Cholesterol 41 10/22/2018 01:55 PM    LDL, calculated 52 10/22/2018 01:55 PM    LDL, calculated 47.4 10/22/2018 01:55 PM    VLDL, calculated 37 10/22/2018 01:55 PM    VLDL, calculated 33.6 10/22/2018 01:55 PM    Triglyceride 185 (H) 10/22/2018 01:55 PM    Triglyceride 168 (H) 10/22/2018 01:55 PM    CHOL/HDL Ratio 3.0 10/22/2018 01:55 PM    CHOL/HDL Ratio 3.0 10/22/2018 01:55 PM      BNP No results found for: BNP, BNPP, XBNPT   Liver Enzymes Recent Labs     03/10/21  0434   TP 6.1*   ALB 3.3*         Digoxin    Thyroid Studies Lab Results   Component Value Date/Time    TSH 1.30 03/10/2021 04:34 AM

## 2021-03-12 NOTE — PROGRESS NOTES
Chart reviewed. Noted that cardiology has cleared pt for discharge. Provided pt with Eliquis 30 day free card. Provided him [facility] phone# & instructed him to call by Monday if hasn't heard from anyone. Called [facility], spoke with Tabatha, made her aware that pt will likely discharge today. Pt's nurse made aware of above. Kiera Narvaez,RN,ext [phone]. Care Management Interventions  PCP Verified by CM: Yes(Dr. Gonzalo Jin, last seen about 3 months ago. )  Palliative Care Criteria Met (RRAT>21 & CHF Dx)?: No  Mode of Transport at Discharge:  Other (see comment)(family)  Transition of Care Consult (CM Consult): [address]: Yes  Discharge Durable Medical Equipment: No  Physical Therapy Consult: No  Occupational Therapy Consult: No  Speech Therapy Consult: No  Current Support Network: Lives Alone  Confirm Follow Up Transport: Self  The Plan for Transition of Care is Related to the Following Treatment Goals : skilled nursing  The Patient and/or Patient Representative was Provided with a Choice of Provider and Agrees with the Discharge Plan?: Yes  Name of the Patient Representative Who was Provided with a Choice of Provider and Agrees with the Discharge Plan: Julien Grande  Freedom of Choice List was Provided with Basic Dialogue that Supports the Patient's Individualized Plan of Care/Goals, Treatment Preferences and Shares the Quality Data Associated with the Providers?: Yes  Discharge Location  Discharge Placement: Home with home health([facility])

## 2021-03-13 ENCOUNTER — HOME CARE VISIT (OUTPATIENT)
Dept: SCHEDULING | Facility: HOME HEALTH | Age: 86
End: 2021-03-13
Payer: MEDICARE

## 2021-03-13 PROCEDURE — 3331090002 HH PPS REVENUE DEBIT

## 2021-03-13 PROCEDURE — G0299 HHS/HOSPICE OF RN EA 15 MIN: HCPCS

## 2021-03-13 PROCEDURE — 3331090001 HH PPS REVENUE CREDIT

## 2021-03-13 PROCEDURE — 400013 HH SOC

## 2021-03-13 PROCEDURE — 400018 HH-NO PAY CLAIM PROCEDURE

## 2021-03-14 PROCEDURE — 3331090001 HH PPS REVENUE CREDIT

## 2021-03-14 PROCEDURE — 3331090002 HH PPS REVENUE DEBIT

## 2021-03-15 ENCOUNTER — HOME CARE VISIT (OUTPATIENT)
Dept: HOME HEALTH SERVICES | Facility: HOME HEALTH | Age: 86
End: 2021-03-15
Payer: MEDICARE

## 2021-03-15 ENCOUNTER — HOME CARE VISIT (OUTPATIENT)
Dept: SCHEDULING | Facility: HOME HEALTH | Age: 86
End: 2021-03-15
Payer: MEDICARE

## 2021-03-15 VITALS
RESPIRATION RATE: 14 BRPM | HEART RATE: 84 BPM | OXYGEN SATURATION: 95 % | TEMPERATURE: 98.8 F | SYSTOLIC BLOOD PRESSURE: 140 MMHG | DIASTOLIC BLOOD PRESSURE: 74 MMHG

## 2021-03-15 LAB — LACTATE BLD-SCNC: 2.3 MMOL/L (ref 0.4–2)

## 2021-03-15 PROCEDURE — 3331090001 HH PPS REVENUE CREDIT

## 2021-03-15 PROCEDURE — G0300 HHS/HOSPICE OF LPN EA 15 MIN: HCPCS

## 2021-03-15 PROCEDURE — 3331090002 HH PPS REVENUE DEBIT

## 2021-03-16 VITALS
RESPIRATION RATE: 18 BRPM | OXYGEN SATURATION: 96 % | DIASTOLIC BLOOD PRESSURE: 80 MMHG | TEMPERATURE: 98.2 F | SYSTOLIC BLOOD PRESSURE: 140 MMHG | HEART RATE: 76 BPM

## 2021-03-16 PROCEDURE — 3331090001 HH PPS REVENUE CREDIT

## 2021-03-16 PROCEDURE — 3331090002 HH PPS REVENUE DEBIT

## 2021-03-17 PROCEDURE — 3331090002 HH PPS REVENUE DEBIT

## 2021-03-17 PROCEDURE — 3331090001 HH PPS REVENUE CREDIT

## 2021-03-18 ENCOUNTER — TELEPHONE (OUTPATIENT)
Dept: CARDIOLOGY CLINIC | Age: 86
End: 2021-03-18

## 2021-03-18 ENCOUNTER — HOME CARE VISIT (OUTPATIENT)
Dept: SCHEDULING | Facility: HOME HEALTH | Age: 86
End: 2021-03-18
Payer: MEDICARE

## 2021-03-18 VITALS
TEMPERATURE: 98.4 F | HEART RATE: 70 BPM | OXYGEN SATURATION: 95 % | DIASTOLIC BLOOD PRESSURE: 78 MMHG | RESPIRATION RATE: 16 BRPM | SYSTOLIC BLOOD PRESSURE: 162 MMHG

## 2021-03-18 PROCEDURE — 3331090001 HH PPS REVENUE CREDIT

## 2021-03-18 PROCEDURE — G0300 HHS/HOSPICE OF LPN EA 15 MIN: HCPCS

## 2021-03-18 PROCEDURE — 3331090002 HH PPS REVENUE DEBIT

## 2021-03-18 NOTE — TELEPHONE ENCOUNTER
PT CALLED STATING BP WAS /78. HR 70. PT WANTS TO COME IN SOONER TO DISCUSS BP MEDICATION. PER FIONA ,MON 3/22/21 11:45 AM. PT AWARE AND UNDERSTANDS.

## 2021-03-19 PROCEDURE — 3331090002 HH PPS REVENUE DEBIT

## 2021-03-19 PROCEDURE — 3331090001 HH PPS REVENUE CREDIT

## 2021-03-19 NOTE — PROGRESS NOTES
Physician Progress Note      Prabhu Sue  CSN #:                  [id number]  :                       1934  ADMIT DATE:       3/10/2021 5:55 AM  DISCH DATE:        3/12/2021 2:29 PM  RESPONDING  PROVIDER #:        Jeremie Cavazos MD          QUERY TEXT:    Dear Hospitalist and cardiologist    Pt admitted with atrial fibrillation and has CHF documented. If possible, please document in progress notes and discharge summary further specificity regarding the type and acuity of CHF:    The medical record reflects the following:  Patient was admitted with AFib. They are noted to have new onset CHF per H&P. Chest x-ray noted, \"Mild cardiomegaly with parenchymal groundglass opacities and interlobular septal thickening consistent with edema. Small bilateral effusions, right greater than left. \" BNP was 8,581. Echo showed an EF of 45 - 50%. Patient was treated with IV Lasix. I&Os were monitored. Progress notes per Dr Dalia Morgan stated that on 3/11 patient had a net loss of -1,925.33ml. Progress note per Dr. Hortensia Reyes on 3/11/2021 stated chronic systolic heart failure. Thank you,  Lorena Chavarria RN, [facility]  Office:  [phone]  Options provided:  -- Acute on Chronic Systolic CHF/HFrEF  -- Acute systolic CHF/HFpEF  -- Chronic Systolic CHF/HFrEF  -- Other - I will add my own diagnosis  -- Disagree - Not applicable / Not valid  -- Disagree - Clinically unable to determine / Unknown  -- Refer to Clinical Documentation Reviewer    PROVIDER RESPONSE TEXT:    This patient is in acute on chronic systolic CHF/HFrEF. Query created by:  Barb Adames on 3/18/2021 10:54 AM      Electronically signed by:  Jeremie Cavazos MD 3/19/2021 5:52 PM

## 2021-03-20 PROCEDURE — 3331090001 HH PPS REVENUE CREDIT

## 2021-03-20 PROCEDURE — 3331090002 HH PPS REVENUE DEBIT

## 2021-03-21 PROCEDURE — 3331090002 HH PPS REVENUE DEBIT

## 2021-03-21 PROCEDURE — 3331090001 HH PPS REVENUE CREDIT

## 2021-03-22 ENCOUNTER — OFFICE VISIT (OUTPATIENT)
Dept: CARDIOLOGY CLINIC | Age: 86
End: 2021-03-22
Payer: MEDICARE

## 2021-03-22 ENCOUNTER — HOME CARE VISIT (OUTPATIENT)
Dept: HOME HEALTH SERVICES | Facility: HOME HEALTH | Age: 86
End: 2021-03-22
Payer: MEDICARE

## 2021-03-22 VITALS
WEIGHT: 177.4 LBS | HEART RATE: 67 BPM | TEMPERATURE: 98 F | DIASTOLIC BLOOD PRESSURE: 66 MMHG | OXYGEN SATURATION: 98 % | BODY MASS INDEX: 27.84 KG/M2 | HEIGHT: 67 IN | RESPIRATION RATE: 16 BRPM | SYSTOLIC BLOOD PRESSURE: 138 MMHG

## 2021-03-22 DIAGNOSIS — E78.5 DYSLIPIDEMIA: ICD-10-CM

## 2021-03-22 DIAGNOSIS — I48.91 ATRIAL FIBRILLATION WITH RAPID VENTRICULAR RESPONSE (HCC): ICD-10-CM

## 2021-03-22 DIAGNOSIS — I63.412 CEREBROVASCULAR ACCIDENT (CVA) DUE TO EMBOLISM OF LEFT MIDDLE CEREBRAL ARTERY (HCC): ICD-10-CM

## 2021-03-22 DIAGNOSIS — I10 ESSENTIAL HYPERTENSION, BENIGN: ICD-10-CM

## 2021-03-22 DIAGNOSIS — I50.22 SYSTOLIC CHF, CHRONIC (HCC): Primary | ICD-10-CM

## 2021-03-22 PROCEDURE — 99214 OFFICE O/P EST MOD 30 MIN: CPT | Performed by: INTERNAL MEDICINE

## 2021-03-22 PROCEDURE — 1111F DSCHRG MED/CURRENT MED MERGE: CPT | Performed by: INTERNAL MEDICINE

## 2021-03-22 PROCEDURE — 3331090001 HH PPS REVENUE CREDIT

## 2021-03-22 PROCEDURE — G8431 POS CLIN DEPRES SCRN F/U DOC: HCPCS | Performed by: INTERNAL MEDICINE

## 2021-03-22 PROCEDURE — G8427 DOCREV CUR MEDS BY ELIG CLIN: HCPCS | Performed by: INTERNAL MEDICINE

## 2021-03-22 PROCEDURE — 1101F PT FALLS ASSESS-DOCD LE1/YR: CPT | Performed by: INTERNAL MEDICINE

## 2021-03-22 PROCEDURE — G8536 NO DOC ELDER MAL SCRN: HCPCS | Performed by: INTERNAL MEDICINE

## 2021-03-22 PROCEDURE — G8419 CALC BMI OUT NRM PARAM NOF/U: HCPCS | Performed by: INTERNAL MEDICINE

## 2021-03-22 PROCEDURE — 3331090002 HH PPS REVENUE DEBIT

## 2021-03-23 PROCEDURE — 3331090001 HH PPS REVENUE CREDIT

## 2021-03-23 PROCEDURE — 3331090002 HH PPS REVENUE DEBIT

## 2021-03-24 PROCEDURE — 3331090001 HH PPS REVENUE CREDIT

## 2021-03-24 PROCEDURE — 3331090002 HH PPS REVENUE DEBIT

## 2021-03-24 NOTE — PROGRESS NOTES
Subjective:      Katarina Helton is in the office today for cardiac evaluation. He is an 68-year-old man that was recently hospitalized at [address] [place] [address] (3/10/2021) for increasing shortness of breath. He was found to be in atrial fibrillation with a rapid ventricular response. The patient have a history of prior embolic CVA. An echocardiogram was done during his hospitalization demonstrating ejection fraction of 45 to 50%. There was mild MR. There was a dilated left atrium. The patient responded well to diuresis. He spontaneously converted to sinus rhythm shortly after admission. In the office today he reports some ongoing nausea. He thinks he has lost about 7 pounds since his hospitalization. He eats twice a day but reports that he does not have much of an appetite. He has not vomited. He has had no chest pain or shortness of breath. He has had no  no GERD symptoms. He has no history of PUD. He takes omeprazole 40 mg every day. He saw gastroenterology and had a thorough evaluation in 2012. He had prior cholecystectomy. He has had no change in bowel habits. He has had no blood per rectum or dark stools. He does not really want to have any additional testing done at this time.             Patient Active Problem List    Diagnosis Date Noted    Systolic CHF, chronic ([address] 03/11/2021    Acute kidney injury ([address] 03/11/2021    CHF (congestive heart failure) ([address] 03/10/2021    Cerebrovascular disease 03/10/2021    Dyslipidemia 03/10/2021    Atrial fibrillation with rapid ventricular response ([address] 03/10/2021    Hypokalemia 10/25/2018    TIA (transient ischemic attack) 10/22/2018    Cerebrovascular accident (CVA) due to embolism of left middle cerebral artery ([address] 10/22/2018    BPH (benign prostatic hyperplasia) 11/18/2013    Esophageal reflux 11/18/2013    Essential hypertension, benign 11/18/2013    Other and unspecified hyperlipidemia 11/18/2013    ED (erectile dysfunction) 11/18/2013    CRF (chronic renal failure) 11/18/2013     Current Outpatient Medications   Medication Sig Dispense Refill    HYDROcodone-acetaminophen (NORCO) 5-325 mg per tablet Take 1 Tab by mouth every six (6) hours as needed for Pain.  zolpidem (AMBIEN) 10 mg tablet Take 10 mg by mouth nightly as needed for Sleep.  apixaban (ELIQUIS) 2.5 mg tablet Take 1 Tab by mouth two (2) times a day. Indications: treatment to prevent blood clots in chronic atrial fibrillation 60 Tab 0    atorvastatin (LIPITOR) 40 mg tablet Take 1 Tab by mouth nightly. 30 Tab 0    MULTIVITAMIN PO Take  one tab by  Mouth Once a Day.  Syringe with Needle,Disp, Tray (ALLERGIST TRAY 1CC 27GX1/2\") 1 mL 27 x 1/2\" tray Please dispense 25 each/one tray BD brand allergy syringes. To be used as directed. 25 Each 2    doxepin (SINEQUAN) 25 mg capsule Take 1 Cap by mouth nightly. 60 Cap 0    metoprolol (LOPRESSOR) 100 mg tablet Take 1 Tab by mouth two (2) times a day. 60 Tab 0    terazosin (HYTRIN) 10 mg capsule Take 1 Cap by mouth nightly. 60 Cap 0    Omeprazole delayed release (PRILOSEC D/R) 20 mg tablet Take 20 mg by mouth daily.  amLODIPine (NORVASC) 5 mg tablet Take 5 mg by mouth daily.  metoprolol tartrate (LOPRESSOR) 100 mg IR tablet Take 100 mg by mouth two (2) times a day.  tri mix compound PGE 20 mcg/ml  Papaverine 30 mg/ml   Phentolamine 2   mg/ml Inject 0.4-0.5 ml prn 10 mL 5    fenofibrate nanocrystallized (TRICOR) 48 mg tablet Take 1 Tab by mouth daily. 30 Tab 0    potassium chloride SR (K-TAB) 20 mEq tablet Take 2 Tabs by mouth daily. 7 Tab 0    tamsulosin (FLOMAX) 0.4 mg capsule Take 1 Cap by mouth daily. 30 Cap 0    cyclobenzaprine (FLEXERIL) 10 mg tablet 10 mg.      HYDROcodone-acetaminophen (NORCO) 5-325 mg per tablet Take 1 Tab by Mouth Every 6 Hours As Needed for Pain.       sucralfate (CARAFATE) 1 gram tablet 1 g.      OTHER,NON-FORMULARY, Please dispense a 5 ml solution of Trimix consisting of PGE 20 mcg/Papaverine 30 mg/Phentolamine 2 mg per ml. Patient is to inject 0.4-0.5 ml. 10 Each 11    cholecalciferol (VITAMIN D3) 1,000 unit tablet Take 2 Tabs by mouth daily. 60 Tab 0    clonazePAM (KLONOPIN) 0.5 mg tablet Take 1 Tab by mouth two (2) times daily as needed. (Patient taking differently: Take 0.5 mg by mouth two (2) times daily as needed for Anxiety or Sleep. Anxiety, sleep) 60 Tab 0    oxyCODONE IR (ROXICODONE) 5 mg immediate release tablet Take 1 Tab by mouth every four (4) hours as needed. 100 Tab 0    zolpidem (AMBIEN) 5 mg tablet Take 1 Tab by mouth nightly as needed for Sleep.  30 Tab 0     No Known Allergies  Past Medical History:   Diagnosis Date    Alcohol dependence, episodic drinking behavior ([facility]     Other and Unspecified    Anxiety state     Arthritis     BPH with obstruction/lower urinary tract symptoms     Carpal tunnel syndrome     Chronic kidney disease, stage III (moderate) (HCC)     Dyslipidemia     Elevated PSA 2011    Esophageal reflux     Essential hypertension, benign     Exercise tolerance finding July 2015    GERD (gastroesophageal reflux disease)     Gouty arthropathy     History of blood transfusion     Hypopotassemia     Hyposmolality and/or hyponatremia     Irritable bowel syndrome     Knee pain, left     Lumbago     Lumbar back pain     S/P TKR (total knee replacement) 2013    Scoliosis deformity of spine     Sleep apnea      Past Surgical History:   Procedure Laterality Date    HX CHOLECYSTECTOMY      HX HERNIA REPAIR      HX OTHER SURGICAL  07.31.2015    HX ADJACENT TISSUE TRANSFER/REARRANGEMENT     Family History   Problem Relation Age of Onset    Hypertension Father     Diabetes Mother      Social History     Tobacco Use   Smoking Status Former Smoker   Smokeless Tobacco Never Used          Review of Systems, additional:  Constitutional: negative  Eyes: negative  Respiratory: negative  Cardiovascular: negative  Gastrointestinal: positive for nausea  Musculoskeletal:negative  Neurological: negative  Behvioral/Psych: negative  Endocrine: negative  ENT: negative    Objective:     Visit Vitals  /66 (BP 1 Location: Right upper arm, BP Cuff Size: Adult)   Pulse 67   Temp 98 °F (36.7 °C)   Resp 16   Ht 5' 7\" (1.702 m)   Wt 177 lb 6.4 oz (80.5 kg)   SpO2 98%   BMI 27.78 kg/m²     General:  alert, cooperative, no distress, appears stated age   Chest Wall: inspection normal - no chest wall deformities or tenderness, respiratory effort normal   Lung: clear to auscultation bilaterally   Heart:  normal rate and regular rhythm, S1 and S2 normal, no murmurs noted, no gallops noted, no JVD   Abdomen: soft, non-tender. Bowel sounds normal. No masses,  no organomegaly   Extremities: extremities normal, atraumatic, no cyanosis or edema Skin: no rashes   Neuro: alert, oriented, normal speech, no focal findings or movement disorder noted     EKG: 3/11/2021. Sinus rhythm. Diffuse nondiagnostic ST and T wave abnormalities. Assessment/Plan:         ICD-10-CM ICD-9-CM    1. Systolic CHF, chronic ([facility] echocardiogram done 3/10/2021 demonstrated ejection fraction 45 to 50%. There was a dilated left atrium. There was moderate MR. Patient has stable symptoms at present. Return in 3 months. I50.22 428.22      428.0    2. Dyslipidemia  E78.5 272.4    3. Essential hypertension, benign , BP is 138/66 in the office today. I10 401.1    4. Cerebrovascular accident (CVA) due to embolism of left middle cerebral artery (HCC) , history of, [id number] score of 6 - 7. Continue Eliquis 2.5 mg twice daily I63.412 434.11    5. Atrial fibrillation with rapid ventricular response (HCC) , regular rhythm in the office today.  I48.91 427.31    .

## 2021-03-25 ENCOUNTER — HOME CARE VISIT (OUTPATIENT)
Dept: SCHEDULING | Facility: HOME HEALTH | Age: 86
End: 2021-03-25
Payer: MEDICARE

## 2021-03-25 PROCEDURE — G0300 HHS/HOSPICE OF LPN EA 15 MIN: HCPCS

## 2021-03-25 PROCEDURE — 3331090002 HH PPS REVENUE DEBIT

## 2021-03-25 PROCEDURE — 3331090001 HH PPS REVENUE CREDIT

## 2021-03-26 VITALS
RESPIRATION RATE: 18 BRPM | TEMPERATURE: 97.7 F | OXYGEN SATURATION: 97 % | HEART RATE: 56 BPM | SYSTOLIC BLOOD PRESSURE: 120 MMHG | DIASTOLIC BLOOD PRESSURE: 62 MMHG

## 2021-03-26 PROCEDURE — 3331090001 HH PPS REVENUE CREDIT

## 2021-03-26 PROCEDURE — 3331090002 HH PPS REVENUE DEBIT

## 2021-03-27 PROCEDURE — 3331090002 HH PPS REVENUE DEBIT

## 2021-03-27 PROCEDURE — 3331090001 HH PPS REVENUE CREDIT

## 2021-03-28 PROCEDURE — 3331090001 HH PPS REVENUE CREDIT

## 2021-03-28 PROCEDURE — 3331090002 HH PPS REVENUE DEBIT

## 2021-03-29 ENCOUNTER — HOME CARE VISIT (OUTPATIENT)
Dept: SCHEDULING | Facility: HOME HEALTH | Age: 86
End: 2021-03-29
Payer: MEDICARE

## 2021-03-29 PROCEDURE — G0300 HHS/HOSPICE OF LPN EA 15 MIN: HCPCS

## 2021-03-29 PROCEDURE — 3331090001 HH PPS REVENUE CREDIT

## 2021-03-29 PROCEDURE — 3331090002 HH PPS REVENUE DEBIT

## 2021-03-30 VITALS
TEMPERATURE: 98.4 F | OXYGEN SATURATION: 97 % | SYSTOLIC BLOOD PRESSURE: 120 MMHG | RESPIRATION RATE: 18 BRPM | DIASTOLIC BLOOD PRESSURE: 60 MMHG | HEART RATE: 69 BPM

## 2021-03-30 PROCEDURE — 3331090002 HH PPS REVENUE DEBIT

## 2021-03-30 PROCEDURE — 3331090001 HH PPS REVENUE CREDIT

## 2021-03-31 PROCEDURE — 3331090001 HH PPS REVENUE CREDIT

## 2021-03-31 PROCEDURE — 3331090002 HH PPS REVENUE DEBIT

## 2021-03-31 NOTE — DISCHARGE SUMMARY
[address]    Name:  Gilles Ingram  MR#:   [id number]  :  1934  ACCOUNT #:  [de-identified]  ADMIT DATE:  03/10/2021  DISCHARGE DATE:  2021    ADMITTING DIAGNOSIS:  Atrial fibrillation with rapid ventricular response in the setting of patient with chronic systolic congestive heart failure. HISTORY OF PRESENT ILLNESS:  The patient is an 80-year-old male who presented to the emergency department with weakness and nausea. He had some mild shortness of breath. In the emergency room, he was found to have symptomatic congestive heart failure with atrial fibrillation and rapid ventricular response. His heart rates were in the 140s on admission. He was admitted for ongoing management. HOSPITAL COURSE:  Cardiology consultation was obtained. The patient had no evidence of acute coronary syndrome during hospitalization. His beta-blocker medications were adjusted. He was treated with diuresis for his congestive heart failure. He has a left ventricular ejection fraction of 45% to 50%. Over time, his rate improved significantly and he was treated with Eliquis 2.5 mg by mouth two times daily. He continued to improve and by 2021, he was considered ready for discharge. He was discharged home. DISCHARGE DIAGNOSES:  1. Atrial fibrillation with rapid ventricular response, improved and converted to normal sinus rhythm. 2.  Paroxysmal atrial fibrillation. 3.  Cerebrovascular disease. 4.  Chronic systolic congestive heart failure with left ventricular ejection fraction of 45% to 50%. 5.  Hypertension. CONDITION:  Improved. ACTIVITIES:  Ad devika. FOLLOWUP:  With his primary care provider in one week. The patient will arrange. MEDICATIONS AT THE TIME OF DISCHARGE:  1.  Eliquis 2.5 mg by mouth two times daily. 2.  Lipitor 40 mg by mouth daily. 3.  Vitamin D3 two tablets by mouth daily.   4.  Clonazepam 0.5 mg tablets by mouth two times daily as needed for anxiety via home regimen. 5.  Flexeril 10 mg by mouth daily as needed. 6.  Doxepin 25 mg by mouth nightly. 7.  TriCor 48 mg by mouth daily. 8.  Norco 5/325 mg one tablet by mouth every 6 hours as needed for pain via home regimen. 9.  Lopressor  mg by mouth two times daily. 10.  Multivitamin by mouth daily. 11.  Prilosec 20 mg by mouth daily. 12.  Oxycodone IR 5 mg via home regimen. 13.  Potassium chloride 20 mEq tablets two tablets by mouth two times daily. 14.  Carafate 1 g by mouth via home regimen. 15.  Flomax 0.4 mg by mouth daily. 16.  Hytrin 10 mg by mouth nightly. 17.  Ambien 5 mg by mouth nightly as needed for sleep. DIET:  Cardiac. DISPOSITION:  Home.     Total Discharge time 35 minutes      Anahi Thakur MD      [id number]  D:  03/30/2021 15:35  T:  03/31/2021 5:31  JOB #:  [id number]

## 2021-04-01 PROCEDURE — 3331090001 HH PPS REVENUE CREDIT

## 2021-04-01 PROCEDURE — 3331090002 HH PPS REVENUE DEBIT

## 2021-04-02 ENCOUNTER — HOME CARE VISIT (OUTPATIENT)
Dept: SCHEDULING | Facility: HOME HEALTH | Age: 86
End: 2021-04-02
Payer: MEDICARE

## 2021-04-02 PROCEDURE — 3331090001 HH PPS REVENUE CREDIT

## 2021-04-02 PROCEDURE — G0300 HHS/HOSPICE OF LPN EA 15 MIN: HCPCS

## 2021-04-02 PROCEDURE — 3331090002 HH PPS REVENUE DEBIT

## 2021-04-03 PROCEDURE — 3331090002 HH PPS REVENUE DEBIT

## 2021-04-03 PROCEDURE — 3331090001 HH PPS REVENUE CREDIT

## 2021-04-04 PROCEDURE — 3331090001 HH PPS REVENUE CREDIT

## 2021-04-04 PROCEDURE — 3331090002 HH PPS REVENUE DEBIT

## 2021-04-05 VITALS
TEMPERATURE: 97.8 F | RESPIRATION RATE: 18 BRPM | OXYGEN SATURATION: 97 % | DIASTOLIC BLOOD PRESSURE: 83 MMHG | SYSTOLIC BLOOD PRESSURE: 140 MMHG | HEART RATE: 52 BPM

## 2021-04-05 PROCEDURE — 3331090002 HH PPS REVENUE DEBIT

## 2021-04-05 PROCEDURE — 3331090001 HH PPS REVENUE CREDIT

## 2021-04-06 ENCOUNTER — TELEPHONE (OUTPATIENT)
Dept: CARDIOLOGY CLINIC | Age: 86
End: 2021-04-06

## 2021-04-06 ENCOUNTER — HOME CARE VISIT (OUTPATIENT)
Dept: SCHEDULING | Facility: HOME HEALTH | Age: 86
End: 2021-04-06
Payer: MEDICARE

## 2021-04-06 PROCEDURE — 3331090001 HH PPS REVENUE CREDIT

## 2021-04-06 PROCEDURE — 3331090002 HH PPS REVENUE DEBIT

## 2021-04-06 PROCEDURE — G0300 HHS/HOSPICE OF LPN EA 15 MIN: HCPCS

## 2021-04-06 NOTE — TELEPHONE ENCOUNTER
Pt called stating his bp is gong up again ,per [facility], 176/?. PT WANTS TO RESTART HIS CLONIDINE 0.1 MG BID AND AMLODIPINE 5 MG 1 PO every day. PER PT. PCP INCREASED ELIQUIS TO 5 MG 1 PO BID.     PRINTED AND PLACED ON [facility] OFFICE

## 2021-04-07 VITALS
TEMPERATURE: 97.8 F | SYSTOLIC BLOOD PRESSURE: 156 MMHG | RESPIRATION RATE: 20 BRPM | DIASTOLIC BLOOD PRESSURE: 76 MMHG | OXYGEN SATURATION: 99 % | HEART RATE: 78 BPM

## 2021-04-07 PROCEDURE — 3331090001 HH PPS REVENUE CREDIT

## 2021-04-07 PROCEDURE — 3331090002 HH PPS REVENUE DEBIT

## 2021-04-08 ENCOUNTER — HOME CARE VISIT (OUTPATIENT)
Dept: SCHEDULING | Facility: HOME HEALTH | Age: 86
End: 2021-04-08
Payer: MEDICARE

## 2021-04-08 PROCEDURE — G0300 HHS/HOSPICE OF LPN EA 15 MIN: HCPCS

## 2021-04-08 PROCEDURE — 3331090002 HH PPS REVENUE DEBIT

## 2021-04-08 PROCEDURE — 3331090001 HH PPS REVENUE CREDIT

## 2021-04-08 NOTE — TELEPHONE ENCOUNTER
Attempted to contact pt at  number, no answer. Lvm for pt to return call to office at [phone]. Will continue to try to contact pt.

## 2021-04-08 NOTE — TELEPHONE ENCOUNTER
Incoming from pt. Two patient Identifiers confirmed. Advised he wanted to know if it was ok to increase eliquis to 5 mg bid. Pt stated his systolic has been ranging in 159R and diastolic has been normal range. Advised pt provider was out of office today but would be in office tomorrow for review. Pharmacy confirmed as [facility] [address] [facility]. Will f/u.

## 2021-04-09 PROCEDURE — 3331090002 HH PPS REVENUE DEBIT

## 2021-04-09 PROCEDURE — 3331090001 HH PPS REVENUE CREDIT

## 2021-04-10 PROCEDURE — 3331090001 HH PPS REVENUE CREDIT

## 2021-04-10 PROCEDURE — 3331090002 HH PPS REVENUE DEBIT

## 2021-04-11 PROCEDURE — 3331090001 HH PPS REVENUE CREDIT

## 2021-04-11 PROCEDURE — 3331090002 HH PPS REVENUE DEBIT

## 2021-04-12 VITALS
SYSTOLIC BLOOD PRESSURE: 128 MMHG | TEMPERATURE: 98.8 F | HEART RATE: 57 BPM | DIASTOLIC BLOOD PRESSURE: 62 MMHG | OXYGEN SATURATION: 96 % | RESPIRATION RATE: 17 BRPM

## 2021-04-12 PROCEDURE — 3331090002 HH PPS REVENUE DEBIT

## 2021-04-12 PROCEDURE — 400018 HH-NO PAY CLAIM PROCEDURE

## 2021-04-12 PROCEDURE — 3331090001 HH PPS REVENUE CREDIT

## 2021-04-13 PROCEDURE — 3331090002 HH PPS REVENUE DEBIT

## 2021-04-13 PROCEDURE — 3331090001 HH PPS REVENUE CREDIT

## 2021-04-13 NOTE — TELEPHONE ENCOUNTER
PT IS AWARE AND UNDERSTANDS PER DR LEO, BACK TO ORIGINAL ELIQUIS DOSE 2.5 MG BID. PT PCP INCREASED TO 5 MG 1 PO BID. PT WILL CALL PCP BACK REGARDING.

## 2021-04-14 PROCEDURE — 3331090002 HH PPS REVENUE DEBIT

## 2021-04-14 PROCEDURE — 3331090001 HH PPS REVENUE CREDIT

## 2021-04-15 PROCEDURE — 3331090001 HH PPS REVENUE CREDIT

## 2021-04-15 PROCEDURE — 3331090002 HH PPS REVENUE DEBIT

## 2021-04-16 ENCOUNTER — HOME CARE VISIT (OUTPATIENT)
Dept: SCHEDULING | Facility: HOME HEALTH | Age: 86
End: 2021-04-16
Payer: MEDICARE

## 2021-04-16 PROCEDURE — 3331090002 HH PPS REVENUE DEBIT

## 2021-04-16 PROCEDURE — 400013 HH SOC

## 2021-04-16 PROCEDURE — G0162 HHC RN E&M PLAN SVS, 15 MIN: HCPCS

## 2021-04-16 PROCEDURE — 3331090001 HH PPS REVENUE CREDIT

## 2021-04-17 PROCEDURE — 3331090001 HH PPS REVENUE CREDIT

## 2021-04-17 PROCEDURE — 3331090002 HH PPS REVENUE DEBIT

## 2021-04-18 PROCEDURE — 3331090001 HH PPS REVENUE CREDIT

## 2021-04-18 PROCEDURE — 3331090002 HH PPS REVENUE DEBIT

## 2021-04-19 ENCOUNTER — TELEPHONE (OUTPATIENT)
Dept: CARDIOLOGY CLINIC | Age: 86
End: 2021-04-19

## 2021-04-19 VITALS
OXYGEN SATURATION: 97 % | HEART RATE: 63 BPM | DIASTOLIC BLOOD PRESSURE: 60 MMHG | RESPIRATION RATE: 14 BRPM | TEMPERATURE: 99.1 F | SYSTOLIC BLOOD PRESSURE: 130 MMHG

## 2021-04-19 PROCEDURE — 3331090002 HH PPS REVENUE DEBIT

## 2021-04-19 PROCEDURE — 3331090001 HH PPS REVENUE CREDIT

## 2021-04-20 PROCEDURE — 3331090001 HH PPS REVENUE CREDIT

## 2021-04-20 PROCEDURE — 3331090002 HH PPS REVENUE DEBIT

## 2021-04-21 PROCEDURE — 3331090002 HH PPS REVENUE DEBIT

## 2021-04-21 PROCEDURE — 3331090001 HH PPS REVENUE CREDIT

## 2021-04-22 ENCOUNTER — HOME CARE VISIT (OUTPATIENT)
Dept: SCHEDULING | Facility: HOME HEALTH | Age: 86
End: 2021-04-22
Payer: MEDICARE

## 2021-04-22 PROCEDURE — 3331090001 HH PPS REVENUE CREDIT

## 2021-04-22 PROCEDURE — 3331090002 HH PPS REVENUE DEBIT

## 2021-04-23 PROCEDURE — 3331090001 HH PPS REVENUE CREDIT

## 2021-04-23 PROCEDURE — 3331090002 HH PPS REVENUE DEBIT

## 2021-04-24 PROCEDURE — 3331090002 HH PPS REVENUE DEBIT

## 2021-04-24 PROCEDURE — 3331090001 HH PPS REVENUE CREDIT

## 2021-04-25 PROCEDURE — 3331090001 HH PPS REVENUE CREDIT

## 2021-04-25 PROCEDURE — 3331090002 HH PPS REVENUE DEBIT

## 2021-04-26 PROCEDURE — 3331090002 HH PPS REVENUE DEBIT

## 2021-04-26 PROCEDURE — 3331090001 HH PPS REVENUE CREDIT

## 2021-04-27 PROCEDURE — 3331090002 HH PPS REVENUE DEBIT

## 2021-04-27 PROCEDURE — 3331090001 HH PPS REVENUE CREDIT

## 2021-04-28 PROCEDURE — 3331090002 HH PPS REVENUE DEBIT

## 2021-04-28 PROCEDURE — 3331090001 HH PPS REVENUE CREDIT

## 2021-04-29 PROCEDURE — 3331090002 HH PPS REVENUE DEBIT

## 2021-04-29 PROCEDURE — 3331090001 HH PPS REVENUE CREDIT

## 2021-04-30 ENCOUNTER — HOME CARE VISIT (OUTPATIENT)
Dept: SCHEDULING | Facility: HOME HEALTH | Age: 86
End: 2021-04-30
Payer: MEDICARE

## 2021-04-30 PROCEDURE — 3331090002 HH PPS REVENUE DEBIT

## 2021-04-30 PROCEDURE — 3331090001 HH PPS REVENUE CREDIT

## 2021-05-01 PROCEDURE — 3331090002 HH PPS REVENUE DEBIT

## 2021-05-01 PROCEDURE — 3331090001 HH PPS REVENUE CREDIT

## 2021-05-02 PROCEDURE — 3331090002 HH PPS REVENUE DEBIT

## 2021-05-02 PROCEDURE — 3331090001 HH PPS REVENUE CREDIT

## 2021-05-03 PROCEDURE — 3331090002 HH PPS REVENUE DEBIT

## 2021-05-03 PROCEDURE — 3331090001 HH PPS REVENUE CREDIT

## 2021-05-04 PROCEDURE — 3331090001 HH PPS REVENUE CREDIT

## 2021-05-04 PROCEDURE — 3331090002 HH PPS REVENUE DEBIT

## 2021-05-05 PROCEDURE — 3331090002 HH PPS REVENUE DEBIT

## 2021-05-05 PROCEDURE — 3331090001 HH PPS REVENUE CREDIT

## 2021-05-06 ENCOUNTER — HOME CARE VISIT (OUTPATIENT)
Dept: HOME HEALTH SERVICES | Facility: HOME HEALTH | Age: 86
End: 2021-05-06
Payer: MEDICARE

## 2021-05-06 PROCEDURE — 3331090002 HH PPS REVENUE DEBIT

## 2021-05-06 PROCEDURE — 3331090001 HH PPS REVENUE CREDIT

## 2021-05-07 PROCEDURE — 3331090002 HH PPS REVENUE DEBIT

## 2021-05-07 PROCEDURE — 3331090001 HH PPS REVENUE CREDIT

## 2021-05-08 PROCEDURE — 3331090002 HH PPS REVENUE DEBIT

## 2021-05-08 PROCEDURE — 3331090001 HH PPS REVENUE CREDIT

## 2021-05-09 PROCEDURE — 3331090002 HH PPS REVENUE DEBIT

## 2021-05-09 PROCEDURE — 3331090001 HH PPS REVENUE CREDIT

## 2021-05-10 PROCEDURE — 3331090001 HH PPS REVENUE CREDIT

## 2021-05-10 PROCEDURE — 3331090002 HH PPS REVENUE DEBIT

## 2021-05-11 ENCOUNTER — HOME CARE VISIT (OUTPATIENT)
Dept: HOME HEALTH SERVICES | Facility: HOME HEALTH | Age: 86
End: 2021-05-11
Payer: MEDICARE

## 2021-05-11 PROCEDURE — 3331090001 HH PPS REVENUE CREDIT

## 2021-05-11 PROCEDURE — 3331090002 HH PPS REVENUE DEBIT

## 2021-05-11 PROCEDURE — 3331090003 HH PPS REVENUE ADJ

## 2021-05-13 NOTE — TELEPHONE ENCOUNTER
PCP: Miguelina Jaffe MD    Last appt: 3/22/2021  Future Appointments   Date Time Provider Carlos Alberto Garciaisti   6/25/2021 11:30 AM Job Uribe MD [facility] BS AMB       Requested Prescriptions      No prescriptions requested or ordered in this encounter       Request for a 30 or 90 day supply?  Provider Discretion        Other Comments:

## 2021-05-14 RX ORDER — APIXABAN 2.5 MG/1
TABLET, FILM COATED ORAL
Qty: 60 TAB | Refills: 0 | Status: SHIPPED | OUTPATIENT
Start: 2021-05-14 | End: 2021-07-12 | Stop reason: SDUPTHER

## 2021-06-25 ENCOUNTER — OFFICE VISIT (OUTPATIENT)
Dept: CARDIOLOGY CLINIC | Age: 86
End: 2021-06-25
Payer: MEDICARE

## 2021-06-25 DIAGNOSIS — I50.22 SYSTOLIC CHF, CHRONIC (HCC): Primary | ICD-10-CM

## 2021-06-25 PROCEDURE — 1101F PT FALLS ASSESS-DOCD LE1/YR: CPT | Performed by: INTERNAL MEDICINE

## 2021-06-25 PROCEDURE — G8536 NO DOC ELDER MAL SCRN: HCPCS | Performed by: INTERNAL MEDICINE

## 2021-06-25 PROCEDURE — G8510 SCR DEP NEG, NO PLAN REQD: HCPCS | Performed by: INTERNAL MEDICINE

## 2021-06-25 PROCEDURE — G8419 CALC BMI OUT NRM PARAM NOF/U: HCPCS | Performed by: INTERNAL MEDICINE

## 2021-06-25 PROCEDURE — 99214 OFFICE O/P EST MOD 30 MIN: CPT | Performed by: INTERNAL MEDICINE

## 2021-06-25 PROCEDURE — G8427 DOCREV CUR MEDS BY ELIG CLIN: HCPCS | Performed by: INTERNAL MEDICINE

## 2021-06-25 NOTE — PROGRESS NOTES
Elizabeth Matamoros presents today for   Chief Complaint   Patient presents with   [address] preferred language for health care discussion is english/other. Personal Protective Equipment:   Personal Protective Equipment was used including: mask-surgical and hands-gloves. Patient was placed on no precaution(s). Patient was masked. Precautions:   Patient currently on None  Patient currently roomed with door closed    Is someone accompanying this pt? no    Is the patient using any DME equipment during OV? yes    Depression Screening:  3 most recent PHQ Screens 6/25/2021   Little interest or pleasure in doing things Not at all   Feeling down, depressed, irritable, or hopeless Not at all   Total Score PHQ 2 0   Trouble falling or staying asleep, or sleeping too much -   Feeling tired or having little energy -   Poor appetite, weight loss, or overeating -   Feeling bad about yourself - or that you are a failure or have let yourself or your family down -   Trouble concentrating on things such as school, work, reading, or watching TV -   Moving or speaking so slowly that other people could have noticed; or the opposite being so fidgety that others notice -   Thoughts of being better off dead, or hurting yourself in some way -   PHQ 9 Score -       Learning Assessment:  No flowsheet data found. Abuse Screening:  No flowsheet data found. Fall Risk  Fall Risk Assessment, last 12 mths 6/25/2021   Able to walk? Yes   Fall in past 12 months? 0   Do you feel unsteady? 0   Are you worried about falling 0   Is TUG test greater than 12 seconds? -   Is the gait abnormal? -   Fall with injury? -       Pt currently taking Anticoagulant therapy? no    Coordination of Care:  1. Have you been to the ER, urgent care clinic since your last visit? Hospitalized since your last visit? no    2.  Have you seen or consulted any other health care providers outside of the [address] [place] [address] [place] since your last visit? Include any pap smears or colon screening.  no

## 2021-07-04 NOTE — PROGRESS NOTES
Subjective:      Theresa Briones is in the office today for cardiac re-evaluation. He is an 24-year-old man that was hospitalized at [facility] (3/10/2021) for increasing shortness of breath. He was found to be in atrial fibrillation with a rapid ventricular response. The patient have a history of prior embolic CVA. An echocardiogram was done during his hospitalization demonstrating an ejection fraction of 45 to 50%. There was mild MR. There was a dilated left atrium. The patient responded well to diuresis. He spontaneously converted to sinus rhythm shortly after admission. In the office today, he reports that his \"my stomach is bothering me \". He has had no chest pain. He has had no shortness of breath. He has had no peripheral swelling. He feels \"bloated\". He will be seeing his doctor up at [facility]. Patient Active Problem List    Diagnosis Date Noted    Systolic CHF, chronic ([facility] 03/11/2021    Acute kidney injury ([address] 03/11/2021    CHF (congestive heart failure) ([address] 03/10/2021    Cerebrovascular disease 03/10/2021    Dyslipidemia 03/10/2021    Atrial fibrillation with rapid ventricular response ([address] 03/10/2021    Hypokalemia 10/25/2018    TIA (transient ischemic attack) 10/22/2018    Cerebrovascular accident (CVA) due to embolism of left middle cerebral artery ([address] 10/22/2018    BPH (benign prostatic hyperplasia) 11/18/2013    Esophageal reflux 11/18/2013    Essential hypertension, benign 11/18/2013    Other and unspecified hyperlipidemia 11/18/2013    ED (erectile dysfunction) 11/18/2013    CRF (chronic renal failure) 11/18/2013     Current Outpatient Medications   Medication Sig Dispense Refill    apixaban (ELIQUIS) 2.5 mg tablet Take 1 Tab by mouth two (2) times a day. Indications: treatment to prevent blood clots in chronic atrial fibrillation 60 Tab 11    Eliquis 2.5 mg tablet TAKE 1 TABLET BY MOUTH TWICE A DAY 60 Tab 0    ELIQUIS Take 10 mg by mouth daily.       amLODIPine (NORVASC) 5 mg tablet Take 5 mg by mouth daily.  HYDROcodone-acetaminophen (NORCO) 5-325 mg per tablet Take 1 Tab by mouth every six (6) hours as needed for Pain.  metoprolol tartrate (LOPRESSOR) 100 mg IR tablet Take 100 mg by mouth two (2) times a day.  zolpidem (AMBIEN) 10 mg tablet Take 10 mg by mouth nightly as needed for Sleep.  tri mix compound PGE 20 mcg/ml  Papaverine 30 mg/ml   Phentolamine 2   mg/ml Inject 0.4-0.5 ml prn 10 mL 5    atorvastatin (LIPITOR) 40 mg tablet Take 1 Tab by mouth nightly. 30 Tab 0    fenofibrate nanocrystallized (TRICOR) 48 mg tablet Take 1 Tab by mouth daily. 30 Tab 0    potassium chloride SR (K-TAB) 20 mEq tablet Take 2 Tabs by mouth daily. 7 Tab 0    tamsulosin (FLOMAX) 0.4 mg capsule Take 1 Cap by mouth daily. 30 Cap 0    cyclobenzaprine (FLEXERIL) 10 mg tablet 10 mg.      HYDROcodone-acetaminophen (NORCO) 5-325 mg per tablet Take 1 Tab by Mouth Every 6 Hours As Needed for Pain.  MULTIVITAMIN PO Take  one tab by  Mouth Once a Day.  sucralfate (CARAFATE) 1 gram tablet 1 g.      OTHER,NON-FORMULARY, Please dispense a 5 ml solution of Trimix consisting of PGE 20 mcg/Papaverine 30 mg/Phentolamine 2 mg per ml. Patient is to inject 0.4-0.5 ml. 10 Each 11    Syringe with Needle,Disp, Tray (ALLERGIST TRAY 1CC 27GX1/2\") 1 mL 27 x 1/2\" tray Please dispense 25 each/one tray BD brand allergy syringes. To be used as directed. 25 Each 2    cholecalciferol (VITAMIN D3) 1,000 unit tablet Take 2 Tabs by mouth daily. 60 Tab 0    clonazePAM (KLONOPIN) 0.5 mg tablet Take 1 Tab by mouth two (2) times daily as needed. (Patient taking differently: Take 0.5 mg by mouth two (2) times daily as needed for Anxiety or Sleep. Anxiety, sleep) 60 Tab 0    doxepin (SINEQUAN) 25 mg capsule Take 1 Cap by mouth nightly. 60 Cap 0    metoprolol (LOPRESSOR) 100 mg tablet Take 1 Tab by mouth two (2) times a day.  60 Tab 0    terazosin (HYTRIN) 10 mg capsule Take 1 Cap by mouth nightly. 60 Cap 0    oxyCODONE IR (ROXICODONE) 5 mg immediate release tablet Take 1 Tab by mouth every four (4) hours as needed. 100 Tab 0    zolpidem (AMBIEN) 5 mg tablet Take 1 Tab by mouth nightly as needed for Sleep. 30 Tab 0    Omeprazole delayed release (PRILOSEC D/R) 20 mg tablet Take 20 mg by mouth daily. No Known Allergies  Past Medical History:   Diagnosis Date    Alcohol dependence, episodic drinking behavior ([address]     Other and Unspecified    Anxiety state     Arthritis     BPH with obstruction/lower urinary tract symptoms     Carpal tunnel syndrome     Chronic kidney disease, stage III (moderate) (HCC)     Dyslipidemia     Elevated PSA 2011    Esophageal reflux     Essential hypertension, benign     Exercise tolerance finding July 2015    GERD (gastroesophageal reflux disease)     Gouty arthropathy     History of blood transfusion     Hypopotassemia     Hyposmolality and/or hyponatremia     Irritable bowel syndrome     Knee pain, left     Lumbago     Lumbar back pain     S/P TKR (total knee replacement) 2013    Scoliosis deformity of spine     Sleep apnea      Past Surgical History:   Procedure Laterality Date    HX CHOLECYSTECTOMY      HX HERNIA REPAIR      HX OTHER SURGICAL  07.31.2015    HX ADJACENT TISSUE TRANSFER/REARRANGEMENT     Family History   Problem Relation Age of Onset    Hypertension Father     Diabetes Mother      Social History     Tobacco Use   Smoking Status Former Smoker   Smokeless Tobacco Never Used          Review of Systems, additional:  Constitutional: negative  Eyes: negative  Respiratory: negative  Cardiovascular: negative  Gastrointestinal: positive for nausea  Musculoskeletal:negative  Neurological: negative  Behvioral/Psych: negative  Endocrine: negative  ENT: negative    Objective: There were no vitals taken for this visit.   General:  alert, cooperative, no distress, appears stated age   Chest Wall: inspection normal - no chest wall deformities or tenderness, respiratory effort normal   Lung: clear to auscultation bilaterally   Heart:  normal rate and regular rhythm, S1 and S2 normal, no murmurs noted, no gallops noted, no JVD   Abdomen: soft, non-tender. Bowel sounds normal. No masses,  no organomegaly   Extremities: extremities normal, atraumatic, no cyanosis or edema Skin: no rashes   Neuro: alert, oriented, normal speech, no focal findings or movement disorder noted     EKG: 3/11/2021. Sinus rhythm. Diffuse nondiagnostic ST and T wave abnormalities. Assessment/Plan:         ICD-10-CM ICD-9-CM    1. Systolic CHF, chronic ([facility] [address] [facility] echocardiogram done 3/10/2021 demonstrated ejection fraction 45 to 50%. There was a dilated left atrium. There was moderate MR. Patient has stable symptoms at present. Return in 4 months. I50.22 428.22      428.0    2. Dyslipidemia  E78.5 272.4    3. Essential hypertension, benign , BP is 120/58 in the office today. I10 401.1    4. Cerebrovascular accident (CVA) due to embolism of left middle cerebral artery (HCC) , history of, [id number] score of 6 - 7. Continue Eliquis 2.5 mg twice daily I63.412 434.11    5. Atrial fibrillation with rapid ventricular response (HCC) , regular rhythm in the office today.  I48.91 427.31    .

## 2021-07-30 NOTE — TELEPHONE ENCOUNTER
PCP: Anuj Mcdaniels MD    Last appt: 6/25/2021  Future Appointments   Date Time Provider Carlos Alberto Dougherty   10/27/2021  9:15 AM Almita Sarabia MD [facility] BS AMB       Requested Prescriptions     Pending Prescriptions Disp Refills    apixaban (ELIQUIS) 2.5 mg tablet 60 Tablet 0     Sig: Take 1 Tablet by mouth two (2) times a day. Indications: treatment to prevent blood clots in chronic atrial fibrillation       Request for a 30 or 90 day supply?  Provider Discretion    Pharmacy:     Other Comments:PT IS OUT AND HAS NOT RECEIVED MAIL ORDER YET

## 2021-07-30 NOTE — TELEPHONE ENCOUNTER
Patient prefers prescription be sent to local pharmacy because he is completely out of medication. Requested Prescriptions     Pending Prescriptions Disp Refills    apixaban (ELIQUIS) 2.5 mg tablet 180 Tablet 3     Sig: Take 1 Tablet by mouth two (2) times a day.  Indications: treatment to prevent blood clots in chronic atrial fibrillation

## 2021-07-30 NOTE — TELEPHONE ENCOUNTER
Requested Prescriptions     Pending Prescriptions Disp Refills    apixaban (ELIQUIS) 2.5 mg tablet 180 Tablet 3     Sig: Take 1 Tablet by mouth two (2) times a day. Indications: treatment to prevent blood clots in chronic atrial fibrillation     Patients is completely out.

## 2021-10-27 ENCOUNTER — OFFICE VISIT (OUTPATIENT)
Dept: CARDIOLOGY CLINIC | Age: 86
End: 2021-10-27
Payer: MEDICARE

## 2021-10-27 VITALS
DIASTOLIC BLOOD PRESSURE: 48 MMHG | OXYGEN SATURATION: 98 % | SYSTOLIC BLOOD PRESSURE: 98 MMHG | BODY MASS INDEX: 27.25 KG/M2 | TEMPERATURE: 99.1 F | WEIGHT: 174 LBS | RESPIRATION RATE: 16 BRPM | HEART RATE: 48 BPM

## 2021-10-27 DIAGNOSIS — I48.91 ATRIAL FIBRILLATION WITH RAPID VENTRICULAR RESPONSE (HCC): Primary | ICD-10-CM

## 2021-10-27 PROCEDURE — G8419 CALC BMI OUT NRM PARAM NOF/U: HCPCS | Performed by: INTERNAL MEDICINE

## 2021-10-27 PROCEDURE — G8427 DOCREV CUR MEDS BY ELIG CLIN: HCPCS | Performed by: INTERNAL MEDICINE

## 2021-10-27 PROCEDURE — G8536 NO DOC ELDER MAL SCRN: HCPCS | Performed by: INTERNAL MEDICINE

## 2021-10-27 PROCEDURE — 99214 OFFICE O/P EST MOD 30 MIN: CPT | Performed by: INTERNAL MEDICINE

## 2021-10-27 PROCEDURE — G8510 SCR DEP NEG, NO PLAN REQD: HCPCS | Performed by: INTERNAL MEDICINE

## 2021-10-27 PROCEDURE — 1101F PT FALLS ASSESS-DOCD LE1/YR: CPT | Performed by: INTERNAL MEDICINE

## 2021-10-27 NOTE — PROGRESS NOTES
Adin Solorzano presents today for   Chief Complaint   Patient presents with   [facility] preferred language for health care discussion is english/other. Personal Protective Equipment:   Personal Protective Equipment was used including: mask-surgical and hands-gloves. Patient was placed on no precaution(s). Patient was masked. Precautions:   Patient currently on None  Patient currently roomed with door closed    Is someone accompanying this pt?  no    Is the patient using any DME equipment during OV? Yes cane    Depression Screening:  3 most recent PHQ Screens 10/27/2021   Little interest or pleasure in doing things Not at all   Feeling down, depressed, irritable, or hopeless Not at all   Total Score PHQ 2 0   Trouble falling or staying asleep, or sleeping too much -   Feeling tired or having little energy -   Poor appetite, weight loss, or overeating -   Feeling bad about yourself - or that you are a failure or have let yourself or your family down -   Trouble concentrating on things such as school, work, reading, or watching TV -   Moving or speaking so slowly that other people could have noticed; or the opposite being so fidgety that others notice -   Thoughts of being better off dead, or hurting yourself in some way -   PHQ 9 Score -       Learning Assessment:  No flowsheet data found. Abuse Screening:  No flowsheet data found. Fall Risk  Fall Risk Assessment, last 12 mths 10/27/2021   Able to walk? Yes   Fall in past 12 months? -   Do you feel unsteady? -   Are you worried about falling -   Is TUG test greater than 12 seconds? -   Is the gait abnormal? -   Fall with injury? -       Pt currently taking Anticoagulant therapy? no    Coordination of Care:  1. Have you been to the ER, urgent care clinic since your last visit? Hospitalized since your last visit? no    2.  Have you seen or consulted any other health care providers outside of the [address] [place] [address] since your last visit? Include any pap smears or colon screening.  no

## 2021-11-01 NOTE — PROGRESS NOTES
Subjective:      Karen Mcclellan is in the office today for cardiac re-evaluation. He is an 71-year-old man that was hospitalized at [facility] (3/10/2021) for increasing shortness of breath. He was found to be in atrial fibrillation with a rapid ventricular response. The patient had a history of prior embolic CVA. An echocardiogram was done during his hospitalization demonstrating an ejection fraction of 45 to 50%. There was mild MR. There was a dilated left atrium. The patient responded well to diuresis. He spontaneously converted to sinus rhythm shortly after admission. In the office on 6/25/2021, he reported that  \"my stomach is bothering me \". He has had no chest pain. He has had no shortness of breath. He has had no peripheral swelling. He felt \"bloated\". Since his last visit, an upper GI with air-contrast was ordered and completed. Results are as noted below. There was mention of possibly considering CT contrast enhanced meant to further evaluate and impression esophagus at the level of the aortic arch which was felt possibly due to a vascular ring such as an aberrant right subclavian artery. Other findings were of significant GE reflux and esophageal dysmotility.          Patient Active Problem List    Diagnosis Date Noted    Systolic CHF, chronic ([address] 03/11/2021    Acute kidney injury ([address] 03/11/2021    CHF (congestive heart failure) ([address] 03/10/2021    Cerebrovascular disease 03/10/2021    Dyslipidemia 03/10/2021    Atrial fibrillation with rapid ventricular response ([address] 03/10/2021    Hypokalemia 10/25/2018    TIA (transient ischemic attack) 10/22/2018    Cerebrovascular accident (CVA) due to embolism of left middle cerebral artery ([address] 10/22/2018    BPH (benign prostatic hyperplasia) 11/18/2013    Esophageal reflux 11/18/2013    Essential hypertension, benign 11/18/2013    ED (erectile dysfunction) 11/18/2013    CRF (chronic renal failure) 11/18/2013     Current Outpatient Medications Medication Sig Dispense Refill    apixaban (ELIQUIS) 2.5 mg tablet Take 1 Tablet by mouth two (2) times a day. Indications: treatment to prevent blood clots in chronic atrial fibrillation 60 Tablet 6    zolpidem (AMBIEN) 10 mg tablet Take 10 mg by mouth nightly as needed for Sleep.  atorvastatin (LIPITOR) 40 mg tablet Take 1 Tab by mouth nightly. (Patient taking differently: Take 40 mg by mouth every Monday, Wednesday, Friday.) 30 Tab 0    HYDROcodone-acetaminophen (NORCO) 5-325 mg per tablet Take 1 Tab by Mouth Every 6 Hours As Needed for Pain.  MULTIVITAMIN PO Take  one tab by  Mouth Once a Day.  clonazePAM (KLONOPIN) 0.5 mg tablet Take 1 Tab by mouth two (2) times daily as needed. (Patient taking differently: Take 0.5 mg by mouth two (2) times daily as needed for Anxiety or Sleep. Anxiety, sleep) 60 Tab 0    metoprolol (LOPRESSOR) 100 mg tablet Take 1 Tab by mouth two (2) times a day. 60 Tab 0    terazosin (HYTRIN) 10 mg capsule Take 1 Cap by mouth nightly. 60 Cap 0    ELIQUIS Take 10 mg by mouth daily. (Patient not taking: Reported on 10/27/2021)      HYDROcodone-acetaminophen (NORCO) 5-325 mg per tablet Take 1 Tab by mouth every six (6) hours as needed for Pain. (Patient not taking: Reported on 10/27/2021)      metoprolol tartrate (LOPRESSOR) 100 mg IR tablet Take 100 mg by mouth two (2) times a day. (Patient not taking: Reported on 10/27/2021)      tri mix compound PGE 20 mcg/ml  Papaverine 30 mg/ml   Phentolamine 2   mg/ml Inject 0.4-0.5 ml prn (Patient not taking: Reported on 10/27/2021) 10 mL 5    fenofibrate nanocrystallized (TRICOR) 48 mg tablet Take 1 Tab by mouth daily. (Patient not taking: Reported on 10/27/2021) 30 Tab 0    potassium chloride SR (K-TAB) 20 mEq tablet Take 2 Tabs by mouth daily. (Patient not taking: Reported on 10/27/2021) 7 Tab 0    tamsulosin (FLOMAX) 0.4 mg capsule Take 1 Cap by mouth daily.  (Patient not taking: Reported on 10/27/2021) 30 Cap 0    cyclobenzaprine (FLEXERIL) 10 mg tablet 10 mg. (Patient not taking: Reported on 10/27/2021)      sucralfate (CARAFATE) 1 gram tablet 1 g. (Patient not taking: Reported on 10/27/2021)      OTHER,NON-FORMULARY, Please dispense a 5 ml solution of Trimix consisting of PGE 20 mcg/Papaverine 30 mg/Phentolamine 2 mg per ml. Patient is to inject 0.4-0.5 ml. (Patient not taking: Reported on 10/27/2021) 10 Each 11    Syringe with Needle,Disp, Tray (ALLERGIST TRAY 1CC 27GX1/2\") 1 mL 27 x 1/2\" tray Please dispense 25 each/one tray BD brand allergy syringes. To be used as directed. (Patient not taking: Reported on 10/27/2021) 25 Each 2    cholecalciferol (VITAMIN D3) 1,000 unit tablet Take 2 Tabs by mouth daily. 60 Tab 0    doxepin (SINEQUAN) 25 mg capsule Take 1 Cap by mouth nightly. (Patient not taking: Reported on 10/27/2021) 60 Cap 0    oxyCODONE IR (ROXICODONE) 5 mg immediate release tablet Take 1 Tab by mouth every four (4) hours as needed. (Patient not taking: Reported on 10/27/2021) 100 Tab 0    zolpidem (AMBIEN) 5 mg tablet Take 1 Tab by mouth nightly as needed for Sleep. (Patient not taking: Reported on 10/27/2021) 30 Tab 0    omeprazole (PRILOSEC) 40 mg capsule Take 40 mg by mouth daily.        No Known Allergies  Past Medical History:   Diagnosis Date    Alcohol dependence, episodic drinking behavior ([facility]     Other and Unspecified    Anxiety state     Arthritis     BPH with obstruction/lower urinary tract symptoms     Carpal tunnel syndrome     Chronic kidney disease, stage III (moderate) (HCC)     Dyslipidemia     Elevated PSA 2011    Esophageal reflux     Essential hypertension, benign     Exercise tolerance finding July 2015    GERD (gastroesophageal reflux disease)     Gouty arthropathy     History of blood transfusion     Hypopotassemia     Hyposmolality and/or hyponatremia     Irritable bowel syndrome     Knee pain, left     Lumbago     Lumbar back pain     S/P TKR (total knee replacement) 2013    Scoliosis deformity of spine     Sleep apnea      Past Surgical History:   Procedure Laterality Date    HX CHOLECYSTECTOMY      HX HERNIA REPAIR      HX OTHER SURGICAL  07.31.2015    HX ADJACENT TISSUE TRANSFER/REARRANGEMENT     Family History   Problem Relation Age of Onset    Hypertension Father     Diabetes Mother      Social History     Tobacco Use   Smoking Status Former Smoker   Smokeless Tobacco Never Used          Review of Systems, additional:  Constitutional: negative  Eyes: negative  Respiratory: negative  Cardiovascular: negative  Gastrointestinal: positive for nausea  Musculoskeletal:negative  Neurological: negative  Behvioral/Psych: negative  Endocrine: negative  ENT: negative    Objective:     Visit Vitals  BP (!) 98/48   Pulse (!) 48   Temp 99.1 °F (37.3 °C) (Temporal)   Resp 16   Wt 78.9 kg (174 lb)   SpO2 98%   BMI 27.25 kg/m²     General:  alert, cooperative, no distress, appears stated age   Chest Wall: inspection normal - no chest wall deformities or tenderness, respiratory effort normal   Lung: clear to auscultation bilaterally   Heart:  normal rate and regular rhythm, S1 and S2 normal, no murmurs noted, no gallops noted, no JVD   Abdomen: soft, non-tender. Bowel sounds normal. No masses,  no organomegaly   Extremities: extremities normal, atraumatic, no cyanosis or edema Skin: no rashes   Neuro: alert, oriented, normal speech, no focal findings or movement disorder noted     EKG: 3/11/2021. Sinus rhythm. Diffuse nondiagnostic ST and T wave abnormalities. Assessment/Plan:         ICD-10-CM ICD-9-CM    1. Systolic CHF, chronic ([facility] echocardiogram done 3/10/2021 demonstrated ejection fraction 45 to 50%. There was a dilated left atrium. There was moderate MR. Patient has stable symptoms at present. Return in 6 months. I50.22 428.22      428.0    2. Dyslipidemia  E78.5 272.4    3. Essential hypertension, benign , BP is 120/58 in the office today.  I10 401.1 4. Cerebrovascular accident (CVA) due to embolism of left middle cerebral artery (HCC) , history of, [id number] score of 6 - 7. Continue Eliquis 2.5 mg twice daily I63.412 434.11    5. Atrial fibrillation with rapid ventricular response (HCC) , regular rhythm in the office today. I48.91 427.31    6. GERD, completed upper GI with air-contrast on 7/21/2021. There was esophageal dysmotility and significant GE reflux. Note was made of the impression impression on the esophagus at the level of the aortic arch possibly due to vascular ring such as apparent right subclavian artery. A contrast-enhanced CT to further evaluate was recommended.

## 2022-03-18 PROBLEM — I67.9 CEREBROVASCULAR DISEASE: Status: ACTIVE | Noted: 2021-03-10

## 2022-03-19 PROBLEM — I50.9 CHF (CONGESTIVE HEART FAILURE) (HCC): Status: ACTIVE | Noted: 2021-03-10

## 2022-03-19 PROBLEM — E78.5 DYSLIPIDEMIA: Status: ACTIVE | Noted: 2021-03-10

## 2022-03-19 PROBLEM — I50.22 SYSTOLIC CHF, CHRONIC (HCC): Status: ACTIVE | Noted: 2021-03-11

## 2022-03-19 PROBLEM — G45.9 TIA (TRANSIENT ISCHEMIC ATTACK): Status: ACTIVE | Noted: 2018-10-22

## 2022-03-19 PROBLEM — I63.412 CEREBROVASCULAR ACCIDENT (CVA) DUE TO EMBOLISM OF LEFT MIDDLE CEREBRAL ARTERY (HCC): Status: ACTIVE | Noted: 2018-10-22

## 2022-03-19 PROBLEM — I48.91 ATRIAL FIBRILLATION WITH RAPID VENTRICULAR RESPONSE (HCC): Status: ACTIVE | Noted: 2021-03-10

## 2022-03-19 PROBLEM — N17.9 ACUTE KIDNEY INJURY (HCC): Status: ACTIVE | Noted: 2021-03-11

## 2022-03-20 PROBLEM — E87.6 HYPOKALEMIA: Status: ACTIVE | Noted: 2018-10-25

## 2022-03-31 RX ORDER — APIXABAN 2.5 MG/1
TABLET, FILM COATED ORAL
Qty: 60 TABLET | Refills: 6 | Status: SHIPPED | OUTPATIENT
Start: 2022-03-31

## 2022-05-02 ENCOUNTER — OFFICE VISIT (OUTPATIENT)
Dept: CARDIOLOGY CLINIC | Age: 87
End: 2022-05-02
Payer: MEDICARE

## 2022-05-02 VITALS
SYSTOLIC BLOOD PRESSURE: 124 MMHG | HEART RATE: 58 BPM | DIASTOLIC BLOOD PRESSURE: 70 MMHG | BODY MASS INDEX: 27.97 KG/M2 | WEIGHT: 178.6 LBS | TEMPERATURE: 98.1 F | OXYGEN SATURATION: 98 %

## 2022-05-02 DIAGNOSIS — I48.91 ATRIAL FIBRILLATION WITH RAPID VENTRICULAR RESPONSE (HCC): Primary | ICD-10-CM

## 2022-05-02 PROCEDURE — 99214 OFFICE O/P EST MOD 30 MIN: CPT | Performed by: INTERNAL MEDICINE

## 2022-05-02 PROCEDURE — G8536 NO DOC ELDER MAL SCRN: HCPCS | Performed by: INTERNAL MEDICINE

## 2022-05-02 PROCEDURE — G8427 DOCREV CUR MEDS BY ELIG CLIN: HCPCS | Performed by: INTERNAL MEDICINE

## 2022-05-02 PROCEDURE — 1101F PT FALLS ASSESS-DOCD LE1/YR: CPT | Performed by: INTERNAL MEDICINE

## 2022-05-02 PROCEDURE — G8419 CALC BMI OUT NRM PARAM NOF/U: HCPCS | Performed by: INTERNAL MEDICINE

## 2022-05-02 PROCEDURE — G8510 SCR DEP NEG, NO PLAN REQD: HCPCS | Performed by: INTERNAL MEDICINE

## 2022-05-02 NOTE — PROGRESS NOTES
Identified pt with two pt identifiers(name and ). Reviewed record in preparation for visit and have obtained necessary documentation. Emma Jones presents today for No chief complaint on file. Pt denies DIZZINESS, SOB, CHEST PAIN/ PRESSURE, FATIGUE/WEAKNESS, HEADACHES, SWELLING. Emma Jones preferred language for health care discussion is english/other. Personal Protective Equipment:   Personal Protective Equipment was used including: mask-surgical and hands-gloves. Patient was placed on no precaution(s). Patient was masked. Precautions:   Patient currently on None  Patient currently roomed with door closed. Is someone accompanying this pt? no    Is the patient using any DME equipment during [address]? Yes  Cane     Depression Screening:  3 most recent PHQ Screens 10/27/2021   Little interest or pleasure in doing things Not at all   Feeling down, depressed, irritable, or hopeless Not at all   Total Score PHQ 2 0   Trouble falling or staying asleep, or sleeping too much -   Feeling tired or having little energy -   Poor appetite, weight loss, or overeating -   Feeling bad about yourself - or that you are a failure or have let yourself or your family down -   Trouble concentrating on things such as school, work, reading, or watching TV -   Moving or speaking so slowly that other people could have noticed; or the opposite being so fidgety that others notice -   Thoughts of being better off dead, or hurting yourself in some way -   PHQ 9 Score -       Learning Assessment:  No flowsheet data found. Abuse Screening:  No flowsheet data found. Fall Risk  Fall Risk Assessment, last 12 mths 10/27/2021   Able to walk? Yes   Fall in past 12 months? -   Do you feel unsteady? -   Are you worried about falling -   Is TUG test greater than 12 seconds? -   Is the gait abnormal? -   Fall with injury? -       Pt currently taking Anticoagulant therapy?  Yes   Pt currently taking Antiplatelet therapy? No     Coordination of Care:  1. Have you been to the ER, urgent care clinic since your last visit? Hospitalized since your last visit? No     2. Have you seen or consulted any other health care providers outside of the [address] [place] [address] since your last visit? Include any pap smears or colon screening. No       Please see Red banners under Allergies and Med Rec to remove outside inquires. All correct information has been verified with patient and added to chart.      Medication's patient's would liked removed has been marked not taking to be removed per Verbal order and read back per Nixon Coles MD

## 2022-05-08 NOTE — PROGRESS NOTES
Subjective:      Rey Ventura is in the office today for cardiac re-evaluation. He is an 71-year-old man that was hospitalized at [facility] (3/10/2021) for increasing shortness of breath. He was found to be in atrial fibrillation with a rapid ventricular response. The patient had a history of prior embolic CVA. An echocardiogram was done during his hospitalization demonstrating an ejection fraction of 45 to 50%. There was mild MR. There was a dilated left atrium. The patient responded well to diuresis. He spontaneously converted to sinus rhythm shortly after admission. In the office on 6/25/2021, he reported that  \"my stomach is bothering me \". He has had no chest pain. He has had no shortness of breath. He has had no peripheral swelling. He felt \"bloated\". Since his last visit, an upper GI with air-contrast was ordered and completed. Results are as noted below. There was mention of possibly considering CT contrast enhanced meant to further evaluate and impression esophagus at the level of the aortic arch which was felt possibly due to a vascular ring such as an aberrant right subclavian artery. Other findings were of significant GE reflux and esophageal dysmotility. In the office today he reports no chest pain. He says his breathing has been [place] \". He has had no peripheral swelling. He has had no PND or orthopnea.          Patient Active Problem List    Diagnosis Date Noted    Systolic CHF, chronic ([address] 03/11/2021    Acute kidney injury ([address] 03/11/2021    CHF (congestive heart failure) ([address] 03/10/2021    Cerebrovascular disease 03/10/2021    Dyslipidemia 03/10/2021    Atrial fibrillation with rapid ventricular response ([address] 03/10/2021    Hypokalemia 10/25/2018    TIA (transient ischemic attack) 10/22/2018    Cerebrovascular accident (CVA) due to embolism of left middle cerebral artery ([address] 10/22/2018    BPH (benign prostatic hyperplasia) 11/18/2013    Esophageal reflux 11/18/2013    Essential hypertension, benign 11/18/2013    ED (erectile dysfunction) 11/18/2013    CRF (chronic renal failure) 11/18/2013     Current Outpatient Medications   Medication Sig Dispense Refill    Eliquis 2.5 mg tablet TAKE 1 TABLET BY MOUTH 2 TIMES A DAY TO PREVENT BLOOD CLOTS IN CHRONIC ATRIAL FIBRILLATION 60 Tablet 6    ELIQUIS Take 10 mg by mouth daily.  HYDROcodone-acetaminophen (NORCO) 5-325 mg per tablet Take 1 Tablet by mouth every six (6) hours as needed for Pain.  metoprolol tartrate (LOPRESSOR) 100 mg IR tablet Take 100 mg by mouth two (2) times a day.  zolpidem (AMBIEN) 10 mg tablet Take 10 mg by mouth nightly as needed for Sleep.  tri mix compound PGE 20 mcg/ml  Papaverine 30 mg/ml   Phentolamine 2   mg/ml Inject 0.4-0.5 ml prn 10 mL 5    atorvastatin (LIPITOR) 40 mg tablet Take 1 Tab by mouth nightly. (Patient taking differently: Take 40 mg by mouth every Monday, Wednesday, Friday.) 30 Tab 0    potassium chloride SR (K-TAB) 20 mEq tablet Take 2 Tabs by mouth daily. 7 Tab 0    tamsulosin (FLOMAX) 0.4 mg capsule Take 1 Cap by mouth daily. 30 Cap 0    cyclobenzaprine (FLEXERIL) 10 mg tablet 10 mg.      HYDROcodone-acetaminophen (NORCO) 5-325 mg per tablet Take 1 Tab by Mouth Every 6 Hours As Needed for Pain.  MULTIVITAMIN PO Take  one tab by  Mouth Once a Day.  sucralfate (CARAFATE) 1 gram tablet 1 g.      OTHER,NON-FORMULARY, Please dispense a 5 ml solution of Trimix consisting of PGE 20 mcg/Papaverine 30 mg/Phentolamine 2 mg per ml. Patient is to inject 0.4-0.5 ml. 10 Each 11    Syringe with Needle,Disp, Tray (ALLERGIST TRAY 1CC 27GX1/2\") 1 mL 27 x 1/2\" tray Please dispense 25 each/one tray BD brand allergy syringes. To be used as directed. 25 Each 2    cholecalciferol (VITAMIN D3) 1,000 unit tablet Take 2 Tabs by mouth daily. 60 Tab 0    clonazePAM (KLONOPIN) 0.5 mg tablet Take 1 Tab by mouth two (2) times daily as needed.  (Patient taking differently: Take 0.5 mg by mouth two (2) times daily as needed for Anxiety or Sleep. Anxiety, sleep) 60 Tab 0    doxepin (SINEQUAN) 25 mg capsule Take 1 Cap by mouth nightly. 60 Cap 0    metoprolol (LOPRESSOR) 100 mg tablet Take 1 Tab by mouth two (2) times a day. 60 Tab 0    terazosin (HYTRIN) 10 mg capsule Take 1 Cap by mouth nightly. 60 Cap 0    oxyCODONE IR (ROXICODONE) 5 mg immediate release tablet Take 1 Tab by mouth every four (4) hours as needed. 100 Tab 0    zolpidem (AMBIEN) 5 mg tablet Take 1 Tab by mouth nightly as needed for Sleep. 30 Tab 0    omeprazole (PRILOSEC) 40 mg capsule Take 40 mg by mouth daily.        No Known Allergies  Past Medical History:   Diagnosis Date    Alcohol dependence, episodic drinking behavior ([facility] [address]     Other and Unspecified    Anxiety state     Arthritis     BPH with obstruction/lower urinary tract symptoms     Carpal tunnel syndrome     Chronic kidney disease, stage III (moderate) (HCC)     Dyslipidemia     Elevated PSA 2011    Esophageal reflux     Essential hypertension, benign     Exercise tolerance finding July 2015    GERD (gastroesophageal reflux disease)     Gouty arthropathy     History of blood transfusion     Hypopotassemia     Hyposmolality and/or hyponatremia     Irritable bowel syndrome     Knee pain, left     Lumbago     Lumbar back pain     S/P TKR (total knee replacement) 2013    Scoliosis deformity of spine     Sleep apnea      Past Surgical History:   Procedure Laterality Date    HX CHOLECYSTECTOMY      HX HERNIA REPAIR      HX OTHER SURGICAL  07.31.2015    HX ADJACENT TISSUE TRANSFER/REARRANGEMENT     Family History   Problem Relation Age of Onset    Hypertension Father     Diabetes Mother      Social History     Tobacco Use   Smoking Status Former Smoker   Smokeless Tobacco Never Used          Review of Systems, additional:  Constitutional: negative  Eyes: negative  Respiratory: negative  Cardiovascular: negative  Gastrointestinal: positive for nausea  Musculoskeletal:negative  Neurological: negative  Behvioral/Psych: negative  Endocrine: negative  ENT: negative    Objective:     Visit Vitals  /70   Pulse (!) 58   Temp 98.1 °F (36.7 °C) (Temporal)   Wt 81 kg (178 lb 9.6 oz)   SpO2 98%   BMI 27.97 kg/m²     General:  alert, cooperative, no distress, appears stated age   Chest Wall: inspection normal - no chest wall deformities or tenderness, respiratory effort normal   Lung: clear to auscultation bilaterally   Heart:  normal rate and regular rhythm, S1 and S2 normal, no murmurs noted, no gallops noted, no JVD   Abdomen: soft, non-tender. Bowel sounds normal. No masses,  no organomegaly   Extremities: extremities normal, atraumatic, no cyanosis or edema Skin: no rashes   Neuro: alert, oriented, normal speech, no focal findings or movement disorder noted     EKG: 3/11/2021. Sinus rhythm. Diffuse nondiagnostic ST and T wave abnormalities. Assessment/Plan:         ICD-10-CM ICD-9-CM    1. Systolic CHF, chronic ([facility] [address] echocardiogram done 3/10/2021 demonstrated ejection fraction 45 to 50%. There was a dilated left atrium. There was moderate MR. Patient continues to have stable symptoms. Return in 6 months. I50.22 428.22      428.0    2. Dyslipidemia  E78.5 272.4    3. Essential hypertension, benign , BP is 124/70 in the office today. I10 401.1    4. Cerebrovascular accident (CVA) due to embolism of left middle cerebral artery (HCC) , history of, [id number] score of 6 - 7. Continue Eliquis 2.5 mg twice daily I63.412 434.11    5. Atrial fibrillation with rapid ventricular response (HCC) , regular rhythm in the office today. I48.91 427.31    6. GERD, completed upper GI with air-contrast on 7/21/2021. There was esophageal dysmotility and significant GE reflux. Note was made of the  impression on the esophagus at the level of the aortic arch possibly due to vascular ring such as apparent right subclavian artery.

## 2022-11-09 ENCOUNTER — OFFICE VISIT (OUTPATIENT)
Dept: CARDIOLOGY CLINIC | Age: 87
End: 2022-11-09
Payer: MEDICARE

## 2022-11-09 VITALS
BODY MASS INDEX: 26.47 KG/M2 | OXYGEN SATURATION: 98 % | DIASTOLIC BLOOD PRESSURE: 62 MMHG | TEMPERATURE: 97 F | SYSTOLIC BLOOD PRESSURE: 138 MMHG | HEART RATE: 49 BPM | WEIGHT: 169 LBS

## 2022-11-09 DIAGNOSIS — I48.91 ATRIAL FIBRILLATION WITH RAPID VENTRICULAR RESPONSE (HCC): Primary | ICD-10-CM

## 2022-11-09 PROCEDURE — 1123F ACP DISCUSS/DSCN MKR DOCD: CPT | Performed by: INTERNAL MEDICINE

## 2022-11-09 PROCEDURE — G8417 CALC BMI ABV UP PARAM F/U: HCPCS | Performed by: INTERNAL MEDICINE

## 2022-11-09 PROCEDURE — 1101F PT FALLS ASSESS-DOCD LE1/YR: CPT | Performed by: INTERNAL MEDICINE

## 2022-11-09 PROCEDURE — 93000 ELECTROCARDIOGRAM COMPLETE: CPT | Performed by: INTERNAL MEDICINE

## 2022-11-09 PROCEDURE — G8536 NO DOC ELDER MAL SCRN: HCPCS | Performed by: INTERNAL MEDICINE

## 2022-11-09 PROCEDURE — G8510 SCR DEP NEG, NO PLAN REQD: HCPCS | Performed by: INTERNAL MEDICINE

## 2022-11-09 PROCEDURE — G8427 DOCREV CUR MEDS BY ELIG CLIN: HCPCS | Performed by: INTERNAL MEDICINE

## 2022-11-09 PROCEDURE — 99214 OFFICE O/P EST MOD 30 MIN: CPT | Performed by: INTERNAL MEDICINE

## 2022-11-09 NOTE — PROGRESS NOTES
Identified pt with two pt identifiers(name and ). Reviewed record in preparation for visit and have obtained necessary documentation. Ursula Vargas presents today for   Chief Complaint   Patient presents with    Follow-up     6 month        Pt denies  DIZZINESS, SOB, CHEST PAIN/ PRESSURE, FATIGUE/WEAKNESS, HEADACHES, SWELLING. Ursula Vargas preferred language for health care discussion is english/other. Personal Protective Equipment:   Personal Protective Equipment was used including: mask-surgical and hands-gloves. Patient was placed on no precaution(s). Patient was masked. Precautions:   Patient currently on None  Patient currently roomed with door closed. Is someone accompanying this pt? No     Is the patient using any DME equipment during OV? Yes. Cane     Depression Screening:  3 most recent PHQ Screens 2022   Little interest or pleasure in doing things Not at all   Feeling down, depressed, irritable, or hopeless Not at all   Total Score PHQ 2 0   Trouble falling or staying asleep, or sleeping too much -   Feeling tired or having little energy -   Poor appetite, weight loss, or overeating -   Feeling bad about yourself - or that you are a failure or have let yourself or your family down -   Trouble concentrating on things such as school, work, reading, or watching TV -   Moving or speaking so slowly that other people could have noticed; or the opposite being so fidgety that others notice -   Thoughts of being better off dead, or hurting yourself in some way -   PHQ 9 Score -       Learning Assessment:  No flowsheet data found. Abuse Screening:  No flowsheet data found. Fall Risk  Fall Risk Assessment, last 12 mths 2022   Able to walk? Yes   Fall in past 12 months? 1   Do you feel unsteady? 1   Are you worried about falling 1   Is TUG test greater than 12 seconds? -   Is the gait abnormal? 0   Number of falls in past 12 months 1   Fall with injury?  1       Pt currently taking Anticoagulant therapy? Yes   Pt currently taking Antiplatelet therapy? No     Coordination of Care:  1. Have you been to the ER, urgent care clinic since your last visit? Hospitalized since your last visit? No     2. Have you seen or consulted any other health care providers outside of the [address] [place] [address] since your last visit? Include any pap smears or colon screening. Yes       Please see Red banners under Allergies and Med Rec to remove outside inquires. All correct information has been verified with patient and added to chart.      Medication's patient's would liked removed has been marked not taking to be removed per Verbal order and read back per Jeri Zhou MD

## 2022-11-21 NOTE — PROGRESS NOTES
Subjective:      Eliecer Serrano is in the office today for cardiac re-evaluation. He is an 80-year-old man that was hospitalized at [facility] (3/10/2021) for increasing shortness of breath. He was found to be in atrial fibrillation with a rapid ventricular response. The patient had a history of prior embolic CVA. An echocardiogram was done during his hospitalization demonstrating an ejection fraction of 45 to 50%. There was mild MR. There was a dilated left atrium. The patient responded well to diuresis. He spontaneously converted to sinus rhythm shortly after admission. In the office on 6/25/2021, he reported that  \"my stomach is bothering me \". He has had no chest pain. He has had no shortness of breath. He has had no peripheral swelling. He felt \"bloated\". Since his last visit, an upper GI with air-contrast was ordered and completed. Results are as noted below. There was mention of possibly considering CT contrast enhanced meant to further evaluate and impression esophagus at the level of the aortic arch which was felt possibly due to a vascular ring such as an aberrant right subclavian artery. Other findings were of significant GE reflux and esophageal dysmotility. In the office today he reports that he is feeling okay. He has \"good days and bad days \". He has had no chest pain. He has had no shortness of breath. His pulse rate is on the low side. He is on metoprolol tartrate 100 mg twice daily.          Patient Active Problem List    Diagnosis Date Noted    Systolic CHF, chronic ([address] 03/11/2021    Acute kidney injury ([address] 03/11/2021    CHF (congestive heart failure) ([address] 03/10/2021    Cerebrovascular disease 03/10/2021    Dyslipidemia 03/10/2021    Atrial fibrillation with rapid ventricular response ([address] 03/10/2021    Hypokalemia 10/25/2018    TIA (transient ischemic attack) 10/22/2018    Cerebrovascular accident (CVA) due to embolism of left middle cerebral artery ([address] 10/22/2018    BPH (benign prostatic hyperplasia) 11/18/2013    Esophageal reflux 11/18/2013    Essential hypertension, benign 11/18/2013    ED (erectile dysfunction) 11/18/2013    CRF (chronic renal failure) 11/18/2013     Current Outpatient Medications   Medication Sig Dispense Refill    Eliquis 2.5 mg tablet TAKE 1 TABLET BY MOUTH 2 TIMES A DAY TO PREVENT BLOOD CLOTS IN CHRONIC ATRIAL FIBRILLATION 60 Tablet 6    HYDROcodone-acetaminophen (NORCO) 5-325 mg per tablet Take 1 Tablet by mouth every six (6) hours as needed for Pain.      metoprolol tartrate (LOPRESSOR) 100 mg IR tablet Take 100 mg by mouth two (2) times a day. zolpidem (AMBIEN) 10 mg tablet Take 10 mg by mouth nightly as needed for Sleep.      tri mix compound PGE 20 mcg/ml  Papaverine 30 mg/ml   Phentolamine 2   mg/ml Inject 0.4-0.5 ml prn 10 mL 5    atorvastatin (LIPITOR) 40 mg tablet Take 1 Tab by mouth nightly. (Patient taking differently: Take 40 mg by mouth every Monday, Wednesday, Friday.) 30 Tab 0    MULTIVITAMIN PO Take  one tab by  Mouth Once a Day. OTHER,NON-FORMULARY, Please dispense a 5 ml solution of Trimix consisting of PGE 20 mcg/Papaverine 30 mg/Phentolamine 2 mg per ml. Patient is to inject 0.4-0.5 ml. 10 Each 11    Syringe with Needle,Disp, Tray (ALLERGIST TRAY 1CC 27GX1/2\") 1 mL 27 x 1/2\" tray Please dispense 25 each/one tray BD brand allergy syringes. To be used as directed. 25 Each 2    cholecalciferol (VITAMIN D3) 1,000 unit tablet Take 2 Tabs by mouth daily. 60 Tab 0    metoprolol (LOPRESSOR) 100 mg tablet Take 1 Tab by mouth two (2) times a day. 60 Tab 0    terazosin (HYTRIN) 10 mg capsule Take 1 Cap by mouth nightly. 60 Cap 0    omeprazole (PRILOSEC) 40 mg capsule Take 40 mg by mouth daily.        No Known Allergies  Past Medical History:   Diagnosis Date    Alcohol dependence, episodic drinking behavior ([facility] [address]     Other and Unspecified    Anxiety state     Arthritis     BPH with obstruction/lower urinary tract symptoms     Carpal tunnel syndrome     Chronic kidney disease, stage III (moderate) (HCC)     Dyslipidemia     Elevated PSA 2011    Esophageal reflux     Essential hypertension, benign     Exercise tolerance finding July 2015    GERD (gastroesophageal reflux disease)     Gouty arthropathy     History of blood transfusion     Hypopotassemia     Hyposmolality and/or hyponatremia     Irritable bowel syndrome     Knee pain, left     Lumbago     Lumbar back pain     S/P TKR (total knee replacement) 2013    Scoliosis deformity of spine     Sleep apnea      Past Surgical History:   Procedure Laterality Date    HX CHOLECYSTECTOMY      HX HERNIA REPAIR      HX OTHER SURGICAL  07.31.2015    HX ADJACENT TISSUE TRANSFER/REARRANGEMENT     Family History   Problem Relation Age of Onset    Hypertension Father     Diabetes Mother      Social History     Tobacco Use   Smoking Status Former   Smokeless Tobacco Never          Review of Systems, additional:  Constitutional: negative  Eyes: negative  Respiratory: negative  Cardiovascular: negative  Gastrointestinal: positive for nausea  Musculoskeletal:negative  Neurological: negative  Behvioral/Psych: negative  Endocrine: negative  ENT: negative    Objective:     Visit Vitals  /62   Pulse (!) 49   Temp 97 °F (36.1 °C) (Temporal)   Wt 76.7 kg (169 lb)   SpO2 98%   BMI 26.47 kg/m²     General:  alert, cooperative, no distress, appears stated age   Chest Wall: inspection normal - no chest wall deformities or tenderness, respiratory effort normal   Lung: clear to auscultation bilaterally   Heart:  normal rate and regular rhythm, S1 and S2 normal, no murmurs noted, no gallops noted, no JVD   Abdomen: soft, non-tender. Bowel sounds normal. No masses,  no organomegaly   Extremities: extremities normal, atraumatic, no cyanosis or edema Skin: no rashes   Neuro: alert, oriented, normal speech, no focal findings or movement disorder noted     EKG: 3/11/2021. Sinus bradycardia with first-degree AV block.   Bundle branch block. Left axis deviation. Assessment/Plan:         ICD-10-CM ICD-9-CM    1. Systolic CHF, chronic ([address] echocardiogram done 3/10/2021 demonstrated ejection fraction 45 to 50%. There was a dilated left atrium. There was moderate MR. Patient continues to have stable symptoms. Return in 6 months. I50.22 428.22      428.0    2. Dyslipidemia  E78.5 272.4    3. Essential hypertension, benign , BP is 138/62 in the office today. I10 401.1    4. Cerebrovascular accident (CVA) due to embolism of left middle cerebral artery (HCC) , history of, [id number] score of 6 - 7. Continue Eliquis 2.5 mg twice daily I63.412 434.11    5. Atrial fibrillation with rapid ventricular response (HCC) , regular rhythm in the office today. Patient on metoprolol tartrate 100 mg twice daily. I48.91 427.31    6. GERD, completed upper GI with air-contrast on 7/21/2021. There was esophageal dysmotility and significant GE reflux. Note was made of the  impression on the esophagus at the level of the aortic arch possibly due to vascular ring such as apparent right subclavian artery.